# Patient Record
Sex: FEMALE | Race: WHITE | NOT HISPANIC OR LATINO | Employment: OTHER | URBAN - METROPOLITAN AREA
[De-identification: names, ages, dates, MRNs, and addresses within clinical notes are randomized per-mention and may not be internally consistent; named-entity substitution may affect disease eponyms.]

---

## 2017-01-03 ENCOUNTER — TELEPHONE (OUTPATIENT)
Dept: FAMILY MEDICINE CLINIC | Facility: CLINIC | Age: 57
End: 2017-01-03

## 2017-01-13 ENCOUNTER — TELEPHONE (OUTPATIENT)
Dept: FAMILY MEDICINE CLINIC | Facility: CLINIC | Age: 57
End: 2017-01-13

## 2017-01-13 NOTE — TELEPHONE ENCOUNTER
----- Message from Krysta Gutierrez sent at 1/12/2017  4:25 PM EST -----  Regarding: BLOODWORK/ MEDS  Contact: [phone]  LDS: 12/14/16  NEXT APPT: 6/16/17    [facility]    SHE HAD BLOODWORK ON 12/14/16 TO MAKE SURE SHE CAN GET CELEBREX 2X DAILY. DR HORNE HAS CALLED HER BUT HE DOESN'T HAVE THE LAB REPORT.

## 2017-02-06 RX ORDER — ORPHENADRINE CITRATE 100 MG/1
TABLET, EXTENDED RELEASE ORAL
Qty: 60 TABLET | Refills: 1 | Status: SHIPPED | OUTPATIENT
Start: 2017-02-06 | End: 2017-06-16

## 2017-02-06 RX ORDER — MOMETASONE FUROATE AND FORMOTEROL FUMARATE DIHYDRATE 200; 5 UG/1; UG/1
AEROSOL RESPIRATORY (INHALATION)
Qty: 13 G | Refills: 3 | Status: SHIPPED | OUTPATIENT
Start: 2017-02-06 | End: 2017-10-19 | Stop reason: SDUPTHER

## 2017-03-04 RX ORDER — LEVOFLOXACIN 500 MG/1
500 TABLET, FILM COATED ORAL DAILY
Qty: 10 TABLET | Refills: 0 | Status: SHIPPED | OUTPATIENT
Start: 2017-03-04 | End: 2017-03-07 | Stop reason: SDUPTHER

## 2017-03-07 RX ORDER — LEVOFLOXACIN 500 MG/1
500 TABLET, FILM COATED ORAL DAILY
Qty: 10 TABLET | Refills: 0 | Status: SHIPPED | OUTPATIENT
Start: 2017-03-07 | End: 2017-03-17

## 2017-03-18 ENCOUNTER — OFFICE VISIT (OUTPATIENT)
Dept: FAMILY MEDICINE CLINIC | Facility: CLINIC | Age: 57
End: 2017-03-18

## 2017-03-18 VITALS
DIASTOLIC BLOOD PRESSURE: 96 MMHG | SYSTOLIC BLOOD PRESSURE: 146 MMHG | BODY MASS INDEX: 35.51 KG/M2 | HEIGHT: 64 IN | WEIGHT: 208 LBS | TEMPERATURE: 97.9 F

## 2017-03-18 DIAGNOSIS — M54.42 ACUTE BILATERAL LOW BACK PAIN WITH LEFT-SIDED SCIATICA: ICD-10-CM

## 2017-03-18 DIAGNOSIS — G93.31 POST-INFLUENZA SYNDROME: Primary | ICD-10-CM

## 2017-03-18 DIAGNOSIS — G44.209 ACUTE NON INTRACTABLE TENSION-TYPE HEADACHE: ICD-10-CM

## 2017-03-18 DIAGNOSIS — J32.4 PANSINUSITIS, UNSPECIFIED CHRONICITY: ICD-10-CM

## 2017-03-18 DIAGNOSIS — I15.9 SECONDARY HYPERTENSION: ICD-10-CM

## 2017-03-18 DIAGNOSIS — J45.901 REACTIVE AIRWAY DISEASE WITH ACUTE EXACERBATION: ICD-10-CM

## 2017-03-18 DIAGNOSIS — J40 BRONCHITIS: ICD-10-CM

## 2017-03-18 PROBLEM — M54.50 ACUTE LOW BACK PAIN: Status: ACTIVE | Noted: 2017-03-18

## 2017-03-18 PROCEDURE — 96372 THER/PROPH/DIAG INJ SC/IM: CPT | Performed by: INTERNAL MEDICINE

## 2017-03-18 PROCEDURE — 99214 OFFICE O/P EST MOD 30 MIN: CPT | Performed by: INTERNAL MEDICINE

## 2017-03-18 RX ORDER — BENZONATATE 100 MG/1
100 CAPSULE ORAL 3 TIMES DAILY PRN
Qty: 40 CAPSULE | Refills: 1 | Status: SHIPPED | OUTPATIENT
Start: 2017-03-18 | End: 2017-06-16

## 2017-03-18 RX ORDER — ORPHENADRINE CITRATE 100 MG/1
100 TABLET, EXTENDED RELEASE ORAL
COMMUNITY
End: 2017-03-18

## 2017-03-18 RX ORDER — METHYLPREDNISOLONE ACETATE 80 MG/ML
80 INJECTION, SUSPENSION INTRA-ARTICULAR; INTRALESIONAL; INTRAMUSCULAR; SOFT TISSUE ONCE
Status: COMPLETED | OUTPATIENT
Start: 2017-03-18 | End: 2017-03-18

## 2017-03-18 RX ORDER — TRAMADOL HYDROCHLORIDE 50 MG/1
50 TABLET ORAL EVERY 6 HOURS PRN
Qty: 60 TABLET | Refills: 0 | Status: SHIPPED | OUTPATIENT
Start: 2017-03-18 | End: 2017-06-16

## 2017-03-18 RX ORDER — CLARITHROMYCIN 500 MG/1
500 TABLET, COATED ORAL 2 TIMES DAILY
Qty: 28 TABLET | Refills: 0 | Status: SHIPPED | OUTPATIENT
Start: 2017-03-18 | End: 2017-04-01

## 2017-03-18 RX ORDER — FLUTICASONE PROPIONATE 50 MCG
1 SPRAY, SUSPENSION (ML) NASAL
COMMUNITY
End: 2017-06-16

## 2017-03-18 RX ORDER — DEXTROMETHORPHAN HYDROBROMIDE AND PROMETHAZINE HYDROCHLORIDE 15; 6.25 MG/5ML; MG/5ML
5 SYRUP ORAL 4 TIMES DAILY PRN
Qty: 240 ML | Refills: 0 | Status: SHIPPED | OUTPATIENT
Start: 2017-03-18 | End: 2017-06-16

## 2017-03-18 RX ORDER — BENZONATATE 100 MG/1
CAPSULE ORAL
Refills: 0 | COMMUNITY
Start: 2017-01-17 | End: 2017-03-18

## 2017-03-18 RX ORDER — MONTELUKAST SODIUM 10 MG/1
10 TABLET ORAL
COMMUNITY
End: 2017-03-18

## 2017-03-18 RX ADMIN — METHYLPREDNISOLONE ACETATE 80 MG: 80 INJECTION, SUSPENSION INTRA-ARTICULAR; INTRALESIONAL; INTRAMUSCULAR; SOFT TISSUE at 13:20

## 2017-03-18 NOTE — PATIENT INSTRUCTIONS
Diagnoses and all orders for this visit:    Post-influenza syndrome  Comments:  Flu swab positive  Flare of underlying RAD  Orders:  -     methylPREDNISolone acetate (DEPO-medrol) injection 80 mg; Inject 1 mL into the shoulder, thigh, or buttocks 1 (One) Time.    Reactive airway disease with acute exacerbation  Comments:  Use all inhalers regularly  DepoMedrol shot 60 mg    Orders:  -     methylPREDNISolone acetate (DEPO-medrol) injection 80 mg; Inject 1 mL into the shoulder, thigh, or buttocks 1 (One) Time.    Pansinusitis, unspecified chronicity  Comments:  Biaxin 500 bid x 14 days  Now stable at present  Orders:  -     methylPREDNISolone acetate (DEPO-medrol) injection 80 mg; Inject 1 mL into the shoulder, thigh, or buttocks 1 (One) Time.    Bronchitis  Comments:  Biaxin 500 bid  Tessalon Pearles and Promethazine DM  Orders:  -     methylPREDNISolone acetate (DEPO-medrol) injection 80 mg; Inject 1 mL into the shoulder, thigh, or buttocks 1 (One) Time.    Acute non intractable tension-type headache  Comments:  Stable at present  Orders:  -     methylPREDNISolone acetate (DEPO-medrol) injection 80 mg; Inject 1 mL into the shoulder, thigh, or buttocks 1 (One) Time.    Secondary hypertension  Comments:  Avoid salt  Try to lose weight  Follow up if persistent  Orders:  -     methylPREDNISolone acetate (DEPO-medrol) injection 80 mg; Inject 1 mL into the shoulder, thigh, or buttocks 1 (One) Time.    Acute bilateral low back pain with left-sided sciatica  Comments:  MARCIA checked  Ultram RX bshort term  Follow up with Dr. Drake    Other orders  -     fluticasone (FLONASE) 50 MCG/ACT nasal spray; 1 spray into each nostril.  -     Discontinue: mometasone-formoterol (DULERA) 200-5 MCG/ACT inhaler; Inhale.  -     Discontinue: montelukast (SINGULAIR) 10 MG tablet; Take 10 mg by mouth.  -     Discontinue: orphenadrine (NORFLEX) 100 MG 12 hr tablet; Take 100 mg by mouth.  -     Discontinue: benzonatate (TESSALON) 100 MG  capsule; TAKE 1 TO 2 CAPSULES BY MOUTH 3 TIMES A DAY AS NEEDED  -     promethazine-dextromethorphan (PROMETHAZINE-DM) 6.25-15 MG/5ML syrup; Take 5 mL by mouth 4 (Four) Times a Day As Needed for Cough.  -     clarithromycin (BIAXIN) 500 MG tablet; Take 1 tablet by mouth 2 (Two) Times a Day for 14 days.  -     benzonatate (TESSALON PERLES) 100 MG capsule; Take 1 capsule by mouth 3 (Three) Times a Day As Needed for Cough.  -     traMADol (ULTRAM) 50 MG tablet; Take 1 tablet by mouth Every 6 (Six) Hours As Needed for Moderate Pain (4-6).

## 2017-03-18 NOTE — PROGRESS NOTES
Flaquita Cr is a 56 y.o. female   Chief Complaint   Patient presents with   • URI     pt c/o not better after taking Levaquin, c/o sinus pressure, headaches, chest congestion, nasal drainage, persistent cough,  thick yellow phlegm x 2wks     Per House Bill #1 Requirements and [place] Board of Medical Licensure Regulations for prescribing of Schedule II and Schedule III with Hydrocodone, and other controlled medications for which the Board requires MARCIA reporting and regulation, the following drug treatment plan was developed and reviewed with the patient on the date of this encounter:    Controlled medication(s) taken: Ultram      Medical Indication (including pain relief and/or other physical and psychoosocial functional issue) for treatment: Back pain    Further Diagnostic tests, consultations, or treatments needed: follow up with Dr. Drake next week    Plans for review of plan, adjustment and waning dose and further MARCIA evaluation include: MARCIA    Risk for medication abuse for this patient based on physician review is felt to be extremely low.    Updated: 03/18/17 by Dr. William Monteiro            Subjective   History of Present Illness     Flaquita Cr is a 56 y.o.female who presents with:follow after flu 6 weeks ago.  Treated with Tessalon Pearles.  Too late to use Tamiflu.  Nose swab positive for flu but negative for strep.  Had Levaquin 1 week ago by [facility] and no improved.  Has cough, sneezes, fever and re-exposure to pneumonia.  She is coughing up mucous at times that is deep yellow in color.  Suspect that she has pneumonia in 2 spows.   also has been on doxycycline in past.  She had reaction in the past.    Now feels bad at present time.Weight up 10#.  No energy to do anything at present.  Hard to walk around at a store.  Lungs SOA      The following portions of the patient's history were reviewed and updated as appropriate: past medical history, surgeries, family history,  "allergies, current medications, past social history and problem list.    A comprehensive review of 14 systems was peformed  Review of Systems   Constitutional: Negative for chills, fatigue, fever and unexpected weight change.   HENT: Positive for congestion, postnasal drip and sinus pressure. Negative for ear pain, hearing loss, sore throat and tinnitus.    Eyes: Negative for pain, discharge and redness.   Respiratory: Positive for cough. Negative for shortness of breath and wheezing.    Cardiovascular: Negative for chest pain, palpitations and leg swelling.   Gastrointestinal: Negative for abdominal pain, constipation, diarrhea and nausea.   Endocrine: Negative for cold intolerance and heat intolerance.   Genitourinary: Negative for difficulty urinating, flank pain and urgency.   Musculoskeletal: Negative for back pain, joint swelling and myalgias.   Skin: Negative for rash and wound.   Allergic/Immunologic: Negative for environmental allergies and food allergies.   Neurological: Positive for headaches. Negative for dizziness, seizures and numbness.   Hematological: Negative for adenopathy. Does not bruise/bleed easily.   Psychiatric/Behavioral: Negative for decreased concentration, dysphoric mood and sleep disturbance. The patient is not nervous/anxious.    All other systems reviewed and are negative.      I have reviewed the patient's medical history in detail and updated the computerized patient record.      Objective   Vitals:    03/18/17 1028   BP: 146/96   BP Location: Left arm   Patient Position: Sitting   Cuff Size: Adult   Temp: 97.9 °F (36.6 °C)   Weight: 208 lb (94.3 kg)   Height: 64\" (162.6 cm)         Physical Exam   Constitutional: She appears well-developed and well-nourished.   HENT:   Head: Normocephalic and atraumatic.   Right Ear: External ear normal.   Left Ear: External ear normal.   Nose: Nose normal.   Mouth/Throat: Oropharynx is clear and moist.   Sinus congestion and pressure pansinusitis "   Eyes: Conjunctivae and EOM are normal. Pupils are equal, round, and reactive to light.   Neck: Normal range of motion. Neck supple.   Cardiovascular: Normal rate, regular rhythm, normal heart sounds and intact distal pulses.    Pulmonary/Chest: Effort normal and breath sounds normal.   Wheezing mildly despite Singulair, Proair, and Dulera   Abdominal: Soft. Bowel sounds are normal.   Musculoskeletal: Normal range of motion.   Neurological: She is alert. She has normal reflexes.   Skin: Skin is warm and dry.   Psychiatric: She has a normal mood and affect. Her behavior is normal. Judgment and thought content normal.       Procedures                        Assessment/Plan     Diagnoses and all orders for this visit:    Post-influenza syndrome  Comments:  Flu swab positive  Flare of underlying RAD  Orders:  -     methylPREDNISolone acetate (DEPO-medrol) injection 80 mg; Inject 1 mL into the shoulder, thigh, or buttocks 1 (One) Time.    Reactive airway disease with acute exacerbation  Comments:  Use all inhalers regularly  DepoMedrol shot 60 mg    Orders:  -     methylPREDNISolone acetate (DEPO-medrol) injection 80 mg; Inject 1 mL into the shoulder, thigh, or buttocks 1 (One) Time.    Pansinusitis, unspecified chronicity  Comments:  Biaxin 500 bid x 14 days  Now stable at present  Orders:  -     methylPREDNISolone acetate (DEPO-medrol) injection 80 mg; Inject 1 mL into the shoulder, thigh, or buttocks 1 (One) Time.    Bronchitis  Comments:  Biaxin 500 bid  Tessalon Pearles and Promethazine DM  Orders:  -     methylPREDNISolone acetate (DEPO-medrol) injection 80 mg; Inject 1 mL into the shoulder, thigh, or buttocks 1 (One) Time.    Acute non intractable tension-type headache  Comments:  Stable at present  Orders:  -     methylPREDNISolone acetate (DEPO-medrol) injection 80 mg; Inject 1 mL into the shoulder, thigh, or buttocks 1 (One) Time.    Secondary hypertension  Comments:  Avoid salt  Try to lose weight  Follow up  if persistent  Orders:  -     methylPREDNISolone acetate (DEPO-medrol) injection 80 mg; Inject 1 mL into the shoulder, thigh, or buttocks 1 (One) Time.    Acute bilateral low back pain with left-sided sciatica  Comments:  MARCIA checked  Ultram RX bshort term  Follow up with Dr. Drake    Other orders  -     fluticasone (FLONASE) 50 MCG/ACT nasal spray; 1 spray into each nostril.  -     Discontinue: mometasone-formoterol (DULERA) 200-5 MCG/ACT inhaler; Inhale.  -     Discontinue: montelukast (SINGULAIR) 10 MG tablet; Take 10 mg by mouth.  -     Discontinue: orphenadrine (NORFLEX) 100 MG 12 hr tablet; Take 100 mg by mouth.  -     Discontinue: benzonatate (TESSALON) 100 MG capsule; TAKE 1 TO 2 CAPSULES BY MOUTH 3 TIMES A DAY AS NEEDED  -     promethazine-dextromethorphan (PROMETHAZINE-DM) 6.25-15 MG/5ML syrup; Take 5 mL by mouth 4 (Four) Times a Day As Needed for Cough.  -     clarithromycin (BIAXIN) 500 MG tablet; Take 1 tablet by mouth 2 (Two) Times a Day for 14 days.  -     benzonatate (TESSALON PERLES) 100 MG capsule; Take 1 capsule by mouth 3 (Three) Times a Day As Needed for Cough.  -     traMADol (ULTRAM) 50 MG tablet; Take 1 tablet by mouth Every 6 (Six) Hours As Needed for Moderate Pain (4-6).           William Monteiro MD  3/18/2017  10:34 AM

## 2017-03-20 RX ORDER — GUAIFENESIN/DEXTROMETHORPHAN 100-10MG/5
SYRUP ORAL
Qty: 240 ML | Refills: 0 | Status: SHIPPED | OUTPATIENT
Start: 2017-03-20 | End: 2017-06-16

## 2017-05-03 ENCOUNTER — TELEPHONE (OUTPATIENT)
Dept: FAMILY MEDICINE CLINIC | Facility: CLINIC | Age: 57
End: 2017-05-03

## 2017-06-16 ENCOUNTER — RESULTS ENCOUNTER (OUTPATIENT)
Dept: FAMILY MEDICINE CLINIC | Facility: CLINIC | Age: 57
End: 2017-06-16

## 2017-06-16 ENCOUNTER — OFFICE VISIT (OUTPATIENT)
Dept: FAMILY MEDICINE CLINIC | Facility: CLINIC | Age: 57
End: 2017-06-16

## 2017-06-16 VITALS
HEART RATE: 92 BPM | OXYGEN SATURATION: 94 % | BODY MASS INDEX: 35.1 KG/M2 | TEMPERATURE: 98 F | DIASTOLIC BLOOD PRESSURE: 70 MMHG | SYSTOLIC BLOOD PRESSURE: 106 MMHG | HEIGHT: 64 IN | WEIGHT: 205.6 LBS

## 2017-06-16 DIAGNOSIS — N32.81 OVERACTIVE BLADDER: ICD-10-CM

## 2017-06-16 DIAGNOSIS — R53.82 CHRONIC FATIGUE: ICD-10-CM

## 2017-06-16 DIAGNOSIS — Z13.9 SCREENING: ICD-10-CM

## 2017-06-16 DIAGNOSIS — N30.00 ACUTE CYSTITIS WITHOUT HEMATURIA: ICD-10-CM

## 2017-06-16 DIAGNOSIS — L71.9 ACNE ROSACEA: ICD-10-CM

## 2017-06-16 DIAGNOSIS — R10.32 LEFT LOWER QUADRANT PAIN: ICD-10-CM

## 2017-06-16 DIAGNOSIS — Z13.9 SCREENING: Primary | ICD-10-CM

## 2017-06-16 LAB
BILIRUB BLD-MCNC: NEGATIVE MG/DL
CLARITY, POC: CLEAR
COLOR UR: YELLOW
GLUCOSE UR STRIP-MCNC: NEGATIVE MG/DL
KETONES UR QL: NEGATIVE
LEUKOCYTE EST, POC: NEGATIVE
NITRITE UR-MCNC: NEGATIVE MG/ML
PH UR: 6 [PH] (ref 5–8)
PROT UR STRIP-MCNC: NEGATIVE MG/DL
RBC # UR STRIP: NEGATIVE /UL
SP GR UR: 1.03 (ref 1–1.03)
UROBILINOGEN UR QL: NORMAL

## 2017-06-16 PROCEDURE — 81003 URINALYSIS AUTO W/O SCOPE: CPT | Performed by: FAMILY MEDICINE

## 2017-06-16 PROCEDURE — 99213 OFFICE O/P EST LOW 20 MIN: CPT | Performed by: FAMILY MEDICINE

## 2017-06-16 RX ORDER — CELECOXIB 200 MG/1
200 CAPSULE ORAL 2 TIMES DAILY
Qty: 60 CAPSULE | Refills: 6 | Status: SHIPPED | OUTPATIENT
Start: 2017-06-16 | End: 2018-04-11 | Stop reason: SDUPTHER

## 2017-06-16 RX ORDER — DOXYCYCLINE HYCLATE 100 MG/1
100 TABLET, DELAYED RELEASE ORAL DAILY
Qty: 30 TABLET | Refills: 6 | Status: SHIPPED | OUTPATIENT
Start: 2017-06-16 | End: 2017-07-27

## 2017-06-16 NOTE — PROGRESS NOTES
Chief Complaint and HPI    I have reviewed the patient's medical history in detail and updated the computerized patient record.    Subjective: Flaquita Cr is a 57 y.o. female presents   here today for  .    Hypertension (follow up feels like may have a UTI ); Osteoarthritis (on celebrex); Fatigue; and Urinary Tract Infection (urgency)      Review of Systems   Constitutional: Negative for chills, fatigue, fever and unexpected weight change.   HENT: Negative for ear pain, hearing loss, sinus pressure, sore throat and tinnitus.    Eyes: Negative for pain, discharge and redness.   Respiratory: Negative for cough, shortness of breath and wheezing.    Cardiovascular: Negative for chest pain, palpitations and leg swelling.   Gastrointestinal: Negative for abdominal pain, constipation, diarrhea and nausea.   Endocrine: Negative for cold intolerance and heat intolerance.   Genitourinary: Negative for difficulty urinating, flank pain and urgency.   Musculoskeletal: Negative for back pain, joint swelling and myalgias.   Skin: Negative for rash and wound.   Allergic/Immunologic: Negative for environmental allergies and food allergies.   Neurological: Negative for dizziness, seizures, numbness and headaches.   Hematological: Negative for adenopathy. Does not bruise/bleed easily.   Psychiatric/Behavioral: Negative for decreased concentration, dysphoric mood and sleep disturbance. The patient is not nervous/anxious.    All other systems reviewed and are negative.        Physical Exam   Constitutional: She appears well-developed and well-nourished.   Cardiovascular: Normal rate, regular rhythm and intact distal pulses.    Pulmonary/Chest: Effort normal and breath sounds normal.   Abdominal:   L CVA tenderness   Vitals reviewed.      Procedures    Assessment:   Diagnosis Plan   1. Screening  POC Urinalysis Dipstick, Automated    Urine culture (clean catch)    CBC & Differential    Comprehensive Metabolic Panel    TSH     CT Abdomen Pelvis With Contrast   2. Acute cystitis without hematuria  Urine culture (clean catch)    CBC & Differential    Comprehensive Metabolic Panel    TSH    CT Abdomen Pelvis With Contrast   3. Acne rosacea  Urine culture (clean catch)    CBC & Differential    Comprehensive Metabolic Panel    TSH    CT Abdomen Pelvis With Contrast   4. Left lower quadrant pain  Urine culture (clean catch)    CBC & Differential    Comprehensive Metabolic Panel    TSH    CT Abdomen Pelvis With Contrast   5. Chronic fatigue  Urine culture (clean catch)    CBC & Differential    Comprehensive Metabolic Panel    TSH    CT Abdomen Pelvis With Contrast   6. Overactive bladder  CT Abdomen Pelvis With Contrast       Plan:  Orders Placed This Encounter   Procedures   • Urine culture (clean catch)     Standing Status:   Future     Standing Expiration Date:   6/16/2018   • CT Abdomen Pelvis With Contrast     Standing Status:   Future     Standing Expiration Date:   6/16/2018     Scheduling Instructions:      Done on Monday     Order Specific Question:   Reason for Exam:     Answer:   L lower abdominal pain   • Comprehensive Metabolic Panel   • TSH   • POC Urinalysis Dipstick, Automated   • CBC & Differential     Order Specific Question:   Manual Differential     Answer:   No       Requested Prescriptions     Signed Prescriptions Disp Refills   • celecoxib (CELEBREX) 200 MG capsule 60 capsule 6     Sig: Take 1 capsule by mouth 2 (Two) Times a Day.   • doxycycline (DORYX) 100 MG enteric coated tablet 30 tablet 6     Sig: Take 1 tablet by mouth Daily.       All tests and consults since last visit reviewed with patient    No Follow-up on file.

## 2017-06-19 ENCOUNTER — APPOINTMENT (OUTPATIENT)
Dept: LAB | Facility: HOSPITAL | Age: 57
End: 2017-06-19

## 2017-06-19 LAB
ALBUMIN SERPL-MCNC: 4.2 G/DL (ref 3.5–5.2)
ALBUMIN/GLOB SERPL: 1.2 G/DL
ALP SERPL-CCNC: 81 U/L (ref 39–117)
ALT SERPL W P-5'-P-CCNC: 48 U/L (ref 1–33)
ANION GAP SERPL CALCULATED.3IONS-SCNC: 12.7 MMOL/L
AST SERPL-CCNC: 39 U/L (ref 1–32)
BASOPHILS # BLD AUTO: 0.02 10*3/MM3 (ref 0–0.2)
BASOPHILS NFR BLD AUTO: 0.3 % (ref 0–1.5)
BILIRUB SERPL-MCNC: 0.2 MG/DL (ref 0.1–1.2)
BUN BLD-MCNC: 14 MG/DL (ref 6–20)
BUN/CREAT SERPL: 13.3 (ref 7–25)
CALCIUM SPEC-SCNC: 9.6 MG/DL (ref 8.6–10.5)
CHLORIDE SERPL-SCNC: 100 MMOL/L (ref 98–107)
CO2 SERPL-SCNC: 28.3 MMOL/L (ref 22–29)
CREAT BLD-MCNC: 1.05 MG/DL (ref 0.57–1)
DEPRECATED RDW RBC AUTO: 47.9 FL (ref 37–54)
EOSINOPHIL # BLD AUTO: 0.1 10*3/MM3 (ref 0–0.7)
EOSINOPHIL NFR BLD AUTO: 1.3 % (ref 0.3–6.2)
ERYTHROCYTE [DISTWIDTH] IN BLOOD BY AUTOMATED COUNT: 14.7 % (ref 11.7–13)
GFR SERPL CREATININE-BSD FRML MDRD: 54 ML/MIN/1.73
GLOBULIN UR ELPH-MCNC: 3.5 GM/DL
GLUCOSE BLD-MCNC: 102 MG/DL (ref 65–99)
HCT VFR BLD AUTO: 40.7 % (ref 35.6–45.5)
HGB BLD-MCNC: 13.6 G/DL (ref 11.9–15.5)
IMM GRANULOCYTES # BLD: 0.03 10*3/MM3 (ref 0–0.03)
IMM GRANULOCYTES NFR BLD: 0.4 % (ref 0–0.5)
LYMPHOCYTES # BLD AUTO: 2.52 10*3/MM3 (ref 0.9–4.8)
LYMPHOCYTES NFR BLD AUTO: 33.2 % (ref 19.6–45.3)
MCH RBC QN AUTO: 30 PG (ref 26.9–32)
MCHC RBC AUTO-ENTMCNC: 33.4 G/DL (ref 32.4–36.3)
MCV RBC AUTO: 89.8 FL (ref 80.5–98.2)
MONOCYTES # BLD AUTO: 0.47 10*3/MM3 (ref 0.2–1.2)
MONOCYTES NFR BLD AUTO: 6.2 % (ref 5–12)
NEUTROPHILS # BLD AUTO: 4.46 10*3/MM3 (ref 1.9–8.1)
NEUTROPHILS NFR BLD AUTO: 58.6 % (ref 42.7–76)
PLATELET # BLD AUTO: 314 10*3/MM3 (ref 140–500)
PMV BLD AUTO: 10 FL (ref 6–12)
POTASSIUM BLD-SCNC: 3.6 MMOL/L (ref 3.5–5.2)
PROT SERPL-MCNC: 7.7 G/DL (ref 6–8.5)
RBC # BLD AUTO: 4.53 10*6/MM3 (ref 3.9–5.2)
SODIUM BLD-SCNC: 141 MMOL/L (ref 136–145)
TSH SERPL DL<=0.05 MIU/L-ACNC: 3.71 MIU/ML (ref 0.27–4.2)
WBC NRBC COR # BLD: 7.6 10*3/MM3 (ref 4.5–10.7)

## 2017-06-19 PROCEDURE — 87086 URINE CULTURE/COLONY COUNT: CPT | Performed by: FAMILY MEDICINE

## 2017-06-19 PROCEDURE — 36415 COLL VENOUS BLD VENIPUNCTURE: CPT | Performed by: FAMILY MEDICINE

## 2017-06-19 PROCEDURE — 84443 ASSAY THYROID STIM HORMONE: CPT | Performed by: FAMILY MEDICINE

## 2017-06-19 PROCEDURE — 85025 COMPLETE CBC W/AUTO DIFF WBC: CPT | Performed by: FAMILY MEDICINE

## 2017-06-19 PROCEDURE — 80053 COMPREHEN METABOLIC PANEL: CPT | Performed by: FAMILY MEDICINE

## 2017-06-21 ENCOUNTER — HOSPITAL ENCOUNTER (OUTPATIENT)
Dept: CT IMAGING | Facility: HOSPITAL | Age: 57
Discharge: HOME | End: 2017-06-21
Attending: FAMILY MEDICINE | Admitting: FAMILY MEDICINE

## 2017-06-21 DIAGNOSIS — R53.82 CHRONIC FATIGUE: ICD-10-CM

## 2017-06-21 DIAGNOSIS — L71.9 ACNE ROSACEA: ICD-10-CM

## 2017-06-21 DIAGNOSIS — N32.81 OVERACTIVE BLADDER: ICD-10-CM

## 2017-06-21 DIAGNOSIS — Z13.9 SCREENING: ICD-10-CM

## 2017-06-21 DIAGNOSIS — R10.32 LEFT LOWER QUADRANT PAIN: ICD-10-CM

## 2017-06-21 DIAGNOSIS — N30.00 ACUTE CYSTITIS WITHOUT HEMATURIA: ICD-10-CM

## 2017-06-21 LAB
BACTERIA SPEC AEROBE CULT: NORMAL
BACTERIA UR CULT: NO GROWTH
BACTERIA UR CULT: NORMAL

## 2017-06-21 PROCEDURE — 0 DIATRIZOATE MEGLUMINE & SODIUM PER 1 ML: Performed by: FAMILY MEDICINE

## 2017-06-21 PROCEDURE — 0 IOPAMIDOL 61 % SOLUTION: Performed by: FAMILY MEDICINE

## 2017-06-21 PROCEDURE — 74177 CT ABD & PELVIS W/CONTRAST: CPT

## 2017-06-21 PROCEDURE — 82565 ASSAY OF CREATININE: CPT

## 2017-06-21 RX ADMIN — DIATRIZOATE MEGLUMINE AND DIATRIZOATE SODIUM 30 ML: 660; 100 LIQUID ORAL; RECTAL at 13:30

## 2017-06-21 RX ADMIN — IOPAMIDOL 85 ML: 612 INJECTION, SOLUTION INTRAVENOUS at 15:30

## 2017-06-26 ENCOUNTER — APPOINTMENT (OUTPATIENT)
Dept: CT IMAGING | Facility: HOSPITAL | Age: 57
End: 2017-06-26
Attending: FAMILY MEDICINE

## 2017-06-26 LAB — CREAT BLDA-MCNC: 1.1 MG/DL (ref 0.6–1.3)

## 2017-07-14 ENCOUNTER — TELEPHONE (OUTPATIENT)
Dept: FAMILY MEDICINE CLINIC | Facility: CLINIC | Age: 57
End: 2017-07-14

## 2017-07-14 NOTE — TELEPHONE ENCOUNTER
----- Message from Nicole Young sent at 7/14/2017 10:25 AM EDT -----  [phone]      PT WOULD LIKE BLOOD WORK RESULTS, AND ALSO CT SCAN RESULTS.       PT IS AWARE TO ALLOW 24 TO 48 HOURS FOR CALL BACK.   THANK YOU     Last office visit 6/16/17

## 2017-07-20 ENCOUNTER — TELEPHONE (OUTPATIENT)
Dept: GASTROENTEROLOGY | Facility: CLINIC | Age: 57
End: 2017-07-20

## 2017-07-20 DIAGNOSIS — R68.83 CHILLS: ICD-10-CM

## 2017-07-20 DIAGNOSIS — R19.7 DIARRHEA, UNSPECIFIED TYPE: ICD-10-CM

## 2017-07-20 DIAGNOSIS — R10.9 ABDOMINAL PAIN, UNSPECIFIED LOCATION: Primary | ICD-10-CM

## 2017-07-20 DIAGNOSIS — R50.9 FEVER, UNSPECIFIED FEVER CAUSE: ICD-10-CM

## 2017-07-20 NOTE — TELEPHONE ENCOUNTER
----- Message from Gopal Reddy sent at 7/20/2017  3:08 PM EDT -----  Regarding: PT CALLED  Contact: [phone]  THIS PT IS A NEW PT WITH  & HER APPT IS ON July 27TH & IS FEELING HORRIBLE & DOESN'T KNOW WHAT SHE SHOULD DO? SHES HAVING CHILLS,FEVER,NAUSEA & PAIN. IS THEIR ANYTHING SHE CAN TAKE ?? PT WOULD LIKE A CALL BACK.THANKS

## 2017-07-20 NOTE — TELEPHONE ENCOUNTER
"Per Dr Mims: \"CBC, CMP, amylase and lipase\" (Routing comment)     Called pt and advised that Dr Mims recommends to have some some labwork drawn. Advised can make an appt for her to come into the office tomorrow at 8 AM or she can have them drawn anytime tomorrow through the out patients lab in the hospital. Pt verb understanding and state she will go to the outpatient lab in the hospital. Lab orders placed.   "

## 2017-07-20 NOTE — TELEPHONE ENCOUNTER
Called pt back. Pt states she was last seen by Dr Mims 4 years ago. Pt states she has been seeing her PMD for abdominal/back/flank pain and she had a CT scan done 6/21/17 for that. Pt states she never heard back from her PMD about those results so she looked them up on Mychart and saw that she has diverticulitis. Advised that scan shows diverticulosis, but no diverticulitis. Pt states regardless, she never heard back from her PMD and she continues to have abdominal pain and started having other GI symptoms today so she decided to call Dr Mims. Pt states she has had abdominal pain for years but today, she started having fever and chills, nausea and diarrhea. Advised will send a message to Dr Mims to see what he can recommend for her before her appt scheduled for 7/27/17. Pt erb understanding.

## 2017-07-21 ENCOUNTER — APPOINTMENT (OUTPATIENT)
Dept: LAB | Facility: HOSPITAL | Age: 57
End: 2017-07-21

## 2017-07-21 LAB
ALBUMIN SERPL-MCNC: 3.8 G/DL (ref 3.5–5.2)
ALBUMIN/GLOB SERPL: 1 G/DL
ALP SERPL-CCNC: 82 U/L (ref 39–117)
ALT SERPL W P-5'-P-CCNC: 41 U/L (ref 1–33)
AMYLASE SERPL-CCNC: 81 U/L (ref 28–100)
ANION GAP SERPL CALCULATED.3IONS-SCNC: 16 MMOL/L
AST SERPL-CCNC: 36 U/L (ref 1–32)
BILIRUB SERPL-MCNC: 0.2 MG/DL (ref 0.1–1.2)
BUN BLD-MCNC: 17 MG/DL (ref 6–20)
BUN/CREAT SERPL: 16.5 (ref 7–25)
CALCIUM SPEC-SCNC: 9.6 MG/DL (ref 8.6–10.5)
CHLORIDE SERPL-SCNC: 103 MMOL/L (ref 98–107)
CO2 SERPL-SCNC: 25 MMOL/L (ref 22–29)
CREAT BLD-MCNC: 1.03 MG/DL (ref 0.57–1)
DEPRECATED RDW RBC AUTO: 48.9 FL (ref 37–54)
ERYTHROCYTE [DISTWIDTH] IN BLOOD BY AUTOMATED COUNT: 14.6 % (ref 11.7–13)
GFR SERPL CREATININE-BSD FRML MDRD: 55 ML/MIN/1.73
GLOBULIN UR ELPH-MCNC: 4 GM/DL
GLUCOSE BLD-MCNC: 96 MG/DL (ref 65–99)
HCT VFR BLD AUTO: 42.6 % (ref 35.6–45.5)
HGB BLD-MCNC: 13.8 G/DL (ref 11.9–15.5)
LIPASE SERPL-CCNC: 28 U/L (ref 13–60)
MCH RBC QN AUTO: 29.6 PG (ref 26.9–32)
MCHC RBC AUTO-ENTMCNC: 32.4 G/DL (ref 32.4–36.3)
MCV RBC AUTO: 91.4 FL (ref 80.5–98.2)
PLATELET # BLD AUTO: 248 10*3/MM3 (ref 140–500)
PMV BLD AUTO: 10.3 FL (ref 6–12)
POTASSIUM BLD-SCNC: 4.3 MMOL/L (ref 3.5–5.2)
PROT SERPL-MCNC: 7.8 G/DL (ref 6–8.5)
RBC # BLD AUTO: 4.66 10*6/MM3 (ref 3.9–5.2)
SODIUM BLD-SCNC: 144 MMOL/L (ref 136–145)
WBC NRBC COR # BLD: 4.99 10*3/MM3 (ref 4.5–10.7)

## 2017-07-21 PROCEDURE — 85027 COMPLETE CBC AUTOMATED: CPT | Performed by: INTERNAL MEDICINE

## 2017-07-21 PROCEDURE — 83690 ASSAY OF LIPASE: CPT | Performed by: INTERNAL MEDICINE

## 2017-07-21 PROCEDURE — 82150 ASSAY OF AMYLASE: CPT | Performed by: INTERNAL MEDICINE

## 2017-07-21 PROCEDURE — 80053 COMPREHEN METABOLIC PANEL: CPT | Performed by: INTERNAL MEDICINE

## 2017-07-21 PROCEDURE — 36415 COLL VENOUS BLD VENIPUNCTURE: CPT

## 2017-07-27 ENCOUNTER — OFFICE VISIT (OUTPATIENT)
Dept: GASTROENTEROLOGY | Facility: CLINIC | Age: 57
End: 2017-07-27

## 2017-07-27 VITALS
DIASTOLIC BLOOD PRESSURE: 92 MMHG | SYSTOLIC BLOOD PRESSURE: 140 MMHG | WEIGHT: 207 LBS | HEIGHT: 64 IN | BODY MASS INDEX: 35.34 KG/M2

## 2017-07-27 DIAGNOSIS — K57.30 DIVERTICULOSIS OF LARGE INTESTINE WITHOUT HEMORRHAGE: ICD-10-CM

## 2017-07-27 DIAGNOSIS — R19.7 DIARRHEA, UNSPECIFIED TYPE: ICD-10-CM

## 2017-07-27 DIAGNOSIS — R10.9 ABDOMINAL PAIN, UNSPECIFIED LOCATION: Primary | ICD-10-CM

## 2017-07-27 PROCEDURE — 99203 OFFICE O/P NEW LOW 30 MIN: CPT | Performed by: INTERNAL MEDICINE

## 2017-07-27 RX ORDER — MONTELUKAST SODIUM 10 MG/1
10 TABLET ORAL
COMMUNITY
End: 2018-04-11 | Stop reason: SDUPTHER

## 2017-07-27 RX ORDER — AMOXICILLIN 500 MG/1
1000 CAPSULE ORAL 2 TIMES DAILY
Status: ON HOLD | COMMUNITY
End: 2017-08-24

## 2017-07-27 RX ORDER — FLUTICASONE PROPIONATE 50 MCG
1 SPRAY, SUSPENSION (ML) NASAL
COMMUNITY
End: 2017-12-27

## 2017-07-27 RX ORDER — SODIUM CHLORIDE 0.9 % (FLUSH) 0.9 %
1-10 SYRINGE (ML) INJECTION AS NEEDED
Status: CANCELLED | OUTPATIENT
Start: 2017-08-24

## 2017-07-27 NOTE — PROGRESS NOTES
Chief Complaint   Patient presents with   • Abdominal Pain     back pain   • Diverticulosis     ct scan        Flaquita Cr is a 57 y.o. female who presents with Abdominal pain, diarrhea predominance    HPI Comments: colonoscopy 2012 showed colon polyps, she is due for colonoscopy at this time    Abdominal Pain   This is a chronic problem. The current episode started more than 1 year ago. The onset quality is gradual. The problem occurs daily. The problem has been waxing and waning. The pain is located in the LLQ and RLQ. The pain is at a severity of 5/10. The pain is moderate. The quality of the pain is aching, cramping, dull and a sensation of fullness. The abdominal pain radiates to the suprapubic region and pelvis. Associated symptoms include anorexia, belching, diarrhea, a fever, flatus, frequency, nausea and vomiting. Pertinent negatives include no weight loss. The pain is aggravated by eating. The pain is relieved by bowel movements. She has tried nothing for the symptoms. Prior diagnostic workup includes CT scan. Her past medical history is significant for GERD, irritable bowel syndrome and PUD. There is no history of colon cancer, Crohn's disease, pancreatitis or ulcerative colitis.       Past Medical History:   Diagnosis Date   • Allergy     Allergies   • Arthritis     failed naproxena dn meloxicam - celebrex works well   • Circulation problem    • Depression    • Hyperlipidemia    • Right leg pain        Past Surgical History:   Procedure Laterality Date   • ROOT CANAL      R upper jaw with rootcanal         Current Outpatient Prescriptions:   •  amoxicillin (AMOXIL) 500 MG capsule, Take 1,000 mg by mouth 2 (Two) Times a Day., Disp: , Rfl:   •  celecoxib (CELEBREX) 200 MG capsule, Take 1 capsule by mouth 2 (Two) Times a Day., Disp: 60 capsule, Rfl: 6  •  DULERA 200-5 MCG/ACT inhaler, INHALE 2PUFFS 2 TIMES A DAY, Disp: 13 g, Rfl: 3  •  fluticasone (FLONASE) 50 MCG/ACT nasal spray, 1 spray into each  nostril., Disp: , Rfl:   •  Mirabegron (MYRBETRIQ PO), Take  by mouth., Disp: , Rfl:   •  montelukast (SINGULAIR) 10 MG tablet, Take 10 mg by mouth., Disp: , Rfl:   •  omeprazole (PriLOSEC) 40 MG capsule, TAKE 1 CAPSULE BY MOUTH DAILY, Disp: 90 capsule, Rfl: 3  •  aspirin 81 MG tablet, Take 81 mg by mouth Daily., Disp: , Rfl:     Allergies   Allergen Reactions   • Doxycycline    • Lortab [Hydrocodone-Acetaminophen]        Social History     Social History   • Marital status:      Spouse name: N/A   • Number of children: N/A   • Years of education: N/A     Occupational History   • Not on file.     Social History Main Topics   • Smoking status: Former Smoker   • Smokeless tobacco: Not on file   • Alcohol use Not on file   • Drug use: Not on file   • Sexual activity: Not on file     Other Topics Concern   • Not on file     Social History Narrative       History reviewed. No pertinent family history.    Review of Systems   Constitutional: Positive for fever. Negative for weight loss.   Gastrointestinal: Positive for abdominal pain, anorexia, diarrhea, flatus, nausea and vomiting.   Genitourinary: Positive for frequency.   All other systems reviewed and are negative.      Vitals:    07/27/17 1042   BP: 140/92       Physical Exam   Constitutional: She is oriented to person, place, and time. She appears well-developed and well-nourished.   HENT:   Head: Normocephalic and atraumatic.   Eyes: Pupils are equal, round, and reactive to light.   Cardiovascular: Normal rate, regular rhythm and normal heart sounds.    Pulmonary/Chest: Effort normal and breath sounds normal.   Abdominal: Soft. Bowel sounds are normal. She exhibits no shifting dullness, no distension, no pulsatile liver, no fluid wave, no abdominal bruit, no ascites, no pulsatile midline mass and no mass. There is no hepatosplenomegaly. There is no tenderness. There is no rigidity and no guarding. No hernia.   Musculoskeletal: Normal range of motion.    Neurological: She is alert and oriented to person, place, and time.   Skin: Skin is warm and dry.   Psychiatric: She has a normal mood and affect. Her behavior is normal. Thought content normal.   Nursing note and vitals reviewed.      Problem list    Tubular adenoma  Peptic ulcer disease  Abdominal pain  Diarrhea predominance      Assessment/Plan    She is due for colonoscopy at this time, I believe her diagnosis is diarrhea predominant irritable bowel syndrome.  We will plan for colonoscopy and if colonoscopy with biopsies are negative for microscopic colitis I would plan on treating her with Xifaxan and anti-spasmodic

## 2017-08-04 ENCOUNTER — TELEPHONE (OUTPATIENT)
Dept: GASTROENTEROLOGY | Facility: CLINIC | Age: 57
End: 2017-08-04

## 2017-08-04 NOTE — TELEPHONE ENCOUNTER
Called pt and advised that per Dr Mims: her labs look OK and they are the same as they were one month ago. Pt verb understanding.

## 2017-08-04 NOTE — TELEPHONE ENCOUNTER
----- Message from Hugo Mims MD sent at 7/25/2017 12:42 PM EDT -----  Lab look okay.  Same as 1 month ago

## 2017-08-22 ENCOUNTER — TELEPHONE (OUTPATIENT)
Dept: FAMILY MEDICINE CLINIC | Facility: CLINIC | Age: 57
End: 2017-08-22

## 2017-08-22 NOTE — TELEPHONE ENCOUNTER
----- Message from Krysta Gutierrez sent at 8/22/2017  3:50 PM EDT -----  Regarding: Rx  Contact: [phone]  PATIENTS HEALTH INSURANCE WILL NOT PAY FOR   Mirabegron ER (MYRBETRIQ) 50 MG tablet sustained-release 24 hour 24 hr tablet. THEY TOLD HER SHE MUST TRY 2 GENERIC DRUGS BEFORE INSURANCE WILL PAY.  CAN DR HORNE CALL SOMETHING ELSE INTO THE PHARMACY?    [facility]/pharmacy #[id number] - [place], [place] - [address] - [phone] [facility] [phone] FX    THANK YOU    Last office visit 6/16/17

## 2017-08-23 RX ORDER — TOLTERODINE TARTRATE 2 MG/1
2 TABLET, EXTENDED RELEASE ORAL 2 TIMES DAILY
Qty: 60 TABLET | Refills: 5 | Status: SHIPPED | OUTPATIENT
Start: 2017-08-23 | End: 2017-12-27

## 2017-08-24 ENCOUNTER — HOSPITAL ENCOUNTER (OUTPATIENT)
Facility: HOSPITAL | Age: 57
Setting detail: HOSPITAL OUTPATIENT SURGERY
Discharge: HOME | End: 2017-08-24
Attending: INTERNAL MEDICINE | Admitting: INTERNAL MEDICINE

## 2017-08-24 ENCOUNTER — ANESTHESIA EVENT (OUTPATIENT)
Dept: GASTROENTEROLOGY | Facility: HOSPITAL | Age: 57
End: 2017-08-24

## 2017-08-24 ENCOUNTER — ANESTHESIA (OUTPATIENT)
Dept: GASTROENTEROLOGY | Facility: HOSPITAL | Age: 57
End: 2017-08-24

## 2017-08-24 VITALS
WEIGHT: 202 LBS | HEIGHT: 64 IN | OXYGEN SATURATION: 94 % | HEART RATE: 75 BPM | RESPIRATION RATE: 16 BRPM | TEMPERATURE: 97.4 F | DIASTOLIC BLOOD PRESSURE: 82 MMHG | SYSTOLIC BLOOD PRESSURE: 134 MMHG | BODY MASS INDEX: 34.49 KG/M2

## 2017-08-24 DIAGNOSIS — R19.7 DIARRHEA, UNSPECIFIED TYPE: ICD-10-CM

## 2017-08-24 DIAGNOSIS — R10.9 ABDOMINAL PAIN, UNSPECIFIED LOCATION: ICD-10-CM

## 2017-08-24 DIAGNOSIS — K57.30 DIVERTICULOSIS OF LARGE INTESTINE WITHOUT HEMORRHAGE: ICD-10-CM

## 2017-08-24 PROCEDURE — 88305 TISSUE EXAM BY PATHOLOGIST: CPT | Performed by: INTERNAL MEDICINE

## 2017-08-24 PROCEDURE — 45380 COLONOSCOPY AND BIOPSY: CPT | Performed by: INTERNAL MEDICINE

## 2017-08-24 PROCEDURE — 25010000002 PROPOFOL 10 MG/ML EMULSION: Performed by: NURSE ANESTHETIST, CERTIFIED REGISTERED

## 2017-08-24 RX ORDER — SODIUM CHLORIDE 0.9 % (FLUSH) 0.9 %
1-10 SYRINGE (ML) INJECTION AS NEEDED
Status: DISCONTINUED | OUTPATIENT
Start: 2017-08-24 | End: 2017-08-24 | Stop reason: HOSPADM

## 2017-08-24 RX ORDER — SODIUM CHLORIDE, SODIUM LACTATE, POTASSIUM CHLORIDE, CALCIUM CHLORIDE 600; 310; 30; 20 MG/100ML; MG/100ML; MG/100ML; MG/100ML
1000 INJECTION, SOLUTION INTRAVENOUS CONTINUOUS PRN
Status: DISCONTINUED | OUTPATIENT
Start: 2017-08-24 | End: 2017-08-24 | Stop reason: HOSPADM

## 2017-08-24 RX ORDER — PROPOFOL 10 MG/ML
VIAL (ML) INTRAVENOUS AS NEEDED
Status: DISCONTINUED | OUTPATIENT
Start: 2017-08-24 | End: 2017-08-24 | Stop reason: SURG

## 2017-08-24 RX ORDER — FLUOXETINE HYDROCHLORIDE 20 MG/1
20 CAPSULE ORAL DAILY
COMMUNITY
End: 2017-12-27

## 2017-08-24 RX ORDER — PROPOFOL 10 MG/ML
VIAL (ML) INTRAVENOUS CONTINUOUS PRN
Status: DISCONTINUED | OUTPATIENT
Start: 2017-08-24 | End: 2017-08-24 | Stop reason: SURG

## 2017-08-24 RX ORDER — LIDOCAINE HYDROCHLORIDE 20 MG/ML
INJECTION, SOLUTION INFILTRATION; PERINEURAL AS NEEDED
Status: DISCONTINUED | OUTPATIENT
Start: 2017-08-24 | End: 2017-08-24 | Stop reason: SURG

## 2017-08-24 RX ADMIN — PROPOFOL 150 MCG/KG/MIN: 10 INJECTION, EMULSION INTRAVENOUS at 10:53

## 2017-08-24 RX ADMIN — PROPOFOL 80 MG: 10 INJECTION, EMULSION INTRAVENOUS at 10:52

## 2017-08-24 RX ADMIN — SODIUM CHLORIDE, POTASSIUM CHLORIDE, SODIUM LACTATE AND CALCIUM CHLORIDE 1000 ML: 600; 310; 30; 20 INJECTION, SOLUTION INTRAVENOUS at 10:36

## 2017-08-24 RX ADMIN — LIDOCAINE HYDROCHLORIDE 60 MG: 20 INJECTION, SOLUTION INFILTRATION; PERINEURAL at 10:52

## 2017-08-24 NOTE — ANESTHESIA POSTPROCEDURE EVALUATION
Patient: Flaquita Cr    Procedure Summary     Date Anesthesia Start Anesthesia Stop Room / Location    08/24/17 1042 1127  [facility] /  [facility]       Procedure Diagnosis Surgeon Provider    COLONOSCOPY to cecum with random biopsies and polypectomies (N/A ) Diverticulosis of large intestine without hemorrhage; Diarrhea, unspecified type; Abdominal pain, unspecified location  (Diverticulosis of large intestine without hemorrhage [K57.30]; Diarrhea, unspecified type [R19.7]; Abdominal pain, unspecified location [R10.9]) MD Ruslan Flores MD          Anesthesia Type: MAC  Last vitals  BP   134/82 (08/24/17 1150)    Temp   36.3 °C (97.4 °F) (08/24/17 1135)    Pulse   75 (08/24/17 1150)   Resp   16 (08/24/17 1150)    SpO2   94 % (08/24/17 1150)      Post Anesthesia Care and Evaluation    Patient location during evaluation: PHASE II  Patient participation: complete - patient participated  Level of consciousness: awake and alert  Pain management: adequate  Airway patency: patent  Anesthetic complications: No anesthetic complications  PONV Status: none  Cardiovascular status: acceptable  Respiratory status: acceptable  Hydration status: acceptable

## 2017-08-24 NOTE — ANESTHESIA PREPROCEDURE EVALUATION
Anesthesia Evaluation     Patient summary reviewed and Nursing notes reviewed          Airway   Mallampati: III  TM distance: <3 FB  Neck ROM: full  possible difficult intubation  Dental - normal exam     Pulmonary - normal exam   (+) a smoker Former, COPD, asthma,   Cardiovascular - normal exam    (+) hypertension, hyperlipidemia      Neuro/Psych  (+) psychiatric history Depression,    GI/Hepatic/Renal/Endo    (+) obesity, morbid obesity,     Musculoskeletal     Abdominal  - normal exam  (+) obese,    Substance History - negative use     OB/GYN negative ob/gyn ROS         Other   (+) arthritis                                     Anesthesia Plan    ASA 3     MAC     intravenous induction   Anesthetic plan and risks discussed with patient.

## 2017-08-24 NOTE — PLAN OF CARE
Problem: Patient Care Overview (Adult)  Goal: Plan of Care Review  Outcome: Ongoing (interventions implemented as appropriate)    08/24/17 1000   Coping/Psychosocial Response Interventions   Plan Of Care Reviewed With patient   Patient Care Overview   Progress no change       Goal: Adult Individualization and Mutuality  Outcome: Ongoing (interventions implemented as appropriate)    08/24/17 1000   Individualization   Patient Specific Preferences na       Goal: Discharge Needs Assessment  Outcome: Ongoing (interventions implemented as appropriate)    08/24/17 1000   Discharge Needs Assessment   Concerns To Be Addressed no discharge needs identified   Discharge Disposition home or self-care   Living Environment   Transportation Available car         Problem: GI Endoscopy (Adult)  Goal: Signs and Symptoms of Listed Potential Problems Will be Absent or Manageable (GI Endoscopy)  Outcome: Ongoing (interventions implemented as appropriate)

## 2017-08-25 LAB
LAB AP CASE REPORT: NORMAL
Lab: NORMAL
PATH REPORT.FINAL DX SPEC: NORMAL
PATH REPORT.GROSS SPEC: NORMAL

## 2017-08-29 ENCOUNTER — TELEPHONE (OUTPATIENT)
Dept: GASTROENTEROLOGY | Facility: CLINIC | Age: 57
End: 2017-08-29

## 2017-08-29 NOTE — TELEPHONE ENCOUNTER
----- Message from Shar Tariq sent at 8/29/2017 11:44 AM EDT -----  Regarding: XIFAXAN  Contact: [phone]  PT CALLED STATED HAD SCOPE LAST WEEK AND WAITING ON A RX TO BE FILL..   Notes Recorded by Hugo Mims MD on 8/26/2017 at 9:04 AM  Only one adenoma, change colon recall to 5 yrs.  Star xifaxan 550mg po tid for 14 days, #42, 2 rf  Ov NP 6-8 weeks.  Patient called, advised of Dr. Mims's note.  F/u appt made for Sept. SHe verb understanding and is in agreement to the plan. Patient health maintenance record updated to reflect the need for a repeat colonoscopy in 5 years.

## 2017-09-29 RX ORDER — OMEPRAZOLE 40 MG/1
40 CAPSULE, DELAYED RELEASE ORAL DAILY
Qty: 90 CAPSULE | Refills: 1 | Status: SHIPPED | OUTPATIENT
Start: 2017-09-29 | End: 2018-04-11 | Stop reason: SDUPTHER

## 2017-10-05 RX ORDER — FLUNISOLIDE 0.25 MG/ML
SOLUTION NASAL
Qty: 3 BOTTLE | Refills: 3 | Status: SHIPPED | OUTPATIENT
Start: 2017-10-05 | End: 2018-04-11 | Stop reason: SDUPTHER

## 2017-11-30 RX ORDER — RIFAXIMIN 550 MG/1
TABLET ORAL
Qty: 42 TABLET | Refills: 2 | OUTPATIENT
Start: 2017-11-30

## 2017-11-30 NOTE — TELEPHONE ENCOUNTER
----- Message from Gopal Reddy sent at 11/30/2017  9:39 AM EST -----  Regarding: PT CALLED   Contact: [phone]   PT IS CALLING ABOUT MEDICATION XIFAXAN 550 MG tablet, SHE DON'T KNOW WHY IT COULDN'T GET REFILLED ?? PT WOULD LIKE A CALL BACK .

## 2017-11-30 NOTE — TELEPHONE ENCOUNTER
Called pt back. Advised that Dr Mims had prescribed the Xifaxan after her scope was completed near the end of August. Advised that he had given her the two week course with 2 refills, but MD had recommended for her to f/u 6-8 weeks after her scope. Advised that I see where she had an appt in September, but it looks like she had canceled that appt. Advised that this medication is an antibiotic and she needs to come in for her f/u appt after her scope to be evaluated. Pt verb understanding and states she will call back to make a f/u appt.

## 2017-12-27 ENCOUNTER — OFFICE VISIT (OUTPATIENT)
Dept: FAMILY MEDICINE CLINIC | Facility: CLINIC | Age: 57
End: 2017-12-27

## 2017-12-27 VITALS
WEIGHT: 214.7 LBS | DIASTOLIC BLOOD PRESSURE: 82 MMHG | HEIGHT: 55 IN | HEART RATE: 101 BPM | RESPIRATION RATE: 16 BRPM | TEMPERATURE: 97.8 F | BODY MASS INDEX: 49.69 KG/M2 | OXYGEN SATURATION: 91 % | SYSTOLIC BLOOD PRESSURE: 130 MMHG

## 2017-12-27 DIAGNOSIS — R68.89 FLU-LIKE SYMPTOMS: Primary | ICD-10-CM

## 2017-12-27 DIAGNOSIS — N32.81 OVERACTIVE BLADDER: ICD-10-CM

## 2017-12-27 PROBLEM — R10.9 ABDOMINAL PAIN: Status: RESOLVED | Noted: 2017-07-27 | Resolved: 2017-12-27

## 2017-12-27 PROBLEM — R19.7 DIARRHEA: Status: RESOLVED | Noted: 2017-07-27 | Resolved: 2017-12-27

## 2017-12-27 PROBLEM — G93.31 POST-INFLUENZA SYNDROME: Status: RESOLVED | Noted: 2017-03-18 | Resolved: 2017-12-27

## 2017-12-27 PROBLEM — G44.209 ACUTE TENSION-TYPE HEADACHE: Status: RESOLVED | Noted: 2017-03-18 | Resolved: 2017-12-27

## 2017-12-27 PROBLEM — N30.00 ACUTE CYSTITIS: Status: RESOLVED | Noted: 2017-06-16 | Resolved: 2017-12-27

## 2017-12-27 PROBLEM — J32.4 PANSINUSITIS: Status: RESOLVED | Noted: 2017-03-18 | Resolved: 2017-12-27

## 2017-12-27 PROBLEM — J40 BRONCHITIS: Status: RESOLVED | Noted: 2017-03-18 | Resolved: 2017-12-27

## 2017-12-27 PROBLEM — R10.32 LEFT LOWER QUADRANT PAIN: Status: RESOLVED | Noted: 2017-06-16 | Resolved: 2017-12-27

## 2017-12-27 LAB
EXPIRATION DATE: ABNORMAL
FLUAV AG NPH QL: ABNORMAL
FLUBV AG NPH QL: ABNORMAL
INTERNAL CONTROL: ABNORMAL
Lab: ABNORMAL

## 2017-12-27 PROCEDURE — 99214 OFFICE O/P EST MOD 30 MIN: CPT | Performed by: FAMILY MEDICINE

## 2017-12-27 PROCEDURE — 87804 INFLUENZA ASSAY W/OPTIC: CPT | Performed by: FAMILY MEDICINE

## 2017-12-27 RX ORDER — ALBUTEROL SULFATE 4 MG/1
4 TABLET ORAL 3 TIMES DAILY
COMMUNITY
End: 2017-12-27

## 2017-12-27 RX ORDER — OSELTAMIVIR PHOSPHATE 75 MG/1
75 CAPSULE ORAL 2 TIMES DAILY
Qty: 10 CAPSULE | Refills: 0 | Status: SHIPPED | OUTPATIENT
Start: 2017-12-27 | End: 2018-04-11

## 2017-12-27 RX ORDER — OXYBUTYNIN CHLORIDE 10 MG/1
10 TABLET, EXTENDED RELEASE ORAL DAILY
Qty: 30 TABLET | Refills: 0 | Status: SHIPPED | OUTPATIENT
Start: 2017-12-27 | End: 2018-04-11

## 2017-12-27 RX ORDER — HYDROCODONE BITARTRATE AND ACETAMINOPHEN 7.5; 325 MG/1; MG/1
1 TABLET ORAL EVERY 6 HOURS PRN
COMMUNITY
End: 2021-02-18

## 2017-12-27 RX ORDER — ALBUTEROL SULFATE 90 UG/1
2 AEROSOL, METERED RESPIRATORY (INHALATION) EVERY 4 HOURS PRN
COMMUNITY
End: 2018-07-16 | Stop reason: SDUPTHER

## 2017-12-27 NOTE — PROGRESS NOTES
Subjective   Flaquita Cr is a 57 y.o. female.  Patient is here to establish care.  She was previously seen by Dr. Drake.  Complaints today of flulike illness.  Also has overactive bladder.    Chief Complaint   Patient presents with   • Establish Care     Vidal Transfer   • Allergies   • Fatigue     body aches   • Headache        History of Present Illness  Flu-like symptoms  5-6 days ago had diarrhea, chills, this got better. one day ago more symptoms, worsening. Has mostly been sleeping and resting. Now having congestion, sniffling, headache, has taking Excederin. No fevers not a lot of coughing. Poor appetite.    Overactive bladder  Patient was started on mirabegron for this condition and it worked very well. However insurance would not cover it. They recommended trial of three other medications prior to using this one. She has been taking tolterodine since 8/2017 without relief.    Arthritis effecting back with sciatica symptoms, bilateral knees with most pain at medial and inferior aspect. She got a inversion table on-line that she has been using for her back pain. Feels like it has significantly helped her sciatica.  Patient reports that she has been treated with hydrocodone.  She does not currently get this prescription anymore.  Has some at home and has been taking more the past week or so related to her doing more around the holidays creating increased back pain.  She does report that the hydrocodone is not highly effective for her pain.  It does mass get a little but once she takes 1 finds she often wants to take another and then she just gets cloudy in the brain so she tries to avoid it.    Abdominal pain that is chronic. Also trouble with bowel movements. Think she may have IBS, was told by her GI doctor to start taking probiotics but she has not yet done this. She was started on rifaximin and this has helped significantly. She has not returned to the office for follow up and was told to do  "so for another refill.    The following portions of the patient's history were reviewed and updated as appropriate: allergies, current medications, past family history, past medical history, past social history, past surgical history and problem list.      Review of Systems   Constitutional: Positive for activity change, appetite change, chills, fatigue and fever.   HENT: Positive for congestion, rhinorrhea and sore throat.    Respiratory: Negative for cough.    Gastrointestinal: Positive for abdominal pain, diarrhea and nausea.   Genitourinary: Positive for frequency and urgency. Negative for difficulty urinating and dysuria.   Musculoskeletal: Positive for myalgias.       Objective   Blood pressure 130/82, pulse 101, temperature 97.8 °F (36.6 °C), temperature source Oral, resp. rate 16, height 64 cm (25.2\"), weight 97.4 kg (214 lb 11.2 oz), SpO2 91 %.  Physical Exam   Constitutional: She is oriented to person, place, and time. She appears well-nourished. No distress.   HENT:   Mouth/Throat: Oropharynx is clear and moist. No oropharyngeal exudate.   Eyes: Right eye exhibits no discharge. Left eye exhibits no discharge.   Bilateral eyes with injected conjunctiva and watery   Cardiovascular: Normal rate, regular rhythm and normal heart sounds.  Exam reveals no friction rub.    No murmur heard.  Pulmonary/Chest: Effort normal and breath sounds normal. No respiratory distress. She has no wheezes.   Abdominal: Soft. She exhibits no distension.   Neurological: She is alert and oriented to person, place, and time.   Antalgic gait favors right side she is able to climb onto the exam table independently   Psychiatric: She has a normal mood and affect. Her behavior is normal.   Vitals reviewed.      Assessment/Plan   Flaquita was seen today for establish care, allergies, fatigue and headache.    Diagnoses and all orders for this visit:    Flu-like symptoms  -     POCT Influenza A/B    Overactive bladder    Other orders  -     " oseltamivir (TAMIFLU) 75 MG capsule; Take 1 capsule by mouth 2 (Two) Times a Day.  -     Spacer/Aero Chamber Mouthpiece misc; Use spacer with all inhaled treatments  -     oxybutynin XL (DITROPAN-XL) 10 MG 24 hr tablet; Take 1 tablet by mouth Daily.    flu positive  Will treat with tamiflu  Encouraged fluid hydration with water,  Pt only drinking small amount of coffee    OAB  D/c tolterodine and try another agent    Pt to return in 3-6 months and will do physical at that visit

## 2018-03-13 ENCOUNTER — TELEPHONE (OUTPATIENT)
Dept: FAMILY MEDICINE CLINIC | Facility: CLINIC | Age: 58
End: 2018-03-13

## 2018-03-13 NOTE — TELEPHONE ENCOUNTER
Patient called stating that she went urgent care for body aches, chills and sweats. Patient was diagnosed with viral syndrome and sent home with no medications. Patient was not tested for flu, did not know what she should do, has an appointment 3/21

## 2018-04-04 RX ORDER — CELECOXIB 200 MG/1
200 CAPSULE ORAL
Qty: 60 CAPSULE | Refills: 3 | OUTPATIENT
Start: 2018-04-04

## 2018-04-11 ENCOUNTER — OFFICE VISIT (OUTPATIENT)
Dept: INTERNAL MEDICINE | Facility: CLINIC | Age: 58
End: 2018-04-11

## 2018-04-11 VITALS
DIASTOLIC BLOOD PRESSURE: 66 MMHG | SYSTOLIC BLOOD PRESSURE: 112 MMHG | TEMPERATURE: 98.3 F | WEIGHT: 199.3 LBS | HEART RATE: 97 BPM | HEIGHT: 64 IN | OXYGEN SATURATION: 97 % | BODY MASS INDEX: 34.02 KG/M2

## 2018-04-11 DIAGNOSIS — F17.200 CURRENT EVERY DAY SMOKER: ICD-10-CM

## 2018-04-11 DIAGNOSIS — J45.20 MILD INTERMITTENT ASTHMA, UNSPECIFIED WHETHER COMPLICATED: ICD-10-CM

## 2018-04-11 DIAGNOSIS — F32.A DEPRESSION, UNSPECIFIED DEPRESSION TYPE: ICD-10-CM

## 2018-04-11 DIAGNOSIS — N32.81 OVERACTIVE BLADDER: ICD-10-CM

## 2018-04-11 DIAGNOSIS — Z00.00 HEALTH CARE MAINTENANCE: ICD-10-CM

## 2018-04-11 DIAGNOSIS — J44.9 CHRONIC OBSTRUCTIVE PULMONARY DISEASE, UNSPECIFIED COPD TYPE (HCC): ICD-10-CM

## 2018-04-11 DIAGNOSIS — K58.0 IRRITABLE BOWEL SYNDROME WITH DIARRHEA: Primary | ICD-10-CM

## 2018-04-11 DIAGNOSIS — R53.82 CHRONIC FATIGUE: ICD-10-CM

## 2018-04-11 PROBLEM — J45.909 ASTHMA: Status: ACTIVE | Noted: 2018-04-11

## 2018-04-11 PROBLEM — K58.9 IBS (IRRITABLE BOWEL SYNDROME): Status: ACTIVE | Noted: 2018-04-11

## 2018-04-11 PROCEDURE — 99214 OFFICE O/P EST MOD 30 MIN: CPT | Performed by: NURSE PRACTITIONER

## 2018-04-11 RX ORDER — MONTELUKAST SODIUM 10 MG/1
10 TABLET ORAL NIGHTLY
Qty: 30 TABLET | Refills: 5 | Status: SHIPPED | OUTPATIENT
Start: 2018-04-11 | End: 2018-12-27 | Stop reason: SDUPTHER

## 2018-04-11 RX ORDER — OMEPRAZOLE 40 MG/1
40 CAPSULE, DELAYED RELEASE ORAL DAILY
Qty: 30 CAPSULE | Refills: 5 | Status: SHIPPED | OUTPATIENT
Start: 2018-04-11 | End: 2018-07-16 | Stop reason: SDUPTHER

## 2018-04-11 RX ORDER — FLUOXETINE HYDROCHLORIDE 20 MG/1
20 CAPSULE ORAL DAILY
Qty: 30 CAPSULE | Refills: 2 | Status: SHIPPED | OUTPATIENT
Start: 2018-04-11 | End: 2018-08-12 | Stop reason: SDUPTHER

## 2018-04-11 RX ORDER — CELECOXIB 200 MG/1
200 CAPSULE ORAL 2 TIMES DAILY
Qty: 60 CAPSULE | Refills: 5 | Status: SHIPPED | OUTPATIENT
Start: 2018-04-11 | End: 2018-05-22 | Stop reason: SDUPTHER

## 2018-04-11 RX ORDER — FLUNISOLIDE 0.25 MG/ML
2 SOLUTION NASAL EVERY 12 HOURS
Qty: 3 BOTTLE | Refills: 3 | Status: SHIPPED | OUTPATIENT
Start: 2018-04-11 | End: 2018-07-16 | Stop reason: SDUPTHER

## 2018-04-11 NOTE — PROGRESS NOTES
"Subjective   Flaquita Cr is a 58 y.o. female here as a new patient here to establish care and for med refill.  Patient complains of overactive bladder and states she has tried Tolterodine and Oxybutynin with no success.    History of Present Illness   New patient to establish care. Previous provider was Dr. Beasley and Megan Haque X1.   She has c/o abd cramping last week. They have been intermittent. She has taken immodium with pepto. She has had diarrhea off and on as well.   She does see Dr. Mims for IBS with diarrhea. She denies fevers or blood in stool. She has taken xifaxin with good relief.     She states she went on atkins diet recently and thinks this may have triggered things.     \"I'm sick all the time.\"    Asthma- having trouble getting insurance to cover Dulera. Has been out for a month.   Needs RX refills.     OAB- has failed tolterodine and oxybutynin, would like to return to [facility] as this has worked in the past    Has been on disability for COPD/asthma, aortofemoral bypass, bulging discs in her back, takes muscle relaxer occasionally for this.     Depression- tired of trying, feels she is always sick. Has done well with prozac.     Sees Dr. Stewart for allergist.     Smoking 3/4 ppd  The following portions of the patient's history were reviewed and updated as appropriate: allergies, current medications, past family history, past medical history, past social history, past surgical history and problem list.    Review of Systems   Constitutional: Negative.    Respiratory: Positive for shortness of breath (intermittently).    Cardiovascular: Negative.    Gastrointestinal: Positive for abdominal pain and diarrhea. Negative for blood in stool.   Allergic/Immunologic: Positive for environmental allergies.   Psychiatric/Behavioral: Positive for dysphoric mood. Negative for suicidal ideas. The patient is not nervous/anxious.        Objective   Physical Exam   Constitutional: She appears " well-developed and well-nourished.   Neck: Normal range of motion. Neck supple. No thyromegaly present.   Cardiovascular: Normal rate, regular rhythm, normal heart sounds and intact distal pulses.    Pulmonary/Chest: Effort normal. She has decreased breath sounds.   Skin: Skin is warm and dry.   Psychiatric: She has a normal mood and affect. Her behavior is normal. Judgment and thought content normal.       Assessment/Plan   There are no diagnoses linked to this encounter.    1. IBS - needs to see GI back, hydrate well, bland diet, FODMAP diet hand out given  2. Asthma/COPD- will give sample of dulera today, she will find out what is covered and we will send it in.   3. OAB- failed 2 meds, restart myrbetriq  4. Depression- try prozac,has done well with this in the past, F/U in 4 weeks.   5. Fatigue- check B12 level, rest and work on exercise in the pool.  6. Current every day smoker- encouraged pt to quit

## 2018-04-12 PROBLEM — G47.33 OSA (OBSTRUCTIVE SLEEP APNEA): Status: ACTIVE | Noted: 2018-04-12

## 2018-04-12 PROBLEM — I73.9 PAD (PERIPHERAL ARTERY DISEASE) (HCC): Status: ACTIVE | Noted: 2018-04-12

## 2018-04-12 PROBLEM — K21.9 GERD (GASTROESOPHAGEAL REFLUX DISEASE): Status: ACTIVE | Noted: 2018-04-12

## 2018-04-17 ENCOUNTER — TELEPHONE (OUTPATIENT)
Dept: INTERNAL MEDICINE | Facility: CLINIC | Age: 58
End: 2018-04-17

## 2018-04-17 RX ORDER — BUDESONIDE AND FORMOTEROL FUMARATE DIHYDRATE 160; 4.5 UG/1; UG/1
2 AEROSOL RESPIRATORY (INHALATION)
Qty: 10.2 G | Refills: 3 | Status: SHIPPED | OUTPATIENT
Start: 2018-04-17 | End: 2018-05-30

## 2018-04-17 NOTE — TELEPHONE ENCOUNTER
----- Message from ISAAK Bruner sent at 4/17/2018  3:52 PM EDT -----  Finish Dulera then  symbicort Rx at pharmacy, already sent.   ----- Message -----  From: Cody Fragoso  Sent: 4/17/2018   3:01 PM  To: ISAAK Bruner    Patient called and said her insurance will cover Symbicort. She said you asked her to call us with this information.    Also - she said she would like to be referred to [facility] for weight loss classes. Ok to refer?  ----- Message -----  From: Onelia Aldana  Sent: 4/16/2018   3:47 PM  To: Cody Fragoso    PT called and wanted to talk to you about information she recieved from the insurance company.       PHONE: [phone]

## 2018-05-22 RX ORDER — CELECOXIB 200 MG/1
200 CAPSULE ORAL 2 TIMES DAILY
Qty: 60 CAPSULE | Refills: 5 | Status: SHIPPED | OUTPATIENT
Start: 2018-05-22 | End: 2018-11-30 | Stop reason: SDUPTHER

## 2018-05-25 LAB
25(OH)D3+25(OH)D2 SERPL-MCNC: 30.8 NG/ML (ref 30–100)
ALBUMIN SERPL-MCNC: 4.2 G/DL (ref 3.5–5.2)
ALBUMIN/GLOB SERPL: 1.4 G/DL
ALP SERPL-CCNC: 97 U/L (ref 39–117)
ALT SERPL-CCNC: 30 U/L (ref 1–33)
AST SERPL-CCNC: 26 U/L (ref 1–32)
BASOPHILS # BLD AUTO: 0.02 10*3/MM3 (ref 0–0.2)
BASOPHILS NFR BLD AUTO: 0.3 % (ref 0–1.5)
BILIRUB SERPL-MCNC: 0.2 MG/DL (ref 0.1–1.2)
BUN SERPL-MCNC: 16 MG/DL (ref 6–20)
BUN/CREAT SERPL: 17.6 (ref 7–25)
CALCIUM SERPL-MCNC: 9.3 MG/DL (ref 8.6–10.5)
CHLORIDE SERPL-SCNC: 102 MMOL/L (ref 98–107)
CHOLEST SERPL-MCNC: 263 MG/DL (ref 0–200)
CO2 SERPL-SCNC: 27.3 MMOL/L (ref 22–29)
CREAT SERPL-MCNC: 0.91 MG/DL (ref 0.57–1)
EOSINOPHIL # BLD AUTO: 0.08 10*3/MM3 (ref 0–0.7)
EOSINOPHIL NFR BLD AUTO: 1.3 % (ref 0.3–6.2)
ERYTHROCYTE [DISTWIDTH] IN BLOOD BY AUTOMATED COUNT: 16.2 % (ref 11.7–13)
FOLATE SERPL-MCNC: 16.14 NG/ML (ref 4.78–24.2)
GFR SERPLBLD CREATININE-BSD FMLA CKD-EPI: 63 ML/MIN/1.73
GFR SERPLBLD CREATININE-BSD FMLA CKD-EPI: 77 ML/MIN/1.73
GLOBULIN SER CALC-MCNC: 2.9 GM/DL
GLUCOSE SERPL-MCNC: 103 MG/DL (ref 65–99)
HCT VFR BLD AUTO: 43.7 % (ref 35.6–45.5)
HDLC SERPL-MCNC: 58 MG/DL (ref 40–60)
HGB BLD-MCNC: 13.7 G/DL (ref 11.9–15.5)
IMM GRANULOCYTES # BLD: 0 10*3/MM3 (ref 0–0.03)
IMM GRANULOCYTES NFR BLD: 0 % (ref 0–0.5)
LDLC SERPL CALC-MCNC: 184 MG/DL (ref 0–100)
LDLC/HDLC SERPL: 3.17 {RATIO}
LYMPHOCYTES # BLD AUTO: 1.47 10*3/MM3 (ref 0.9–4.8)
LYMPHOCYTES NFR BLD AUTO: 24.5 % (ref 19.6–45.3)
MCH RBC QN AUTO: 29.1 PG (ref 26.9–32)
MCHC RBC AUTO-ENTMCNC: 31.4 G/DL (ref 32.4–36.3)
MCV RBC AUTO: 92.8 FL (ref 80.5–98.2)
MONOCYTES # BLD AUTO: 0.48 10*3/MM3 (ref 0.2–1.2)
MONOCYTES NFR BLD AUTO: 8 % (ref 5–12)
NEUTROPHILS # BLD AUTO: 3.94 10*3/MM3 (ref 1.9–8.1)
NEUTROPHILS NFR BLD AUTO: 65.9 % (ref 42.7–76)
PLATELET # BLD AUTO: 304 10*3/MM3 (ref 140–500)
POTASSIUM SERPL-SCNC: 4.9 MMOL/L (ref 3.5–5.2)
PROT SERPL-MCNC: 7.1 G/DL (ref 6–8.5)
RBC # BLD AUTO: 4.71 10*6/MM3 (ref 3.9–5.2)
SODIUM SERPL-SCNC: 142 MMOL/L (ref 136–145)
TRIGL SERPL-MCNC: 105 MG/DL (ref 0–150)
TSH SERPL DL<=0.005 MIU/L-ACNC: 2.66 MIU/ML (ref 0.27–4.2)
VIT B12 SERPL-MCNC: 402 PG/ML (ref 211–946)
VLDLC SERPL CALC-MCNC: 21 MG/DL (ref 5–40)
WBC # BLD AUTO: 5.99 10*3/MM3 (ref 4.5–10.7)

## 2018-05-30 ENCOUNTER — OFFICE VISIT (OUTPATIENT)
Dept: INTERNAL MEDICINE | Facility: CLINIC | Age: 58
End: 2018-05-30

## 2018-05-30 VITALS
TEMPERATURE: 97.8 F | HEIGHT: 64 IN | WEIGHT: 201.9 LBS | SYSTOLIC BLOOD PRESSURE: 122 MMHG | DIASTOLIC BLOOD PRESSURE: 82 MMHG | OXYGEN SATURATION: 93 % | BODY MASS INDEX: 34.47 KG/M2 | HEART RATE: 109 BPM

## 2018-05-30 DIAGNOSIS — M54.42 CHRONIC BILATERAL LOW BACK PAIN WITH LEFT-SIDED SCIATICA: ICD-10-CM

## 2018-05-30 DIAGNOSIS — R73.9 HYPERGLYCEMIA: ICD-10-CM

## 2018-05-30 DIAGNOSIS — Z00.00 HEALTH CARE MAINTENANCE: Primary | ICD-10-CM

## 2018-05-30 DIAGNOSIS — F32.A DEPRESSION, UNSPECIFIED DEPRESSION TYPE: ICD-10-CM

## 2018-05-30 DIAGNOSIS — E78.5 HYPERLIPIDEMIA, UNSPECIFIED HYPERLIPIDEMIA TYPE: ICD-10-CM

## 2018-05-30 DIAGNOSIS — E55.9 VITAMIN D DEFICIENCY: ICD-10-CM

## 2018-05-30 DIAGNOSIS — G89.29 CHRONIC BILATERAL LOW BACK PAIN WITH LEFT-SIDED SCIATICA: ICD-10-CM

## 2018-05-30 DIAGNOSIS — J30.9 CHRONIC ALLERGIC RHINITIS, UNSPECIFIED SEASONALITY, UNSPECIFIED TRIGGER: ICD-10-CM

## 2018-05-30 DIAGNOSIS — F17.200 CURRENT EVERY DAY SMOKER: ICD-10-CM

## 2018-05-30 DIAGNOSIS — Z12.31 VISIT FOR SCREENING MAMMOGRAM: ICD-10-CM

## 2018-05-30 DIAGNOSIS — R51.9 FRONTAL HEADACHE: ICD-10-CM

## 2018-05-30 PROBLEM — M54.9 CHRONIC BACK PAIN: Status: ACTIVE | Noted: 2017-03-18

## 2018-05-30 PROBLEM — M85.80 OSTEOPENIA: Status: ACTIVE | Noted: 2018-05-30

## 2018-05-30 PROCEDURE — 99396 PREV VISIT EST AGE 40-64: CPT | Performed by: NURSE PRACTITIONER

## 2018-05-30 PROCEDURE — G0009 ADMIN PNEUMOCOCCAL VACCINE: HCPCS | Performed by: NURSE PRACTITIONER

## 2018-05-30 PROCEDURE — 90732 PPSV23 VACC 2 YRS+ SUBQ/IM: CPT | Performed by: NURSE PRACTITIONER

## 2018-05-30 RX ORDER — CETIRIZINE HYDROCHLORIDE 10 MG/1
10 TABLET ORAL DAILY
COMMUNITY
End: 2020-04-17 | Stop reason: SDUPTHER

## 2018-05-30 RX ORDER — FLUTICASONE PROPIONATE 50 MCG
2 SPRAY, SUSPENSION (ML) NASAL DAILY
COMMUNITY
End: 2020-06-30 | Stop reason: SDUPTHER

## 2018-05-30 RX ORDER — IBUPROFEN 200 MG
200 TABLET ORAL EVERY 6 HOURS PRN
COMMUNITY
End: 2018-09-07 | Stop reason: HOSPADM

## 2018-05-30 NOTE — PROGRESS NOTES
Subjective   Flaquita Cr is a 58 y.o. female here for CPE.    History of Present Illness   The patient is here today for CPE and lab work F/U.   TAI- does not wear a CPAP   COPD/current every day smoker- smoking now an e-cig, has seen pulmonary, just started on Trelegy  Depression- started on prozac at last visit, she took for about 2 weeks and then stopped due to headaches. She reports that the headaches did not resolve.   Headaches are frontal, light sensitivity. Some sound sensitivity.   Chronic back pain- uses lortab sparingly, she would prefer not to take these.   PAD- pain with walking, will discuss with Dr. Bright.   OAB- much better with myrbetriq    Has been on disability for COPD/asthma, aortofemoral bypass, bulging discs in her back, takes muscle relaxer occasionally for this.   The following portions of the patient's history were reviewed and updated as appropriate: allergies, current medications, past family history, past medical history, past social history, past surgical history and problem list.    Review of Systems   Constitutional: Positive for fatigue. Negative for chills and fever.   HENT: Positive for rhinorrhea and sinus pressure. Negative for ear pain and sore throat.    Eyes: Negative.    Respiratory: Positive for cough (intermittent), shortness of breath (intermittent) and wheezing (intermittent).    Cardiovascular: Negative.    Gastrointestinal: Positive for abdominal distention (intermittent, IBS) and diarrhea (intermittent, IBS). Negative for constipation.   Endocrine: Negative.    Genitourinary: Positive for frequency. Negative for dysuria.   Musculoskeletal: Positive for back pain (intermittently).   Skin: Negative.    Allergic/Immunologic: Positive for environmental allergies. Negative for food allergies.   Neurological: Positive for headaches. Negative for dizziness, tremors, seizures, syncope, facial asymmetry, speech difficulty, weakness, light-headedness and numbness.    Hematological: Negative.    Psychiatric/Behavioral: Positive for dysphoric mood. Negative for suicidal ideas. The patient is nervous/anxious.        Objective   Physical Exam   Constitutional: She is oriented to person, place, and time. Vital signs are normal. She appears well-developed and well-nourished.   HENT:   Right Ear: Hearing, tympanic membrane, external ear and ear canal normal.   Left Ear: Hearing, tympanic membrane, external ear and ear canal normal.   Nose: Nose normal.   Mouth/Throat: Uvula is midline, oropharynx is clear and moist and mucous membranes are normal.   Eyes: Conjunctivae, EOM and lids are normal. Pupils are equal, round, and reactive to light.   Neck: Normal range of motion. Neck supple. Normal carotid pulses present. Carotid bruit is not present. No thyromegaly present.   Cardiovascular: Normal rate, regular rhythm, normal heart sounds and intact distal pulses.    Pulmonary/Chest: Effort normal and breath sounds normal. She exhibits no mass, no tenderness, no laceration, no crepitus, no edema, no deformity, no swelling and no retraction. Right breast exhibits no inverted nipple, no mass, no nipple discharge, no skin change and no tenderness. Left breast exhibits no inverted nipple, no mass, no nipple discharge, no skin change and no tenderness. Breasts are symmetrical. There is no breast swelling.   Abdominal: Soft. Normal appearance, normal aorta and bowel sounds are normal. There is no hepatosplenomegaly. There is no tenderness.   Genitourinary: No breast tenderness, discharge or bleeding.   Genitourinary Comments: Pt deferred   Musculoskeletal: Normal range of motion.   Lymphadenopathy:     She has no cervical adenopathy.        Right: No inguinal and no supraclavicular adenopathy present.        Left: No inguinal and no supraclavicular adenopathy present.   Neurological: She is alert and oriented to person, place, and time. She has normal strength. No cranial nerve deficit or  sensory deficit.   Reflex Scores:       Patellar reflexes are 0 on the right side and 0 on the left side.  Skin: Skin is warm, dry and intact.   Psychiatric: She has a normal mood and affect. Her speech is normal and behavior is normal. Judgment and thought content normal. Cognition and memory are normal.       Assessment/Plan   There are no diagnoses linked to this encounter.    1. HCM- restart aqua aerobics, work on healthy diet and exercise  2. Depression/Anxiety- restart, and call when increase in dose needed.   3. Frontal headaches- benadryl at night, sudafed PRN  4. AR- will place referral to family allergy and asthma  5. Current every day smoker- cut back on nicotine once monthly. Will order low dose CT screening  6. Hyperglycemia- check A1C with next lab draw., work on watching carbs and sugars  7. HPL- CVD risk 7%, suggest statin, pt defers, needs vascular screening  8. Vit D def- start Vi tD3 2000 IU daily    Pneumonovax- today  Dentist- due  EKG next week at cardiology

## 2018-06-06 ENCOUNTER — OFFICE VISIT (OUTPATIENT)
Dept: CARDIOLOGY | Facility: CLINIC | Age: 58
End: 2018-06-06

## 2018-06-06 VITALS
BODY MASS INDEX: 33.97 KG/M2 | SYSTOLIC BLOOD PRESSURE: 146 MMHG | HEIGHT: 64 IN | WEIGHT: 199 LBS | HEART RATE: 103 BPM | DIASTOLIC BLOOD PRESSURE: 84 MMHG

## 2018-06-06 DIAGNOSIS — F17.200 SMOKING: ICD-10-CM

## 2018-06-06 DIAGNOSIS — I73.9 PAD (PERIPHERAL ARTERY DISEASE) (HCC): Primary | ICD-10-CM

## 2018-06-06 DIAGNOSIS — M54.17 LUMBOSACRAL RADICULOPATHY: ICD-10-CM

## 2018-06-06 PROCEDURE — 93000 ELECTROCARDIOGRAM COMPLETE: CPT | Performed by: INTERNAL MEDICINE

## 2018-06-06 PROCEDURE — 99204 OFFICE O/P NEW MOD 45 MIN: CPT | Performed by: INTERNAL MEDICINE

## 2018-06-06 RX ORDER — CHOLECALCIFEROL (VITAMIN D3) 50 MCG
2000 TABLET ORAL DAILY
COMMUNITY
End: 2020-04-27 | Stop reason: SDUPTHER

## 2018-06-06 NOTE — PROGRESS NOTES
Subjective:     Encounter Date:06/06/2018      Patient ID: Flaquita Cr is a 58 y.o. female.    Chief Complaint: PAD    History of Present Illness    Dear Cadence Archuleta:    I had the pleasure of seeing the patient in cardiovascular evaluation today.  As you well know, she is a ifeanyi, 58-year-old woman who is the wife of my patient.  She has a history of smoking and peripheral arterial disease.  She has chronic obstructive pulmonary disease and emphysema.  She had aortobifemoral bypass surgery in 2011 due to claudication.      After her surgery, she said that she could walk better.  She did have some complications related to the anesthesia however.    She has been doing well up until recently.  She says that, for several months, she has lost feeling in her left toe.  She says that, when she walks, she gets pain in both legs.  She also has some low back pain, hip pain, and even some tingling in her left arm.    She has been trying to quit smoking but has not been successful yet.  She denies any complaints of lower extremity ulceration or infection.  She has no discoloration of her toes.        Review of Systems   All other systems reviewed and are negative.    Family History   Problem Relation Age of Onset   • Osteoporosis Mother    • Atrial fibrillation Mother         pacemaker   • Arthritis Mother    • Macular degeneration Father    • Arthritis Sister    • Gout Brother    • Arthritis Brother    • Colon cancer Maternal Aunt    • Uterine cancer Maternal Aunt    • Breast cancer Neg Hx      Social History   Substance Use Topics   • Smoking status: Current Every Day Smoker     Packs/day: 1.50     Years: 40.00   • Smokeless tobacco: Never Used      Comment: using e-cig since 2010   • Alcohol use Yes      Comment: once per year, holidays         ECG 12 Lead  Date/Time: 6/6/2018 11:51 AM  Performed by: SONIDO GARCIA  Authorized by: SONIDO GARCIA   Previous ECG: no previous ECG available  Rhythm: sinus tachycardia  BPM:  103  Conduction: right bundle branch block and LPFB               Objective:     Physical Exam   Constitutional: She is oriented to person, place, and time. She appears well-developed and well-nourished.   HENT:   Head: Normocephalic and atraumatic.   Neck: Normal range of motion. Neck supple.   Cardiovascular: Normal rate, regular rhythm and normal heart sounds.    Pulmonary/Chest: Effort normal and breath sounds normal.   Abdominal: Soft. Bowel sounds are normal.   Musculoskeletal: Normal range of motion.   Neurological: She is alert and oriented to person, place, and time.   Skin: Skin is warm and dry.   Psychiatric: She has a normal mood and affect. Her behavior is normal. Thought content normal.   Vitals reviewed.      Lab Review:       Assessment:          Diagnosis Plan   1. PAD (peripheral artery disease)     2. Smoking            Plan:       It was a pleasure to see your patient in cardiovascular evaluation today.  She is a ifeanyi, 58-year-old woman with history of smoking.  She has had aortobifemoral bypass due to peripheral arterial disease.  I performed handheld Doppler examination of her lower extremities today and found her pulses to be excellent throughout.  I do not think that this symptom of leg pain and numbness is related to peripheral arterial disease.  Rather, I think she more likely suffers from cervical and lumbar spine disease.  I have recommended that she see Dr. Saldivar regarding evaluation for this and possible enrollment in physical therapy.  She will see me again in one year or sooner if symptoms warrant.

## 2018-06-13 ENCOUNTER — CLINICAL SUPPORT (OUTPATIENT)
Dept: OTHER | Facility: HOSPITAL | Age: 58
End: 2018-06-13

## 2018-06-13 ENCOUNTER — HOSPITAL ENCOUNTER (OUTPATIENT)
Dept: PET IMAGING | Facility: HOSPITAL | Age: 58
Discharge: HOME | End: 2018-06-13

## 2018-06-13 ENCOUNTER — HOSPITAL ENCOUNTER (OUTPATIENT)
Dept: MAMMOGRAPHY | Facility: HOSPITAL | Age: 58
Discharge: HOME | End: 2018-06-13
Admitting: NURSE PRACTITIONER

## 2018-06-13 DIAGNOSIS — Z12.2 SCREENING FOR MALIGNANT NEOPLASM OF RESPIRATORY ORGAN: Primary | ICD-10-CM

## 2018-06-13 DIAGNOSIS — F17.210 SMOKING GREATER THAN 30 PACK YEARS: ICD-10-CM

## 2018-06-13 DIAGNOSIS — Z87.891 HISTORY OF SMOKING 30 OR MORE PACK YEARS: ICD-10-CM

## 2018-06-13 DIAGNOSIS — Z12.2 SCREENING FOR MALIGNANT NEOPLASM OF RESPIRATORY ORGAN: ICD-10-CM

## 2018-06-13 PROCEDURE — G0296 VISIT TO DETERM LDCT ELIG: HCPCS | Performed by: CLINICAL NURSE SPECIALIST

## 2018-06-13 PROCEDURE — G0297 LDCT FOR LUNG CA SCREEN: HCPCS

## 2018-06-13 PROCEDURE — 77067 SCR MAMMO BI INCL CAD: CPT

## 2018-06-17 VITALS — WEIGHT: 201.4 LBS | HEIGHT: 64 IN | BODY MASS INDEX: 34.38 KG/M2

## 2018-06-17 NOTE — PROGRESS NOTES
[facility] Low-Dose Lung Cancer CT Screening Visit    Flaquita Cr is a pleasant 58 y.o. female seen today at the request of ISAAK Bonilla in our Multidisciplinary Clinic, being seen for Lung Cancer Screening Counseling and a Shared Decision Making Visit prior to Low-Dose Lung Cancer Screening CT exam.    SMOKING HISTORY:   History   Smoking Status   • Current Every Day Smoker   • Years: 0.20   • Types: Cigarettes, Electronic Cigarette   Smokeless Tobacco   • Never Used     Comment: Began smoking at age 10.  Smoked 1 ppd for 15 years, 2 ppd for 18 years, and 2.5 ppd for 15 years for an 88.5 pack year history.  Currently smoking 4 tobacco cigarettes daily with frequent use of an e-cigarette.  Using e-cig since 2010.       Flaquita Cr is currently smoking about 4 tobacco cigarettes per day with an 88.5 pack year history.  She frequently uses her electronic cigarette and has used it for the past 7 years.  Reports no use of alternate forms of tobacco, marijuana or other substances.  Based on the recommendation of the [place] Preventive Services Task Force, this patient is at high risk for lung cancer and a low-dose CT screening scan is recommended.     We discussed the connection between Radon and Lung Cancer and the availability of free test kits.  She has a new home with a basement.  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 No known second-hand smoke exposure as a child and no current exposure.    The patient has had no hemoptysis, unintentional weight loss or increasing shortness of breath. The patient is asymptomatic and has no signs or symptoms of lung cancer.   The patient reports no personal history of cancer.  Additionally, reports no family history of lung cancer. Her father had head and neck cancer.  She has sleep apnea but does not tolerate CPAP.  She also has asthma.  She has had some vascular issue int he past and has had a  femoral artery bypass.    Together we discussed the potential benefits and potential harms of being screened for lung cancer including the potential for follow-up diagnostic testing, risk for over diagnosis, false positive rate and total radiation exposure using the [facility] standardized decision aid. We reviewed the patient's smoking history and counseled on the importance and health benefits of quitting smoking.    Smoking Cessation  DISCUSSION HELD TODAY:     We discussed that there are a number of resources and tobacco cessation interventions to assist with smoking cessation including the 1-800-Quit Now line, Health Department programs, [place] [facility] resources, and use of the U.S[place] Department of Health and Human Services five keys for quitting and quit plan worksheet.  On a scale of zero to ten, the patient rates their motivation and readiness to quit at a 0 out of 10 today.  She likes to smoke and has no desire to quit but knows that she needs to.  She is under a lot of stress at home.  She has tried a number of methods in the past including hypnosis and nicotine replacement.    Recommendations for continued lung cancer screening:      We discussed the NCCN guidelines for lung cancer screening and the patient verbalized understanding that annual screening is recommended until fifteen years beyond smoking as long as they have no other disease or comorbidity that would prevent them from receiving cancer treatments such as surgery should a lung cancer be detected. The [facility] Lung Cancer Screening Shared Decision-Making Tool was utilized as an aid in discussing whether or not screening was right. The patient has decided to proceed with a Low Dose Lung Cancer Screening CT today. The patient is aware that the results of his screening will be shared with the referring provider or PCP as well.       The patient verbalizes understanding that results of this low dose lung CT exam will  be called and that assistance will be provided in arranging any necessary follow-up.    After review of the NCCN guidelines and recommendations for ongoing screening, the patient verbalized understanding of recommendations for follow-up. We discussed the importance of adherence to continued annual screening until 15 years beyond smoking or until other life-limiting comorbidities are present. We discussed the impact of comorbidities and ability and willing to undergo diagnosis and treatment.    The patient has been counseled on the health benefits of quitting tobacco, smoking cessation strategies and resources, as well as the importance of adherence to annual lung cancer low-dose CT screening.     Shannan Zuleta, MSN, APRN, ACNS-BC, AOCN, CHPN  Clinical Nurse Specialist  [facility]

## 2018-06-18 ENCOUNTER — HOSPITAL ENCOUNTER (OUTPATIENT)
Dept: PHYSICAL THERAPY | Facility: HOSPITAL | Age: 58
Setting detail: THERAPIES SERIES
Discharge: HOME | End: 2018-06-18

## 2018-06-18 DIAGNOSIS — R53.1 WEAKNESS GENERALIZED: ICD-10-CM

## 2018-06-18 DIAGNOSIS — G89.29 CHRONIC BILATERAL LOW BACK PAIN, WITH SCIATICA PRESENCE UNSPECIFIED: Primary | ICD-10-CM

## 2018-06-18 DIAGNOSIS — Z78.9 IMPAIRED MOBILITY AND ADLS: ICD-10-CM

## 2018-06-18 DIAGNOSIS — R29.3 POSTURE IMBALANCE: ICD-10-CM

## 2018-06-18 DIAGNOSIS — Z74.09 IMPAIRED MOBILITY AND ADLS: ICD-10-CM

## 2018-06-18 DIAGNOSIS — M54.5 CHRONIC BILATERAL LOW BACK PAIN, WITH SCIATICA PRESENCE UNSPECIFIED: Primary | ICD-10-CM

## 2018-06-18 PROCEDURE — 97110 THERAPEUTIC EXERCISES: CPT

## 2018-06-18 PROCEDURE — 97161 PT EVAL LOW COMPLEX 20 MIN: CPT
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
## 2018-07-09 ENCOUNTER — HOSPITAL ENCOUNTER (OUTPATIENT)
Dept: PHYSICAL THERAPY | Facility: HOSPITAL | Age: 58
Setting detail: THERAPIES SERIES
Discharge: HOME | End: 2018-07-09

## 2018-07-11 ENCOUNTER — HOSPITAL ENCOUNTER (OUTPATIENT)
Dept: PHYSICAL THERAPY | Facility: HOSPITAL | Age: 58
Setting detail: THERAPIES SERIES
Discharge: HOME | End: 2018-07-11

## 2018-07-11 DIAGNOSIS — R29.3 POSTURE IMBALANCE: ICD-10-CM

## 2018-07-11 DIAGNOSIS — Z78.9 IMPAIRED MOBILITY AND ADLS: ICD-10-CM

## 2018-07-11 DIAGNOSIS — G89.29 CHRONIC BILATERAL LOW BACK PAIN, WITH SCIATICA PRESENCE UNSPECIFIED: Primary | ICD-10-CM

## 2018-07-11 DIAGNOSIS — Z74.09 IMPAIRED MOBILITY AND ADLS: ICD-10-CM

## 2018-07-11 DIAGNOSIS — M54.5 CHRONIC BILATERAL LOW BACK PAIN, WITH SCIATICA PRESENCE UNSPECIFIED: Primary | ICD-10-CM

## 2018-07-11 DIAGNOSIS — R53.1 WEAKNESS GENERALIZED: ICD-10-CM

## 2018-07-11 PROCEDURE — 97113 AQUATIC THERAPY/EXERCISES: CPT | Performed by: PHYSICAL THERAPIST

## 2018-07-11 NOTE — THERAPY TREATMENT NOTE
Outpatient Physical Therapy Ortho Treatment Note  [facility]     Patient Name: Flaquita Cr  : 1960  MRN: [id number]  Today's Date: 2018      Visit Date: 2018    Visit Dx:    ICD-10-CM ICD-9-CM   1. Chronic bilateral low back pain, with sciatica presence unspecified M54.5 724.2    G89.29 338.29   2. Impaired mobility and ADLs Z74.09 799.89   3. Weakness generalized R53.1 780.79   4. Posture imbalance R29.3 729.90       Patient Active Problem List   Diagnosis   • Arthritis of hand   • Reactive airway disease with acute exacerbation   • Secondary hypertension   • Chronic back pain   • Acne rosacea   • Chronic fatigue   • Overactive bladder   • Diverticulosis of large intestine without hemorrhage   • COPD (chronic obstructive pulmonary disease) (CMS/[facility])   • Asthma   • IBS (irritable bowel syndrome)   • Depression   • Current every day smoker   • TAI (obstructive sleep apnea)   • PAD (peripheral artery disease) (CMS/[facility])   • GERD (gastroesophageal reflux disease)   • AR (allergic rhinitis)   • Hyperglycemia   • Hyperlipidemia   • Vitamin D deficiency   • Osteopenia        Past Medical History:   Diagnosis Date   • Allergy     Allergies   • Arthritis     failed naproxena dn meloxicam - celebrex works well   • Asthma    • Circulation problem    • Colon polyp    • COPD (chronic obstructive pulmonary disease) (CMS/[facility])     does not use O2   • Depression    • Emphysema of lung (CMS/[facility])    • Fatigue    • Gastritis    • Hyperlipidemia    • Hypertension     just started, no meds   • IBS (irritable bowel syndrome)    • OAB (overactive bladder)    • Peripheral arterial disease (CMS/[facility])    • Right leg pain    • Sleep apnea         Past Surgical History:   Procedure Laterality Date   • AORTA FEMORAL BYPASS     • COLONOSCOPY N/A 2017    NBIH, diverticulosis, four 5 to 6 mm polyps (one tubular adenoma)   • HYSTERECTOMY      endometriosis   • LEG SURGERY Bilateral     for PAD   • ROOT  CANAL      R upper jaw with rootcanal                             PT Assessment/Plan     Row Name 07/11/18 1015          PT Assessment    Assessment Comments First session since initial evaluation. Skilled therapist present for instruction of proper technique with all exercises. Frequent cramping during session promopted discussion of proper hydration in order to decrease spasms/cramping. Initiatedbasic aqua program, will progress as appropriate.  -CK       User Key  (r) = Recorded By, (t) = Taken By, (c) = Cosigned By    Initials Name Provider Type    CK Onel Marin, PT Physical Therapist                    Exercises     Row Name 07/11/18 1000             Subjective Comments    Subjective Comments I have trouble with extended standing or walking. If I walk [employer] i am done for the day.  My left toe is numb and I have a lot of cramps on the left side of my abdomen.  -CK         Subjective Pain    Able to rate subjective pain? yes  -CK      Pre-Treatment Pain Level 5  -CK      Post-Treatment Pain Level 4  -CK         Aquatics    Aquatics performed? Yes  -CK         Aquatics LE    Water Walk forward;side;backward   x 3 laps w TA  -CK      Stretch 1 B calf stretch 2 x 30  -CK      Stretch 2 B piriformis stretch 2 x 30  -CK      Stretch 3 B HS/adductor stretch w SN x 10  -CK      Stretch Other 1 L lat stretch 2 x 20 sec  -CK      Stretch Other 2 DKTC on wall 5 x 15 sec  -CK      Abdominals noodle   LN x 15  -CK      Clams suspended on noodle x 20  -CK      Hip Abd/Add B 15x  -CK      March in Place walking x 2 laps  -CK      Uni-Squat sit to stands from bench x 10 *gluteal squeeze at top  -CK      Bicycle seated at bench x 30, suspended x 20  -CK      Flutter/Scissor -/15  -CK         Exercise 1    Exercise Name 1 hot tub x 5 min independently after completion of session to decrease soft tissue tension   -CK        User Key  (r) = Recorded By, (t) = Taken By, (c) = Cosigned By    Initials Name Provider Type    CK  Onel Marin PT Physical Therapist                               PT OP Goals     Row Name 07/11/18 1000          Long Term Goals    LTG 1 Reduce pain to 3/10 with water exercise and more centralized (from lateral hips).   -CK     LTG 1 Progress Ongoing  -CK     LTG 1 Progress Comments 4-5/10 on average per her report  -CK     LTG 2 Pt to sit 5 min without shifting due to increased pain (on eval shifted every few minutes, sits on leg in chair each side.)   -CK     LTG 2 Progress Progressing  -CK     LTG 3 5x sit to stand from 26.28 seconds with hands to 20 seconds no hands. (use hands on eval and was short of air by end)   -CK     LTG 3 Progress Progressing  -CK     LTG 4 Modified Oswestry perceived disability from 52% on eval to 42% on eval.   -CK     LTG 4 Progress Ongoing  -CK     LTG 5 Pt independent with self management of condition with water exercise.  Pt plans membership to [email or web address] fitness pool and also to use inversion table at home.    -CK     LTG 5 Progress Ongoing  -CK     LTG 6 patient will be able to walk grocery store or [employer] without seated rest break and minimal increase in pain level for improved community mobility  -CK     LTG 6 Progress Ongoing  -CK       User Key  (r) = Recorded By, (t) = Taken By, (c) = Cosigned By    Initials Name Provider Type    CK Onel Marin PT Physical Therapist                         Time Calculation:   Start Time: 0945  Stop Time: 1030  Time Calculation (min): 45 min  Total Timed Code Minutes- PT: 45 minute(s)  Therapy Suggested Charges     Code   Minutes Charges    None           Therapy Charges for Today     Code Description Service Date Service Provider Modifiers Qty    [id number]  PT AQUATIC THERAPY EA 15 MIN 7/11/2018 Onel Marin, PT GP 3                    Onel Marin PT  7/11/2018

## 2018-07-16 RX ORDER — ALBUTEROL SULFATE 90 UG/1
2 AEROSOL, METERED RESPIRATORY (INHALATION) EVERY 4 HOURS PRN
Qty: 18 G | Refills: 1 | Status: SHIPPED | OUTPATIENT
Start: 2018-07-16 | End: 2020-03-24

## 2018-07-16 RX ORDER — OMEPRAZOLE 40 MG/1
40 CAPSULE, DELAYED RELEASE ORAL DAILY
Qty: 30 CAPSULE | Refills: 5 | Status: SHIPPED | OUTPATIENT
Start: 2018-07-16 | End: 2019-02-12 | Stop reason: SDUPTHER

## 2018-07-16 RX ORDER — FLUNISOLIDE 0.25 MG/ML
2 SOLUTION NASAL EVERY 12 HOURS
Qty: 3 BOTTLE | Refills: 3 | Status: SHIPPED | OUTPATIENT
Start: 2018-07-16 | End: 2020-03-24

## 2018-07-23 ENCOUNTER — HOSPITAL ENCOUNTER (OUTPATIENT)
Dept: PHYSICAL THERAPY | Facility: HOSPITAL | Age: 58
Setting detail: THERAPIES SERIES
Discharge: HOME | End: 2018-07-23

## 2018-07-23 DIAGNOSIS — M54.5 CHRONIC BILATERAL LOW BACK PAIN, WITH SCIATICA PRESENCE UNSPECIFIED: Primary | ICD-10-CM

## 2018-07-23 DIAGNOSIS — Z78.9 IMPAIRED MOBILITY AND ADLS: ICD-10-CM

## 2018-07-23 DIAGNOSIS — G89.29 CHRONIC BILATERAL LOW BACK PAIN, WITH SCIATICA PRESENCE UNSPECIFIED: Primary | ICD-10-CM

## 2018-07-23 DIAGNOSIS — R53.1 WEAKNESS GENERALIZED: ICD-10-CM

## 2018-07-23 DIAGNOSIS — Z74.09 IMPAIRED MOBILITY AND ADLS: ICD-10-CM

## 2018-07-23 DIAGNOSIS — R29.3 POSTURE IMBALANCE: ICD-10-CM

## 2018-07-23 PROCEDURE — 97113 AQUATIC THERAPY/EXERCISES: CPT | Performed by: PHYSICAL THERAPIST

## 2018-07-23 NOTE — THERAPY TREATMENT NOTE
Outpatient Physical Therapy Ortho Treatment Note  [facility]     Patient Name: Flaquita Cr  : 1960  MRN: [id number]  Today's Date: 2018      Visit Date: 2018    Visit Dx:    ICD-10-CM ICD-9-CM   1. Chronic bilateral low back pain, with sciatica presence unspecified M54.5 724.2    G89.29 338.29   2. Impaired mobility and ADLs Z74.09 799.89   3. Weakness generalized R53.1 780.79   4. Posture imbalance R29.3 729.90       Patient Active Problem List   Diagnosis   • Arthritis of hand   • Reactive airway disease with acute exacerbation   • Secondary hypertension   • Chronic back pain   • Acne rosacea   • Chronic fatigue   • Overactive bladder   • Diverticulosis of large intestine without hemorrhage   • COPD (chronic obstructive pulmonary disease) (CMS/[facility])   • Asthma   • IBS (irritable bowel syndrome)   • Depression   • Current every day smoker   • TAI (obstructive sleep apnea)   • PAD (peripheral artery disease) (CMS/[facility])   • GERD (gastroesophageal reflux disease)   • AR (allergic rhinitis)   • Hyperglycemia   • Hyperlipidemia   • Vitamin D deficiency   • Osteopenia        Past Medical History:   Diagnosis Date   • Allergy     Allergies   • Arthritis     failed naproxena dn meloxicam - celebrex works well   • Asthma    • Circulation problem    • Colon polyp    • COPD (chronic obstructive pulmonary disease) (CMS/[facility])     does not use O2   • Depression    • Emphysema of lung (CMS/[facility])    • Fatigue    • Gastritis    • Hyperlipidemia    • Hypertension     just started, no meds   • IBS (irritable bowel syndrome)    • OAB (overactive bladder)    • Peripheral arterial disease (CMS/[facility])    • Right leg pain    • Sleep apnea         Past Surgical History:   Procedure Laterality Date   • AORTA FEMORAL BYPASS     • COLONOSCOPY N/A 2017    NBIH, diverticulosis, four 5 to 6 mm polyps (one tubular adenoma)   • HYSTERECTOMY      endometriosis   • LEG SURGERY Bilateral     for PAD   • ROOT  CANAL      R upper jaw with rootcanal                             PT Assessment/Plan     Row Name 07/23/18 1019          PT Assessment    Assessment Comments One week hiatus from skilled aquatic therapy secondary to pool maintenance.Continued with basic program as patient reported increased soreness/pain for 24-48 hours after initial session. Educated her that potential for soreness/discomfort is normal when starting a new exercise regimine.   -CK       User Key  (r) = Recorded By, (t) = Taken By, (c) = Cosigned By    Initials Name Provider Type    CK Onel Marin, PT Physical Therapist                    Exercises     Row Name 07/23/18 1000             Subjective Comments    Subjective Comments I rested all weekend. I awoke with pain this AM which is odd for me. I had a lot of pain after the first session.   -CK         Subjective Pain    Able to rate subjective pain? yes  -CK      Pre-Treatment Pain Level 5  -CK      Post-Treatment Pain Level 4  -CK         Aquatics    Aquatics performed? Yes  -CK         Aquatics LE    Water Walk forward;side;backward   x 3 laps w TA  -CK      Stretch 1 B calf stretch 3 x 20  -CK      Stretch 2 B piriformis stretch 3 x 20  -CK      Stretch 3 B HS/adductor stretch w SN x 12  -CK      Stretch Other 1 lat stretch on wall 4 x15  -CK      Stretch Other 2 DKTC on wall 5 x 15 sec  -CK      Vertical Traction x 3 min  -CK      Abdominals noodle   LN x 15  -CK      Clams suspended on noodle x 20  -CK      Hip Abd/Add B 15x  -CK      March in Place walking x 2 laps  -CK      Uni-Squat sit to stands from bench x 10 *gluteal squeeze at top  -CK      Uni-Clock susp. tuck ups x 12  -CK      Bicycle seated at bench x 30, suspended x 30  -CK      Flutter/Scissor -/15  -CK         Exercise 1    Exercise Name 1 hot tub x 5 min independently after completion of session to decrease soft tissue tension   -CK        User Key  (r) = Recorded By, (t) = Taken By, (c) = Cosigned By    Initials Name  Provider Type    ELMA Marin PT Physical Therapist                               PT OP Goals     Row Name 07/23/18 1000          Long Term Goals    LTG 1 Reduce pain to 3/10 with water exercise and more centralized (from lateral hips).   -CK     LTG 1 Progress Ongoing  -CK     LTG 1 Progress Comments 5/10 today  -CK     LTG 2 Pt to sit 5 min without shifting due to increased pain (on eval shifted every few minutes, sits on leg in chair each side.)   -CK     LTG 2 Progress Partially Met  -CK     LTG 3 5x sit to stand from 26.28 seconds with hands to 20 seconds no hands. (use hands on eval and was short of air by end)   -CK     LTG 3 Progress Progressing  -CK     LTG 4 Modified Oswestry perceived disability from 52% on eval to 42% on eval.   -CK     LTG 4 Progress Ongoing  -CK     LTG 5 Pt independent with self management of condition with water exercise.  Pt plans membership to [employer] pool and also to use inversion table at home.    -CK     LTG 5 Progress Ongoing  -CK     LTG 5 Progress Comments still requires cueing at this time  -CK     LTG 6 patient will be able to walk grocery store or [employer] without seated rest break and minimal increase in pain level for improved community mobility  -CK     LTG 6 Progress Ongoing  -CK       User Key  (r) = Recorded By, (t) = Taken By, (c) = Cosigned By    Initials Name Provider Type    CK Onel Marin PT Physical Therapist                         Time Calculation:   Start Time: 0945  Stop Time: 1030  Time Calculation (min): 45 min  Total Timed Code Minutes- PT: 45 minute(s)  Therapy Suggested Charges     Code   Minutes Charges    None           Therapy Charges for Today     Code Description Service Date Service Provider Modifiers Qty    39637175653 HC PT AQUATIC THERAPY EA 15 MIN 7/23/2018 Onel Marin PT GP 3                    Onel Marin PT  7/23/2018

## 2018-07-25 ENCOUNTER — HOSPITAL ENCOUNTER (OUTPATIENT)
Dept: PHYSICAL THERAPY | Facility: HOSPITAL | Age: 58
Setting detail: THERAPIES SERIES
Discharge: HOME | End: 2018-07-25

## 2018-07-25 DIAGNOSIS — Z78.9 IMPAIRED MOBILITY AND ADLS: ICD-10-CM

## 2018-07-25 DIAGNOSIS — M54.5 CHRONIC BILATERAL LOW BACK PAIN, WITH SCIATICA PRESENCE UNSPECIFIED: Primary | ICD-10-CM

## 2018-07-25 DIAGNOSIS — G89.29 CHRONIC BILATERAL LOW BACK PAIN, WITH SCIATICA PRESENCE UNSPECIFIED: Primary | ICD-10-CM

## 2018-07-25 DIAGNOSIS — Z74.09 IMPAIRED MOBILITY AND ADLS: ICD-10-CM

## 2018-07-25 DIAGNOSIS — R53.1 WEAKNESS GENERALIZED: ICD-10-CM

## 2018-07-25 DIAGNOSIS — R29.3 POSTURE IMBALANCE: ICD-10-CM

## 2018-07-25 PROCEDURE — 97113 AQUATIC THERAPY/EXERCISES: CPT | Performed by: PHYSICAL THERAPIST

## 2018-07-25 NOTE — THERAPY PROGRESS REPORT/RE-CERT
Outpatient Physical Therapy Ortho Progress Note  [facility]     Patient Name: Flaquita Cr  : 1960  MRN: [id number]  Today's Date: 2018      Visit Date: 2018    Patient Active Problem List   Diagnosis   • Arthritis of hand   • Reactive airway disease with acute exacerbation   • Secondary hypertension   • Chronic back pain   • Acne rosacea   • Chronic fatigue   • Overactive bladder   • Diverticulosis of large intestine without hemorrhage   • COPD (chronic obstructive pulmonary disease) (CMS/[facility])   • Asthma   • IBS (irritable bowel syndrome)   • Depression   • Current every day smoker   • TAI (obstructive sleep apnea)   • PAD (peripheral artery disease) (CMS/[facility])   • GERD (gastroesophageal reflux disease)   • AR (allergic rhinitis)   • Hyperglycemia   • Hyperlipidemia   • Vitamin D deficiency   • Osteopenia        Past Medical History:   Diagnosis Date   • Allergy     Allergies   • Arthritis     failed naproxena dn meloxicam - celebrex works well   • Asthma    • Circulation problem    • Colon polyp    • COPD (chronic obstructive pulmonary disease) (CMS/[facility])     does not use O2   • Depression    • Emphysema of lung (CMS/[facility])    • Fatigue    • Gastritis    • Hyperlipidemia    • Hypertension     just started, no meds   • IBS (irritable bowel syndrome)    • OAB (overactive bladder)    • PAD (peripheral artery disease) (CMS/[facility])    • Peripheral arterial disease (CMS/[facility])    • Right leg pain    • Sleep apnea         Past Surgical History:   Procedure Laterality Date   • AORTA FEMORAL BYPASS     • COLONOSCOPY N/A 2017    NBIH, diverticulosis, four 5 to 6 mm polyps (one tubular adenoma)   • HYSTERECTOMY      endometriosis   • LEG SURGERY Bilateral     for PAD   • ROOT CANAL      R upper jaw with rootcanal       Visit Dx:     ICD-10-CM ICD-9-CM   1. Chronic bilateral low back pain, with sciatica presence unspecified M54.5 724.2    G89.29 338.29   2. Impaired mobility and ADLs Z74.09  799.89   3. Weakness generalized R53.1 780.79   4. Posture imbalance R29.3 729.90                                       PT OP Goals     Row Name 07/25/18 1000          Long Term Goals    LTG 1 Reduce pain to 3/10 with water exercise and more centralized (from lateral hips).   -CK     LTG 1 Progress Ongoing  -CK     LTG 1 Progress Comments 4-5/10 on average to date  -CK     LTG 2 Pt to sit 5 min without shifting due to increased pain (on eval shifted every few minutes, sits on leg in chair each side.)   -CK     LTG 2 Progress Partially Met  -CK     LTG 2 Progress Comments some days are “ better than others”  -CK     LTG 3 5x sit to stand from 26.28 seconds with hands to 20 seconds no hands. (use hands on eval and was short of air by end)   -CK     LTG 3 Progress Progressing  -CK     LTG 3 Progress Comments 23 seconds with reassessment  -CK     LTG 4 Modified Oswestry perceived disability from 52% on eval to 42% on eval.   -CK     LTG 4 Progress Ongoing  -CK     LTG 5 Pt independent with self management of condition with water exercise.  Pt plans membership to [employer] and also to use inversion table at home.    -CK     LTG 5 Progress Ongoing  -CK     LTG 5 Progress Comments havent tried it yet. own it but scared to use it.   -CK     LTG 6 patient will be able to walk grocery store or [employer] without seated rest break and minimal increase in pain level for improved community mobility  -CK     LTG 6 Progress Ongoing  -CK     LTG 6 Progress Comments with use of cart to lean on, otherwise requires seated rest breaks. extended standing/walking > 15 min is a struggle  -CK       User Key  (r) = Recorded By, (t) = Taken By, (c) = Cosigned By    Initials Name Provider Type    CK Onel Marin, PT Physical Therapist                PT Assessment/Plan     Row Name 07/25/18 1007          PT Assessment    Assessment Comments Ms. Guthrie has been seen for 5 skilled aquatic therapy sessions since initiating therapy for  chronic low back pain, B foot tingling (L>R), and B hip/buttock pain. She reports feeling “encouraged” with aquatic therapy as she is able to work on her mobility and weakness without increased pain like traditional land therapy has caused her in the past. She has a referral to neurosurgeon for further work up next month. she demonstrates minimal functional or objective change to date due to lack of sessions. She remains appropriate for instruction/education of aquatic program with progression as to;erated by symptoms. Lack of therapy sessions due to schedule unavailability and annual maintenance shut down.   -CK       User Key  (r) = Recorded By, (t) = Taken By, (c) = Cosigned By    Initials Name Provider Type    CK Onel Marin, PT Physical Therapist                  Exercises     Row Name 07/25/18 1000             Subjective Comments    Subjective Comments I am having a bad day. My toe was really bad last night. I see Dr. JENKINS on the 8th.  -CK         Subjective Pain    Able to rate subjective pain? yes  -CK      Pre-Treatment Pain Level 5  -CK      Post-Treatment Pain Level 4  -CK         Aquatics    Aquatics performed? Yes  -CK         Aquatics LE    Water Walk forward;side;backward   x 3 laps w TA  -CK      Stretch 1 B calf stretch 3 x 20  -CK      Stretch 2 B piriformis stretch 3 x 20  -CK      Stretch 3 B HS/adductor stretch w LN x 10  -CK      Stretch Other 1 lat stretch on wall 4 x15  -CK      Stretch Other 2 DKTC on wall 5 x 15 sec  -CK      Vertical Traction x 5 min  -CK      Abdominals noodle   LN x 10, rectus and obliques ea  -CK      Clams suspended on noodle x 20  -CK      Hip Abd/Add B 15x  -CK      March in Place walking x 2 laps  -CK      Toe/Heel Raises tip toe/tandem walk x 2 laps  -CK      Uni-Squat sit to stands from bench x 10 *gluteal squeeze at top  -CK      Uni-Clock susp. tuck ups x 12  -CK      Step Ups B hip circles cw/ccw 10/10  -CK      Bicycle suspended x2 min  -CK      Flutter/Scissor  --  -CK         Exercise 1    Exercise Name 1 hot tub x 5 min independently after completion of session to decrease soft tissue tension   -CK        User Key  (r) = Recorded By, (t) = Taken By, (c) = Cosigned By    Initials Name Provider Type    CK Onel Marin, PT Physical Therapist                        Outcome Measure Options: 5x Sit to Stand, Modifed Owestry  5 Times Sit to Stand  5 Times Sit to Stand (seconds): 23 seconds  Modified Oswestry  Modified Oswestry Score/Comments: 60%      Time Calculation:     Therapy Suggested Charges     Code   Minutes Charges    None             Start Time: 0945  Stop Time: 1030  Time Calculation (min): 45 min  Total Timed Code Minutes- PT: 45 minute(s)     Therapy Charges for Today     Code Description Service Date Service Provider Modifiers Qty    98941399702 HC PT AQUATIC THERAPY EA 15 MIN 7/25/2018 Onel Marin, PT GP 3          PT G-Codes  Outcome Measure Options: 5x Sit to Stand, Modifed Owestry         Onel Marin, PT  7/25/2018

## 2018-07-30 ENCOUNTER — HOSPITAL ENCOUNTER (OUTPATIENT)
Dept: PHYSICAL THERAPY | Facility: HOSPITAL | Age: 58
Setting detail: THERAPIES SERIES
Discharge: HOME | End: 2018-07-30

## 2018-07-30 DIAGNOSIS — R29.3 POSTURE IMBALANCE: ICD-10-CM

## 2018-07-30 DIAGNOSIS — G89.29 CHRONIC BILATERAL LOW BACK PAIN, WITH SCIATICA PRESENCE UNSPECIFIED: Primary | ICD-10-CM

## 2018-07-30 DIAGNOSIS — R53.1 WEAKNESS GENERALIZED: ICD-10-CM

## 2018-07-30 DIAGNOSIS — Z74.09 IMPAIRED MOBILITY AND ADLS: ICD-10-CM

## 2018-07-30 DIAGNOSIS — M54.5 CHRONIC BILATERAL LOW BACK PAIN, WITH SCIATICA PRESENCE UNSPECIFIED: Primary | ICD-10-CM

## 2018-07-30 DIAGNOSIS — Z78.9 IMPAIRED MOBILITY AND ADLS: ICD-10-CM

## 2018-07-30 PROCEDURE — 97113 AQUATIC THERAPY/EXERCISES: CPT | Performed by: PHYSICAL THERAPIST

## 2018-07-30 NOTE — THERAPY TREATMENT NOTE
Outpatient Physical Therapy Ortho Treatment Note  [facility]     Patient Name: Flaquita Cr  : 1960  MRN: [id number]  Today's Date: 2018      Visit Date: 2018    Visit Dx:    ICD-10-CM ICD-9-CM   1. Chronic bilateral low back pain, with sciatica presence unspecified M54.5 724.2    G89.29 338.29   2. Impaired mobility and ADLs Z74.09 799.89   3. Weakness generalized R53.1 780.79   4. Posture imbalance R29.3 729.90       Patient Active Problem List   Diagnosis   • Arthritis of hand   • Reactive airway disease with acute exacerbation   • Secondary hypertension   • Chronic back pain   • Acne rosacea   • Chronic fatigue   • Overactive bladder   • Diverticulosis of large intestine without hemorrhage   • COPD (chronic obstructive pulmonary disease) (CMS/[facility])   • Asthma   • IBS (irritable bowel syndrome)   • Depression   • Current every day smoker   • TAI (obstructive sleep apnea)   • PAD (peripheral artery disease) (CMS/[facility])   • GERD (gastroesophageal reflux disease)   • AR (allergic rhinitis)   • Hyperglycemia   • Hyperlipidemia   • Vitamin D deficiency   • Osteopenia        Past Medical History:   Diagnosis Date   • Allergy     Allergies   • Arthritis     failed naproxena dn meloxicam - celebrex works well   • Asthma    • Circulation problem    • Colon polyp    • COPD (chronic obstructive pulmonary disease) (CMS/[facility])     does not use O2   • Depression    • Emphysema of lung (CMS/[facility])    • Fatigue    • Gastritis    • Hyperlipidemia    • Hypertension     just started, no meds   • IBS (irritable bowel syndrome)    • OAB (overactive bladder)    • PAD (peripheral artery disease) (CMS/[facility])    • Peripheral arterial disease (CMS/[facility])    • Right leg pain    • Sleep apnea         Past Surgical History:   Procedure Laterality Date   • AORTA FEMORAL BYPASS     • COLONOSCOPY N/A 2017    NBIH, diverticulosis, four 5 to 6 mm polyps (one tubular adenoma)   • HYSTERECTOMY      endometriosis   •  LEG SURGERY Bilateral     for PAD   • ROOT CANAL      R upper jaw with rootcanal                             PT Assessment/Plan     Row Name 07/30/18 1021          PT Assessment    Assessment Comments Continued with current program as patient is reporting increase discomfort for 24 hours after session. suggested she use ice on low back and put herself in lumbar decompression position for pain relief. She verbalized understanding. Added quad/hip flexor stretch.  -CK       User Key  (r) = Recorded By, (t) = Taken By, (c) = Cosigned By    Initials Name Provider Type    CK Onel Marin, PT Physical Therapist                    Exercises     Row Name 07/30/18 0900             Subjective Comments    Subjective Comments I forgot to do the inversion thing. I hust a lot after the last session but I forgot to put ice on my back like you suggested. I just get busy. I was in a rush to get here today. Honestly, I dont do much at home. I am pretty sedentary.  -CK         Subjective Pain    Able to rate subjective pain? yes  -CK      Pre-Treatment Pain Level 4  -CK         Aquatics    Aquatics performed? Yes  -CK         Aquatics LE    Water Walk forward;side;backward   x 3 laps w TA  -CK      Stretch 1 B calf stretch 2x 30  -CK      Stretch 2 B piriformis stretch 2 x 30  -CK      Stretch 3 B HS/adductor stretch w LN x 10  -CK      Stretch Other 1 lat stretch on wall 4 x15  -CK      Stretch Other 2 DKTC on wall 5 x 15 sec  -CK      Vertical Traction x 5 min  -CK      Abdominals noodle   LN x 10, rectus and obliques ea  -CK      Clams suspended on noodle x 20  -CK      Hip Abd/Add B 15x  -CK      March in Place walking x 2 laps  -CK      Mini Squat leg press x15 B ring  -CK      Toe/Heel Raises tip toe/tandem walk x 2 laps  -CK      Uni-Squat sit to stands from bench x 15 *gluteal squeeze at top  -CK      Uni-Clock susp. tuck ups x 12  -CK      Step Ups B hip circles cw/ccw 10/10  -CK      Bicycle suspended x2 min  seated x 2o for  warm up  -CK      Flutter/Scissor -/20  -CK         Exercise 1    Exercise Name 1 hot tub x 5 min independently after completion of session to decrease soft tissue tension   -CK         Exercise 2    Exercise Name 2 B quad stretch  -CK      Sets 2 2  -CK      Time 2 30 sec  -CK      Additional Comments small noodle  -CK        User Key  (r) = Recorded By, (t) = Taken By, (c) = Cosigned By    Initials Name Provider Type    CK Onel Marin, PT Physical Therapist                                            Time Calculation:   Start Time: 0945  Stop Time: 1030  Time Calculation (min): 45 min  Total Timed Code Minutes- PT: 45 minute(s)  Therapy Suggested Charges     Code   Minutes Charges    None           Therapy Charges for Today     Code Description Service Date Service Provider Modifiers Qty    08800591793 HC PT AQUATIC THERAPY EA 15 MIN 7/30/2018 Onel Marin, PT GP 3                    Onel Marin PT  7/30/2018

## 2018-08-01 ENCOUNTER — HOSPITAL ENCOUNTER (OUTPATIENT)
Dept: PHYSICAL THERAPY | Facility: HOSPITAL | Age: 58
Setting detail: THERAPIES SERIES
End: 2018-08-01

## 2018-08-01 NOTE — PROGRESS NOTES
Subjective   Patient ID: Flaquita Cr is a 58 y.o. female is being seen for consultation today at the request of Seymour Bright MD for lower back pain that radiates into bilateral legs. She complains of losing feeling in bilateral feet left worse than right.    Ms. Cr is being seen today for a neurosurgical consultation at the request of Dr. Seymour Bright for cervical, low back and bilateral arm/hand, leg/foot pain.  The pain has been present for a number of years. It has worsened over the last 6 months. Notes from today's visit will be forwarded upon completion.      Back Pain   This is a chronic problem. The current episode started more than 1 month ago (6 months). The problem occurs daily. The problem is unchanged. The pain is present in the lumbar spine. The quality of the pain is described as aching. The pain radiates to the right foot and left foot. The pain is at a severity of 5/10. The pain is moderate. Associated symptoms include headaches, leg pain, numbness, tingling and weakness. Pertinent negatives include no bladder incontinence, bowel incontinence or chest pain. Treatments tried: physical therapy, inversion table, pain management.   Neck Pain    This is a recurrent problem. The current episode started more than 1 year ago (worse over the last 6 months). The problem occurs daily. The problem has been gradually worsening. The pain is associated with nothing. The pain is present in the occipital region and left side. The quality of the pain is described as aching. The pain is at a severity of 8/10. The pain is severe. The symptoms are aggravated by position and twisting. Associated symptoms include headaches, leg pain, numbness, tingling and weakness. Pertinent negatives include no chest pain. Treatments tried: inversion table; water therapy. The treatment provided no relief.       The following portions of the patient's history were reviewed and updated as appropriate: allergies, current medications,  past family history, past medical history, past social history, past surgical history and problem list.    Review of Systems   Respiratory: Negative for chest tightness and shortness of breath.    Cardiovascular: Negative for chest pain.   Gastrointestinal: Negative for bowel incontinence.   Genitourinary: Positive for enuresis and urgency. Negative for bladder incontinence.   Musculoskeletal: Positive for arthralgias, back pain, myalgias, neck pain and neck stiffness.   Allergic/Immunologic: Positive for environmental allergies.   Neurological: Positive for tingling, weakness, numbness and headaches.   All other systems reviewed and are negative.      Objective   Physical Exam   Constitutional: She is oriented to person, place, and time. She appears well-developed and well-nourished. She is cooperative. No distress.   HENT:   Head: Normocephalic and atraumatic.   Eyes: Conjunctivae are normal.   Neck: Neck supple.   Limited ROM with turning head to the left.    Cardiovascular: Normal rate.    Pulmonary/Chest: Effort normal. No respiratory distress.   Abdominal: Soft. She exhibits no distension. There is no tenderness.   Musculoskeletal: Normal range of motion. She exhibits tenderness (With palpation in the posterior cervical region particulalry on the left. ). She exhibits no edema.   Neurological: She is alert and oriented to person, place, and time. She has normal strength. She displays abnormal reflex. No sensory deficit. She exhibits normal muscle tone. Coordination normal. GCS eye subscore is 4. GCS verbal subscore is 5. GCS motor subscore is 6.   No motor deficits in UE or LE. Sensation is equal and intact. DTR's normal except for bilateral 3+ knee jerks. Negative Hoang's bilaterally; negative clonus. + Spurling's on the left. + SLR on the left at 30 degrees.    Skin: Skin is warm and dry. No rash noted. She is not diaphoretic.   Pulses palpable in both UE and LE's. All four extremities with normal color  and temperature.    Psychiatric: She has a normal mood and affect. Thought content normal.   Vitals reviewed.    Neurologic Exam     Mental Status   Oriented to person, place, and time.     Motor Exam     Strength   Strength 5/5 throughout.       Assessment/Plan   Independent Review of Radiographic Studies:        I have independently reviewed previous MRI images of the thoracic and lumbar spine both performed August 26, 2015 in the office.  The thoracic MRI showed no signs of fracture, disc herniation or stenosis.  The lumbar MRI revealed degenerative changes with mild facet arthropathy at L4-5.  There is evidence of an annular tear at L5-S1 also with mild facet arthropathy.  No evidence of severe disc herniation, canal stenosis or fracture.    Medical Decision Making:      Ms. Cr was seen today for the above complaints. She has a history of aortobifemoral bypass with Dr Tapia for peripheral arterial disease many years ago. She saw Dr. Bright recently who re-evalauted her circulation and found no issues.     She c/o neck pain, trapezius, arm/hand pain as well as low back and L buttock hip pain and lateral thigh pain. These symptoms have been going on for several years but has worsened over the last 6 months. The symptoms are not present at night. She has tried aquatic therapy, inversion table with no relief in symptoms.    I have ordered an MRI of the cervical and lumbar spine. Ms. Cr will return to the office thereafter for re-evaluation.        Flaquita was seen today for back pain.    Diagnoses and all orders for this visit:    Cervical spine pain  -     MRI Cervical Spine Without Contrast; Future  -     MRI Lumbar Spine Without Contrast; Future    Lumbar spine pain  -     MRI Cervical Spine Without Contrast; Future  -     MRI Lumbar Spine Without Contrast; Future    Lumbar radiculitis  -     MRI Cervical Spine Without Contrast; Future  -     MRI Lumbar Spine Without Contrast; Future    Cervical  radiculitis  -     MRI Cervical Spine Without Contrast; Future  -     MRI Lumbar Spine Without Contrast; Future      Return for after radiographic imaging.

## 2018-08-06 ENCOUNTER — HOSPITAL ENCOUNTER (OUTPATIENT)
Dept: PHYSICAL THERAPY | Facility: HOSPITAL | Age: 58
Setting detail: THERAPIES SERIES
Discharge: HOME | End: 2018-08-06

## 2018-08-06 DIAGNOSIS — M54.5 CHRONIC BILATERAL LOW BACK PAIN, WITH SCIATICA PRESENCE UNSPECIFIED: Primary | ICD-10-CM

## 2018-08-06 DIAGNOSIS — Z78.9 IMPAIRED MOBILITY AND ADLS: ICD-10-CM

## 2018-08-06 DIAGNOSIS — G89.29 CHRONIC BILATERAL LOW BACK PAIN, WITH SCIATICA PRESENCE UNSPECIFIED: Primary | ICD-10-CM

## 2018-08-06 DIAGNOSIS — R29.3 POSTURE IMBALANCE: ICD-10-CM

## 2018-08-06 DIAGNOSIS — R53.1 WEAKNESS GENERALIZED: ICD-10-CM

## 2018-08-06 DIAGNOSIS — Z74.09 IMPAIRED MOBILITY AND ADLS: ICD-10-CM

## 2018-08-06 PROCEDURE — 97113 AQUATIC THERAPY/EXERCISES: CPT | Performed by: PHYSICAL THERAPIST

## 2018-08-06 NOTE — THERAPY TREATMENT NOTE
Outpatient Physical Therapy Ortho Treatment Note  [facility]     Patient Name: Flaquita Cr  : 1960  MRN: [id number]  Today's Date: 2018      Visit Date: 2018    Visit Dx:    ICD-10-CM ICD-9-CM   1. Chronic bilateral low back pain, with sciatica presence unspecified M54.5 724.2    G89.29 338.29   2. Impaired mobility and ADLs Z74.09 799.89   3. Weakness generalized R53.1 780.79   4. Posture imbalance R29.3 729.90       Patient Active Problem List   Diagnosis   • Arthritis of hand   • Reactive airway disease with acute exacerbation   • Secondary hypertension   • Chronic back pain   • Acne rosacea   • Chronic fatigue   • Overactive bladder   • Diverticulosis of large intestine without hemorrhage   • COPD (chronic obstructive pulmonary disease) (CMS/[facility])   • Asthma   • IBS (irritable bowel syndrome)   • Depression   • Current every day smoker   • TAI (obstructive sleep apnea)   • PAD (peripheral artery disease) (CMS/[facility])   • GERD (gastroesophageal reflux disease)   • AR (allergic rhinitis)   • Hyperglycemia   • Hyperlipidemia   • Vitamin D deficiency   • Osteopenia        Past Medical History:   Diagnosis Date   • Allergy     Allergies   • Arthritis     failed naproxena dn meloxicam - celebrex works well   • Asthma    • Circulation problem    • Colon polyp    • COPD (chronic obstructive pulmonary disease) (CMS/[facility])     does not use O2   • Depression    • Emphysema of lung (CMS/[facility])    • Fatigue    • Gastritis    • Hyperlipidemia    • Hypertension     just started, no meds   • IBS (irritable bowel syndrome)    • OAB (overactive bladder)    • PAD (peripheral artery disease) (CMS/[facility])    • Peripheral arterial disease (CMS/[facility])    • Right leg pain    • Sleep apnea         Past Surgical History:   Procedure Laterality Date   • AORTA FEMORAL BYPASS     • COLONOSCOPY N/A 2017    NBIH, diverticulosis, four 5 to 6 mm polyps (one tubular adenoma)   • HYSTERECTOMY      endometriosis   •  LEG SURGERY Bilateral     for PAD   • ROOT CANAL      R upper jaw with rootcanal                             PT Assessment/Plan     Row Name 08/06/18 1030          PT Assessment    Assessment Comments Patient reporting significant flare after last Monday session causing increased pain for 2-3 days. Held on wednesday session. Patient requested trial of 1x/week for therapy as she is “not used to activity.” She reports plans to meet with neurosurgeon to address lumbar/BLE complaintson [facility]. (Dr. JENKINS). She also re-ortedat end of session a history of B hip bursitis which sounds similiar to signs and symptoms she is currently reporting. Will decrease frequency. Removed certain exercises to decrease potential for hip flare.  -CK       User Key  (r) = Recorded By, (t) = Taken By, (c) = Cosigned By    Initials Name Provider Type    CK Onel Marin, PT Physical Therapist                    Exercises     Row Name 08/06/18 1000             Subjective Comments    Subjective Comments I rested after Monday’s session. I was hurting a lot. Pain went up to a 7-8 after that session. My feet were numb/tingling (L>R). It is better after laying in bed for a couple of days.   -CK         Subjective Pain    Able to rate subjective pain? yes  -CK      Pre-Treatment Pain Level 4  -CK      Post-Treatment Pain Level 5  -CK         Aquatics LE    Water Walk forward;side;backward   x 3 with TA  -CK      Stretch 1 B calf stretch 2x 30  -CK      Stretch 2 B piriformis stretch 2 x 30  -CK      Stretch 3 B HS/adductor stretch w SN x 10  -CK      Stretch Other 1 lat stretch on wall 4 x15  -CK      Stretch Other 2 DKTC on wall 5 x 15 sec  -CK      Vertical Traction 2 min  -CK      Abdominals noodle   LN x 15  -CK      Clams suspended on noodle x 20  -CK      Hip Abd/Add B 15x  -CK      Hip Flex/Ext B Ext  -CK      March in Place walking x 2 laps  -CK      Mini Squat 15x *gluteal squeeze at top  -CK      Uni-Clock susp. tuck ups x 12  -CK      Bicycle  suspended x2 min  seated x 2o for warm up  -CK         Exercise 1    Exercise Name 1 hot tub x 5 min independently after completion of session to decrease soft tissue tension   -CK         Exercise 3    Exercise Name 3 BUE work: shldr abd/add, flex/ext, IR/ER  -CK      Reps 3 15 each  -CK        User Key  (r) = Recorded By, (t) = Taken By, (c) = Cosigned By    Initials Name Provider Type    CK Onel Marin, PT Physical Therapist                                            Time Calculation:   Start Time: 0945  Stop Time: 1030  Time Calculation (min): 45 min  Total Timed Code Minutes- PT: 45 minute(s)  Therapy Suggested Charges     Code   Minutes Charges    None           Therapy Charges for Today     Code Description Service Date Service Provider Modifiers Qty    53446780377 HC PT AQUATIC THERAPY EA 15 MIN 8/6/2018 Onel Marin, PT GP 3                    Onel Marin, PT  8/6/2018

## 2018-08-08 ENCOUNTER — APPOINTMENT (OUTPATIENT)
Dept: PHYSICAL THERAPY | Facility: HOSPITAL | Age: 58
End: 2018-08-08

## 2018-08-08 ENCOUNTER — OFFICE VISIT (OUTPATIENT)
Dept: NEUROSURGERY | Facility: CLINIC | Age: 58
End: 2018-08-08

## 2018-08-08 VITALS — HEIGHT: 64 IN | WEIGHT: 201 LBS | BODY MASS INDEX: 34.31 KG/M2

## 2018-08-08 DIAGNOSIS — M54.16 LUMBAR RADICULITIS: ICD-10-CM

## 2018-08-08 DIAGNOSIS — M54.2 CERVICAL SPINE PAIN: Primary | ICD-10-CM

## 2018-08-08 DIAGNOSIS — M54.50 LUMBAR SPINE PAIN: ICD-10-CM

## 2018-08-08 DIAGNOSIS — M54.12 CERVICAL RADICULITIS: ICD-10-CM

## 2018-08-08 PROCEDURE — 99243 OFF/OP CNSLTJ NEW/EST LOW 30: CPT | Performed by: NURSE PRACTITIONER

## 2018-08-12 DIAGNOSIS — F32.A DEPRESSION, UNSPECIFIED DEPRESSION TYPE: ICD-10-CM

## 2018-08-13 ENCOUNTER — HOSPITAL ENCOUNTER (OUTPATIENT)
Dept: PHYSICAL THERAPY | Facility: HOSPITAL | Age: 58
Setting detail: THERAPIES SERIES
Discharge: HOME | End: 2018-08-13

## 2018-08-13 DIAGNOSIS — R53.1 WEAKNESS GENERALIZED: ICD-10-CM

## 2018-08-13 DIAGNOSIS — R29.3 POSTURE IMBALANCE: ICD-10-CM

## 2018-08-13 DIAGNOSIS — Z74.09 IMPAIRED MOBILITY AND ADLS: ICD-10-CM

## 2018-08-13 DIAGNOSIS — G89.29 CHRONIC BILATERAL LOW BACK PAIN, WITH SCIATICA PRESENCE UNSPECIFIED: Primary | ICD-10-CM

## 2018-08-13 DIAGNOSIS — Z78.9 IMPAIRED MOBILITY AND ADLS: ICD-10-CM

## 2018-08-13 DIAGNOSIS — M54.5 CHRONIC BILATERAL LOW BACK PAIN, WITH SCIATICA PRESENCE UNSPECIFIED: Primary | ICD-10-CM

## 2018-08-13 PROCEDURE — 97113 AQUATIC THERAPY/EXERCISES: CPT | Performed by: PHYSICAL THERAPIST

## 2018-08-13 RX ORDER — FLUOXETINE HYDROCHLORIDE 20 MG/1
CAPSULE ORAL
Qty: 90 CAPSULE | Refills: 1 | Status: SHIPPED | OUTPATIENT
Start: 2018-08-13 | End: 2018-12-27 | Stop reason: SDUPTHER

## 2018-08-13 NOTE — THERAPY TREATMENT NOTE
Outpatient Physical Therapy Ortho Treatment Note  [facility]     Patient Name: Flaquita Cr  : 1960  MRN: [id number]  Today's Date: 2018      Visit Date: 2018    Visit Dx:    ICD-10-CM ICD-9-CM   1. Chronic bilateral low back pain, with sciatica presence unspecified M54.5 724.2    G89.29 338.29   2. Impaired mobility and ADLs Z74.09 799.89   3. Posture imbalance R29.3 729.90   4. Weakness generalized R53.1 780.79       Patient Active Problem List   Diagnosis   • Arthritis of hand   • Reactive airway disease with acute exacerbation   • Secondary hypertension   • Chronic back pain   • Acne rosacea   • Chronic fatigue   • Overactive bladder   • Diverticulosis of large intestine without hemorrhage   • COPD (chronic obstructive pulmonary disease) (CMS/[facility])   • Asthma   • IBS (irritable bowel syndrome)   • Depression   • Current every day smoker   • TAI (obstructive sleep apnea)   • PAD (peripheral artery disease) (CMS/[facility])   • GERD (gastroesophageal reflux disease)   • AR (allergic rhinitis)   • Hyperglycemia   • Hyperlipidemia   • Vitamin D deficiency   • Osteopenia        Past Medical History:   Diagnosis Date   • Allergy     Allergies   • Arthritis     failed naproxena dn meloxicam - celebrex works well   • Asthma    • Circulation problem    • Colon polyp    • COPD (chronic obstructive pulmonary disease) (CMS/[facility])     does not use O2   • Depression    • Emphysema of lung (CMS/[facility])    • Fatigue    • Gastritis    • Hyperlipidemia    • Hypertension     just started, no meds   • IBS (irritable bowel syndrome)    • OAB (overactive bladder)    • PAD (peripheral artery disease) (CMS/[facility])    • Peripheral arterial disease (CMS/[facility])    • Right leg pain    • Sleep apnea         Past Surgical History:   Procedure Laterality Date   • AORTA FEMORAL BYPASS     • COLONOSCOPY N/A 2017    NBIH, diverticulosis, four 5 to 6 mm polyps (one tubular adenoma)   • HYSTERECTOMY      endometriosis   •  LEG SURGERY Bilateral     for PAD   • ROOT CANAL      R upper jaw with rootcanal                             PT Assessment/Plan     Row Name 08/13/18 1034          PT Assessment    Assessment Comments Patient reports pain for 12 hours after last session but resolved by the next morning. States she prefers the one day/week at this time with the plan/goal of working her tolerance up to 2x/week. MRI pending per neurosurgeon’s office. Tolerating basic aquatic program fairly well, will progress as symptoms allow.   -CK       User Key  (r) = Recorded By, (t) = Taken By, (c) = Cosigned By    Initials Name Provider Type    CK Onel Marin, PT Physical Therapist                    Exercises     Row Name 08/13/18 1000             Subjective Comments    Subjective Comments I saw Dr. JENKINS’s office. They want to do MRIs. I think I did better with the one day/week.  -CK         Subjective Pain    Able to rate subjective pain? yes  -CK      Pre-Treatment Pain Level 4  -CK         Aquatics LE    Water Walk forward;side;backward   x3 with TA  -CK      Stretch 1 B calf stretch 2x 30  -CK      Stretch 2 B piriformis stretch 2 x 30  -CK      Stretch 3 B HS/adductor stretch w LN x 12  -CK      Stretch Other 1 lat stretch on wall 4 x15  -CK      Stretch Other 2 DKTC on wall 5 x 15 sec  -CK      Vertical Traction 5 min  -CK      Abdominals noodle   LN x 10 rectus and obliques  -CK      Clams suspended on noodle x 2 min  -CK      Hip Abd/Add B 15x  -CK      March in Place walking x 2 laps  -CK      Toe/Heel Raises tip toe/tandem walk x 2 laps  -CK      Uni-Squat leg press x 15 B LN  -CK      Step Ups B hip circles cw/ccw 10/10  -CK      Bicycle suspended x2 min  seated x 2o for warm up  -CK         Exercise 1    Exercise Name 1 hot tub x 5 min independently after completion of session to decrease soft tissue tension   -CK         Exercise 3    Exercise Name 3 BUE work: shldr abd/add, flex/ext, IR/ER, push/pull  -CK      Reps 3 12 ea  -CK       Additional Comments L2 paddles  -CK        User Key  (r) = Recorded By, (t) = Taken By, (c) = Cosigned By    Initials Name Provider Type    CK Onel Marin, PT Physical Therapist                                            Time Calculation:   Start Time: 0945  Stop Time: 1030  Time Calculation (min): 45 min  Total Timed Code Minutes- PT: 45 minute(s)  Therapy Suggested Charges     Code   Minutes Charges    None           Therapy Charges for Today     Code Description Service Date Service Provider Modifiers Qty    60039871323 HC PT AQUATIC THERAPY EA 15 MIN 8/13/2018 Onel Marin, PT GP 3                    Onel Marin PT  8/13/2018

## 2018-08-20 ENCOUNTER — HOSPITAL ENCOUNTER (OUTPATIENT)
Dept: PHYSICAL THERAPY | Facility: HOSPITAL | Age: 58
Setting detail: THERAPIES SERIES
Discharge: HOME | End: 2018-08-20

## 2018-08-20 DIAGNOSIS — G89.29 CHRONIC BILATERAL LOW BACK PAIN, WITH SCIATICA PRESENCE UNSPECIFIED: Primary | ICD-10-CM

## 2018-08-20 DIAGNOSIS — Z74.09 IMPAIRED MOBILITY AND ADLS: ICD-10-CM

## 2018-08-20 DIAGNOSIS — M54.5 CHRONIC BILATERAL LOW BACK PAIN, WITH SCIATICA PRESENCE UNSPECIFIED: Primary | ICD-10-CM

## 2018-08-20 DIAGNOSIS — R53.1 WEAKNESS GENERALIZED: ICD-10-CM

## 2018-08-20 DIAGNOSIS — Z78.9 IMPAIRED MOBILITY AND ADLS: ICD-10-CM

## 2018-08-20 DIAGNOSIS — R29.3 POSTURE IMBALANCE: ICD-10-CM

## 2018-08-20 PROCEDURE — 97113 AQUATIC THERAPY/EXERCISES: CPT | Performed by: PHYSICAL THERAPIST

## 2018-08-20 NOTE — THERAPY TREATMENT NOTE
Outpatient Physical Therapy Ortho Treatment Note  [facility]     Patient Name: Flaquita Cr  : 1960  MRN: [id number]  Today's Date: 2018      Visit Date: 2018    Visit Dx:    ICD-10-CM ICD-9-CM   1. Chronic bilateral low back pain, with sciatica presence unspecified M54.5 724.2    G89.29 338.29   2. Impaired mobility and ADLs Z74.09 799.89   3. Posture imbalance R29.3 729.90   4. Weakness generalized R53.1 780.79       Patient Active Problem List   Diagnosis   • Arthritis of hand   • Reactive airway disease with acute exacerbation   • Secondary hypertension   • Chronic back pain   • Acne rosacea   • Chronic fatigue   • Overactive bladder   • Diverticulosis of large intestine without hemorrhage   • COPD (chronic obstructive pulmonary disease) (CMS/[facility])   • Asthma   • IBS (irritable bowel syndrome)   • Depression   • Current every day smoker   • TAI (obstructive sleep apnea)   • PAD (peripheral artery disease) (CMS/[facility])   • GERD (gastroesophageal reflux disease)   • AR (allergic rhinitis)   • Hyperglycemia   • Hyperlipidemia   • Vitamin D deficiency   • Osteopenia        Past Medical History:   Diagnosis Date   • Allergy     Allergies   • Arthritis     failed naproxena dn meloxicam - celebrex works well   • Asthma    • Circulation problem    • Colon polyp    • COPD (chronic obstructive pulmonary disease) (CMS/[facility])     does not use O2   • Depression    • Emphysema of lung (CMS/[facility])    • Fatigue    • Gastritis    • Hyperlipidemia    • Hypertension     just started, no meds   • IBS (irritable bowel syndrome)    • OAB (overactive bladder)    • PAD (peripheral artery disease) (CMS/[facility])    • Peripheral arterial disease (CMS/[facility])    • Right leg pain    • Sleep apnea         Past Surgical History:   Procedure Laterality Date   • AORTA FEMORAL BYPASS     • COLONOSCOPY N/A 2017    NBIH, diverticulosis, four 5 to 6 mm polyps (one tubular adenoma)   • HYSTERECTOMY      endometriosis   •  LEG SURGERY Bilateral     for PAD   • ROOT CANAL      R upper jaw with rootcanal                             PT Assessment/Plan     Row Name 08/20/18 1107          PT Assessment    Assessment Comments Progressed session to include 1.5# ankle weights for BLE strengthening. Patient verbalizing improved tolerance to activity with therapy 1x/week.  Less cueing required today for core engagement. Will assess response to progression next session.   -CK       User Key  (r) = Recorded By, (t) = Taken By, (c) = Cosigned By    Initials Name Provider Type    CK Onel Marin, PT Physical Therapist                    Exercises     Row Name 08/20/18 1000             Subjective Comments    Subjective Comments I am feeling good today. I had a migraine for 3 days last week  -CK         Subjective Pain    Able to rate subjective pain? yes  -CK      Pre-Treatment Pain Level 3  -CK         Aquatics    29045 - Aquatic Therapy Minutes 45  -CK         Aquatics LE    Water Walk forward;side;backward   x3 with TA  -CK      Stretch 1 B calf stretch 2x 30  -CK      Stretch 2 B piriformis stretch 2 x 30  -CK      Stretch 3 B HS/adductor stretch w LN x 15  -CK      Stretch Other 1 lat stretch on wall 4 x15  -CK      Stretch Other 2 DKTC on wall 5 x 15 sec  -CK      Vertical Traction 5 min  -CK      Abdominals noodle   LN x 10 rectus and obliques  -CK      Clams suspended on noodle x 2 min  -CK      Hip Abd/Add B 15x, 1.5#  -CK      March in Place walking x 2 laps  -CK      Toe/Heel Raises tip toe/tandem walk x 2 laps  -CK      Uni-Squat leg press x 15 B blue ring  -CK      Uni-Clock susp. tuck ups x 12  -CK      Step Ups B hip circles cw/ccw 10/10, 1.5#  -CK      Bicycle suspended x2 min  seated x 2o for warm up  -CK         Exercise 1    Exercise Name 1 hot tub x 5 min independently after completion of session to decrease soft tissue tension   -CK         Exercise 2    Exercise Name 2 B quad stretch  -CK      Sets 2 2  -CK      Time 2 30 sec   -CK      Additional Comments Large noodle  -CK         Exercise 3    Exercise Name 3 BUE work: shldr abd/add, flex/ext, IR/ER, push/pull  -CK      Reps 3 12 ea  -CK      Additional Comments L3  -CK        User Key  (r) = Recorded By, (t) = Taken By, (c) = Cosigned By    Initials Name Provider Type    CK Onel Marin, PT Physical Therapist                                            Time Calculation:   Start Time: 1030  Stop Time: 1115  Time Calculation (min): 45 min  Total Timed Code Minutes- PT: 45 minute(s)  Therapy Suggested Charges     Code   Minutes Charges    39777 (CPT®) Hc Pt Neuromusc Re Education Ea 15 Min      60801 (CPT®) Hc Pt Ther Proc Ea 15 Min      39058 (CPT®) Hc Gait Training Ea 15 Min      24045 (CPT®) Hc Pt Therapeutic Act Ea 15 Min      36356 (CPT®) Hc Pt Manual Therapy Ea 15 Min      54313 (CPT®) Hc Pt Ther Massage- Per 15 Min      27512 (CPT®) Hc Pt Iontophoresis Ea 15 Min      87151 (CPT®) Hc Pt Elec Stim Ea-Per 15 Min      84676 (CPT®) Hc Pt Ultrasound Ea 15 Min      02211 (CPT®) Hc Pt Self Care/Mgmt/Train Ea 15 Min      88232 (CPT®) Hc Pt Prosthetic (S) Train Initial Encounter, Each 15 Min      72228 (CPT®) Hc Orthotic(S) Mgmt/Train Initial Encounter, Each 15min      34678 (CPT®) Hc Pt Aquatic Therapy Ea 15 Min 45 3    98608 (CPT®) Hc Pt Orthotic(S)/Prosthetic(S) Encounter, Each 15 Min      Total  45 3        Therapy Charges for Today     Code Description Service Date Service Provider Modifiers Qty    62852589373 HC PT AQUATIC THERAPY EA 15 MIN 8/20/2018 Onel Marin, PT GP 3                    Onel PATEL. Tania PT  8/20/2018

## 2018-08-24 ENCOUNTER — APPOINTMENT (OUTPATIENT)
Dept: MRI IMAGING | Facility: HOSPITAL | Age: 58
End: 2018-08-24

## 2018-08-27 ENCOUNTER — APPOINTMENT (OUTPATIENT)
Dept: PHYSICAL THERAPY | Facility: HOSPITAL | Age: 58
End: 2018-08-27

## 2018-08-29 ENCOUNTER — HOSPITAL ENCOUNTER (OUTPATIENT)
Dept: PHYSICAL THERAPY | Facility: HOSPITAL | Age: 58
Setting detail: THERAPIES SERIES
End: 2018-08-29

## 2018-08-31 ENCOUNTER — HOSPITAL ENCOUNTER (OUTPATIENT)
Dept: MRI IMAGING | Facility: HOSPITAL | Age: 58
Discharge: HOME | End: 2018-08-31

## 2018-08-31 ENCOUNTER — HOSPITAL ENCOUNTER (OUTPATIENT)
Dept: MRI IMAGING | Facility: HOSPITAL | Age: 58
Discharge: HOME | End: 2018-08-31
Admitting: NURSE PRACTITIONER

## 2018-08-31 DIAGNOSIS — M54.12 CERVICAL RADICULITIS: ICD-10-CM

## 2018-08-31 DIAGNOSIS — M54.16 LUMBAR RADICULITIS: ICD-10-CM

## 2018-08-31 DIAGNOSIS — M54.50 LUMBAR SPINE PAIN: ICD-10-CM

## 2018-08-31 DIAGNOSIS — M54.2 CERVICAL SPINE PAIN: ICD-10-CM

## 2018-08-31 PROCEDURE — 72141 MRI NECK SPINE W/O DYE: CPT

## 2018-08-31 PROCEDURE — 72148 MRI LUMBAR SPINE W/O DYE: CPT

## 2018-09-05 ENCOUNTER — HOSPITAL ENCOUNTER (OUTPATIENT)
Dept: PHYSICAL THERAPY | Facility: HOSPITAL | Age: 58
Setting detail: THERAPIES SERIES
Discharge: HOME | End: 2018-09-05

## 2018-09-05 DIAGNOSIS — M54.42 CHRONIC BILATERAL LOW BACK PAIN WITH LEFT-SIDED SCIATICA: ICD-10-CM

## 2018-09-05 DIAGNOSIS — Z78.9 IMPAIRED MOBILITY AND ADLS: ICD-10-CM

## 2018-09-05 DIAGNOSIS — G89.29 CHRONIC BILATERAL LOW BACK PAIN WITH LEFT-SIDED SCIATICA: ICD-10-CM

## 2018-09-05 DIAGNOSIS — R29.3 POSTURE IMBALANCE: ICD-10-CM

## 2018-09-05 DIAGNOSIS — G89.29 CHRONIC BILATERAL LOW BACK PAIN, WITH SCIATICA PRESENCE UNSPECIFIED: Primary | ICD-10-CM

## 2018-09-05 DIAGNOSIS — R53.1 WEAKNESS GENERALIZED: ICD-10-CM

## 2018-09-05 DIAGNOSIS — M54.5 CHRONIC BILATERAL LOW BACK PAIN, WITH SCIATICA PRESENCE UNSPECIFIED: Primary | ICD-10-CM

## 2018-09-05 DIAGNOSIS — Z74.09 IMPAIRED MOBILITY AND ADLS: ICD-10-CM

## 2018-09-05 PROCEDURE — 97113 AQUATIC THERAPY/EXERCISES: CPT | Performed by: PHYSICAL THERAPIST

## 2018-09-05 NOTE — THERAPY PROGRESS REPORT/RE-CERT
Outpatient Physical Therapy Ortho Re-Assessment  [facility]     Patient Name: Flaquita Cr  : 1960  MRN: [id number]  Today's Date: 2018      Visit Date: 2018    Patient Active Problem List   Diagnosis   • Arthritis of hand   • Reactive airway disease with acute exacerbation   • Secondary hypertension   • Chronic back pain   • Acne rosacea   • Chronic fatigue   • Overactive bladder   • Diverticulosis of large intestine without hemorrhage   • COPD (chronic obstructive pulmonary disease) (CMS/[facility])   • Asthma   • IBS (irritable bowel syndrome)   • Depression   • Current every day smoker   • TAI (obstructive sleep apnea)   • PAD (peripheral artery disease) (CMS/[facility])   • GERD (gastroesophageal reflux disease)   • AR (allergic rhinitis)   • Hyperglycemia   • Hyperlipidemia   • Vitamin D deficiency   • Osteopenia        Past Medical History:   Diagnosis Date   • Allergy     Allergies   • Arthritis     failed naproxena dn meloxicam - celebrex works well   • Asthma    • Circulation problem    • Colon polyp    • COPD (chronic obstructive pulmonary disease) (CMS/[facility])     does not use O2   • Depression    • Emphysema of lung (CMS/[facility])    • Fatigue    • Gastritis    • Hyperlipidemia    • Hypertension     just started, no meds   • IBS (irritable bowel syndrome)    • OAB (overactive bladder)    • PAD (peripheral artery disease) (CMS/[facility])    • Peripheral arterial disease (CMS/[facility])    • Right leg pain    • Sleep apnea         Past Surgical History:   Procedure Laterality Date   • AORTA FEMORAL BYPASS     • COLONOSCOPY N/A 2017    NBIH, diverticulosis, four 5 to 6 mm polyps (one tubular adenoma)   • HYSTERECTOMY      endometriosis   • LEG SURGERY Bilateral     for PAD   • ROOT CANAL      R upper jaw with rootcanal       Visit Dx:     ICD-10-CM ICD-9-CM   1. Chronic bilateral low back pain, with sciatica presence unspecified M54.5 724.2    G89.29 338.29   2. Impaired mobility and ADLs Z74.09  799.89   3. Posture imbalance R29.3 729.90   4. Weakness generalized R53.1 780.79   5. Chronic bilateral low back pain with left-sided sciatica M54.42 724.2    G89.29 724.3     338.29                 PT Ortho     Row Name 09/05/18 1300       Myotomal Screen- Lower Quarter Clearing    Hip flexion (L2) Bilateral:;4+ (Good +)  -CK    Knee extension (L3) Bilateral:;5 (Normal)  -CK    Ankle DF (L4) Bilateral:;5 (Normal)  -CK    Knee flexion (S2) Bilateral:;4+ (Good +)  -CK       Lumbar ROM Screen- Lower Quarter Clearing    Lumbar Flexion Impaired   Limited 25%  -CK    Lumbar Extension Normal  -CK    Lumbar Lateral Flexion Normal  -CK    Lumbar Rotation Normal  -CK       Flexibility    Flexibility Tested? Lower Extremity  -CK       Lower Extremity Flexibility    Hamstrings Bilateral:;Moderately limited  -CK    Hip Flexors Bilateral:;Moderately limited  -CK    Hip External Rotators Bilateral:;Moderately limited  -CK    Hip Internal Rotators Bilateral:;Moderately limited  -CK    Quadratus Lumborum Bilateral:;Moderately limited  -CK       Manual Muscle Testing (MMT)    Comment, General Manual Muscle Testing (MMT) Assessment Core MMT grossly 3+/5; B hip abduction: 4-/5, B hip ext: 3+/5  -CK      User Key  (r) = Recorded By, (t) = Taken By, (c) = Cosigned By    Initials Name Provider Type    CK Onel Marin S, PT Physical Therapist                                  PT OP Goals     Row Name 09/05/18 1200          Long Term Goals    LTG 1 Reduce pain to 3/10 with water exercise and more centralized (from lateral hips).   -CK     LTG 1 Progress Ongoing  -CK     LTG 1 Progress Comments 3/10 at start of session; 5/10 at end  -CK     LTG 2 Pt to sit 5 min without shifting due to increased pain (on eval shifted every few minutes, sits on leg in chair each side.)   -CK     LTG 2 Progress Met  -CK     LTG 3 5x sit to stand from 26.28 seconds with hands to 20 seconds no hands. (use hands on eval and was short of air by end)   -CK     LTG  3 Progress Partially Met  -CK     LTG 3 Progress Comments 20 sec today, will monitor for consistency  -CK     LTG 4 Modified Oswestry perceived disability from 60% on eval to 42% on eval.   -CK     LTG 4 Progress Ongoing  -CK     LTG 4 Progress Comments 52% with reassessment  -CK     LTG 5 Pt independent with self management of condition with water exercise.  Pt plans membership to [employer] and also to use inversion table at home.    -CK     LTG 5 Progress Ongoing  -CK     LTG 5 Progress Comments requires cueing for all exercises at this time.unsure of onversion table use  -CK     LTG 6 patient will be able to walk grocery store or [employer] without seated rest break and minimal increase in pain level for improved community mobility  -CK     LTG 6 Progress Ongoing  -CK     LTG 6 Progress Comments able but increased pain with extended standing/walking  -CK       User Key  (r) = Recorded By, (t) = Taken By, (c) = Cosigned By    Initials Name Provider Type    CK Onel Marin, PT Physical Therapist                PT Assessment/Plan     Row Name 09/05/18 5266          PT Assessment    Functional Limitations Limitations in community activities;Limitations in functional capacity and performance;Performance in self-care ADL;Limitation in home management  -CK     Impairments Pain;Posture;Muscle strength;Impaired flexibility;Impaired aerobic capacity  -CK     Assessment Comments Ms. Cr has been seen for 10 skilled aquatic therapy sessions since initiating treatment for chronic LBP, B hip pain, and L foot/toe radicular pain. She initially started at 2x/week but had difficulty tolerating activity due to chronic sedentary lifestyle. Changed to 1x/week which she has tolerated better. Overall, she reports less pain in AM. She still reports increasing pain with extended standing or walking activities. MRI completed last week by Dr. Nicole for lumbar/radicular complaints. Follow up/results to be given this  Friday. Slight improvement noted in strength and awareness of core activation. Self scored improvement to a 52% on the Oswestry Index for lumbar dysfunction. Se remains highly appropriate for continued aquatic services for progression of strengthening program to address hip/core weakness and ease pain with all daily functional mobility tasks.   -CK     Please refer to paper survey for additional self-reported information Yes  -CK     Rehab Potential Good  -CK     Patient/caregiver participated in establishment of treatment plan and goals Yes  -CK     Patient would benefit from skilled therapy intervention Yes  -CK        PT Plan    PT Frequency 1x/week;2x/week  -CK     Predicted Duration of Therapy Intervention (Therapy Eval) 4 weeks  -CK     Planned CPT's? PT THER PROC EA 15 MIN: 03270;PT AQUATIC THERAPY EA 15 MIN: 40391;PT HOT OR COLD PACK TREAT MCARE;PT ELECTRICAL STIM UNATTEND: ;PT TRACTION LUMBAR: 29243  -CK       User Key  (r) = Recorded By, (t) = Taken By, (c) = Cosigned By    Initials Name Provider Type    CK Onel Marin, PT Physical Therapist                  Exercises     Row Name 09/05/18 1100             Subjective Comments    Subjective Comments Sorry I had to miss last week, I had a migraine.   -CK         Subjective Pain    Able to rate subjective pain? yes  -CK      Pre-Treatment Pain Level 3  -CK         Aquatics    17220 - Aquatic Therapy Minutes 45  -CK         Aquatics LE    Water Walk forward;side;backward   x3 with TA  -CK      Stretch 1 B calf stretch 2x 30  -CK      Stretch 2 B piriformis stretch 2 x 30  -CK      Stretch 3 B HS/adductor stretch w LN x 15  -CK      Stretch Other 1 --  -CK      Stretch Other 2 DKTC on wall 5 x 15 sec  -CK      Vertical Traction 5 min  -CK      Abdominals noodle   LN x 12 rectus and obliques  -CK      Clams suspended on noodle x 2 min  -CK      Hip Abd/Add B 15x, 1.5#  -CK      Hip Flex/Ext HS curls x 15 1.5# B  -CK      March in Place walking x 2 laps   -CK      Toe/Heel Raises tip toe/tandem walk x 2 laps  -CK      Uni-Squat leg press x 15 B blue ring  -CK      Step Ups B hip circles cw/ccw 10/10, 1.5#  -CK      Bicycle suspended x2 min;  seated x 2o for warm up  -CK         Exercise 1    Exercise Name 1 hot tub x 5 min independently after completion of session to decrease soft tissue tension   -CK         Exercise 2    Exercise Name 2 B quad stretch  -CK      Sets 2 2  -CK      Time 2 30 sec  -CK         Exercise 3    Exercise Name 3 BUE work: shldr abd/add, flex/ext, IR/ER, push/pull  -CK      Reps 3 12 ea  -CK        User Key  (r) = Recorded By, (t) = Taken By, (c) = Cosigned By    Initials Name Provider Type    CK Onel Marin, PT Physical Therapist                        Outcome Measure Options: 5x Sit to Stand, Modifed Owestry  5 Times Sit to Stand  5 Times Sit to Stand (seconds): 20 seconds  Modified Oswestry  Modified Oswestry Score/Comments: 52%      Time Calculation:     Therapy Suggested Charges     Code   Minutes Charges    08588 (CPT®) Hc Pt Neuromusc Re Education Ea 15 Min      45266 (CPT®) Hc Pt Ther Proc Ea 15 Min      84657 (CPT®) Hc Gait Training Ea 15 Min      72718 (CPT®) Hc Pt Therapeutic Act Ea 15 Min      72007 (CPT®) Hc Pt Manual Therapy Ea 15 Min      76893 (CPT®) Hc Pt Ther Massage- Per 15 Min      23475 (CPT®) Hc Pt Iontophoresis Ea 15 Min      96450 (CPT®) Hc Pt Elec Stim Ea-Per 15 Min      71928 (CPT®) Hc Pt Ultrasound Ea 15 Min      84847 (CPT®) Hc Pt Self Care/Mgmt/Train Ea 15 Min      93645 (CPT®) Hc Pt Prosthetic (S) Train Initial Encounter, Each 15 Min      51840 (CPT®) Hc Orthotic(S) Mgmt/Train Initial Encounter, Each 15min      66304 (CPT®) Hc Pt Aquatic Therapy Ea 15 Min 45 3    12178 (CPT®) Hc Pt Orthotic(S)/Prosthetic(S) Encounter, Each 15 Min      Total  45 3          Start Time: 1115  Stop Time: 1200  Time Calculation (min): 45 min  Total Timed Code Minutes- PT: 45 minute(s)     Therapy Charges for Today     Code  Description Service Date Service Provider Modifiers Qty    11315673814 HC PT AQUATIC THERAPY EA 15 MIN 9/5/2018 Onel Marin, PT GP 3          PT G-Codes  Outcome Measure Options: 5x Sit to Stand, Modifed Owestry  Modified Oswestry Score/Comments: 52%         Onel Marin, PT  9/5/2018

## 2018-09-07 ENCOUNTER — OFFICE VISIT (OUTPATIENT)
Dept: NEUROSURGERY | Facility: CLINIC | Age: 58
End: 2018-09-07

## 2018-09-07 VITALS
WEIGHT: 200 LBS | SYSTOLIC BLOOD PRESSURE: 119 MMHG | HEART RATE: 64 BPM | DIASTOLIC BLOOD PRESSURE: 55 MMHG | HEIGHT: 62 IN | BODY MASS INDEX: 36.8 KG/M2

## 2018-09-07 DIAGNOSIS — R20.0 BILATERAL HAND NUMBNESS: Primary | ICD-10-CM

## 2018-09-07 DIAGNOSIS — M51.36 DDD (DEGENERATIVE DISC DISEASE), LUMBAR: ICD-10-CM

## 2018-09-07 DIAGNOSIS — M48.02 CERVICAL STENOSIS OF SPINAL CANAL: ICD-10-CM

## 2018-09-07 DIAGNOSIS — M54.16 LUMBAR RADICULOPATHY: ICD-10-CM

## 2018-09-07 PROBLEM — M51.369 DDD (DEGENERATIVE DISC DISEASE), LUMBAR: Status: ACTIVE | Noted: 2018-09-07

## 2018-09-07 PROCEDURE — 99214 OFFICE O/P EST MOD 30 MIN: CPT | Performed by: PHYSICIAN ASSISTANT

## 2018-09-12 ENCOUNTER — HOSPITAL ENCOUNTER (OUTPATIENT)
Dept: PHYSICAL THERAPY | Facility: HOSPITAL | Age: 58
Setting detail: THERAPIES SERIES
Discharge: HOME | End: 2018-09-12

## 2018-09-12 DIAGNOSIS — Z74.09 IMPAIRED MOBILITY AND ADLS: ICD-10-CM

## 2018-09-12 DIAGNOSIS — G89.29 CHRONIC BILATERAL LOW BACK PAIN WITH LEFT-SIDED SCIATICA: ICD-10-CM

## 2018-09-12 DIAGNOSIS — G89.29 CHRONIC BILATERAL LOW BACK PAIN, WITH SCIATICA PRESENCE UNSPECIFIED: Primary | ICD-10-CM

## 2018-09-12 DIAGNOSIS — R53.1 WEAKNESS GENERALIZED: ICD-10-CM

## 2018-09-12 DIAGNOSIS — R29.3 POSTURE IMBALANCE: ICD-10-CM

## 2018-09-12 DIAGNOSIS — M54.5 CHRONIC BILATERAL LOW BACK PAIN, WITH SCIATICA PRESENCE UNSPECIFIED: Primary | ICD-10-CM

## 2018-09-12 DIAGNOSIS — M54.42 CHRONIC BILATERAL LOW BACK PAIN WITH LEFT-SIDED SCIATICA: ICD-10-CM

## 2018-09-12 DIAGNOSIS — Z78.9 IMPAIRED MOBILITY AND ADLS: ICD-10-CM

## 2018-09-12 PROCEDURE — 97113 AQUATIC THERAPY/EXERCISES: CPT | Performed by: PHYSICAL THERAPIST

## 2018-09-12 NOTE — THERAPY TREATMENT NOTE
Outpatient Physical Therapy Ortho Treatment Note  [facility]     Patient Name: Flaquita Cr  : 1960  MRN: [id number]  Today's Date: 2018      Visit Date: 2018    Visit Dx:    ICD-10-CM ICD-9-CM   1. Chronic bilateral low back pain, with sciatica presence unspecified M54.5 724.2    G89.29 338.29   2. Impaired mobility and ADLs Z74.09 799.89   3. Posture imbalance R29.3 729.90   4. Weakness generalized R53.1 780.79   5. Chronic bilateral low back pain with left-sided sciatica M54.42 724.2    G89.29 724.3     338.29       Patient Active Problem List   Diagnosis   • Arthritis of hand   • Reactive airway disease with acute exacerbation   • Secondary hypertension   • Chronic back pain   • Acne rosacea   • Chronic fatigue   • Overactive bladder   • Diverticulosis of large intestine without hemorrhage   • COPD (chronic obstructive pulmonary disease) (CMS/[facility])   • Asthma   • IBS (irritable bowel syndrome)   • Depression   • Current every day smoker   • TIA (obstructive sleep apnea)   • PAD (peripheral artery disease) (CMS/[facility])   • GERD (gastroesophageal reflux disease)   • AR (allergic rhinitis)   • Hyperglycemia   • Hyperlipidemia   • Vitamin D deficiency   • Osteopenia   • Cervical stenosis of spinal canal   • DDD (degenerative disc disease), lumbar   • Lumbar radiculopathy   • Bilateral hand numbness        Past Medical History:   Diagnosis Date   • Allergy     Allergies   • Arthritis     failed naproxena dn meloxicam - celebrex works well   • Asthma    • Circulation problem    • Colon polyp    • COPD (chronic obstructive pulmonary disease) (CMS/[facility])     does not use O2   • Depression    • Emphysema of lung (CMS/[facility])    • Fatigue    • Gastritis    • Hyperlipidemia    • Hypertension     just started, no meds   • IBS (irritable bowel syndrome)    • OAB (overactive bladder)    • PAD (peripheral artery disease) (CMS/[facility])    • Peripheral arterial disease (CMS/[facility])    • Right leg pain    • Sleep  apnea         Past Surgical History:   Procedure Laterality Date   • AORTA FEMORAL BYPASS  2011   • COLONOSCOPY N/A 8/24/2017    NBIH, diverticulosis, four 5 to 6 mm polyps (one tubular adenoma)   • HYSTERECTOMY  1988    endometriosis   • LEG SURGERY Bilateral     for PAD   • ROOT CANAL      R upper jaw with rootcanal                             PT Assessment/Plan     Row Name 09/12/18 1635          PT Assessment    Assessment Comments Follow up with neurosurgery re. MRI reports. Per patient and review of documents no significant stenosis or nerve compression indicating needs for surgical intervention. Instead, plans to consider ESIs due to degenerative/arthritic changes. Overall, patient reporting minimal change in pain rating but noted improved function with daily activities.   -CK       User Key  (r) = Recorded By, (t) = Taken By, (c) = Cosigned By    Initials Name Provider Type    CK Onel Marin, PT Physical Therapist                    Exercises     Row Name 09/12/18 1100             Subjective Comments    Subjective Comments Saw the MD and they said MRI wasnt bad. Mostly arthritis. They want to do epidurals.   -CK         Subjective Pain    Able to rate subjective pain? yes  -CK      Pre-Treatment Pain Level 3  -CK         Aquatics    35539 - Aquatic Therapy Minutes 45  -CK         Aquatics LE    Water Walk forward;side;backward   x3 with TA  -CK      Stretch 1 B calf stretch 2x 30  -CK      Stretch 2 B piriformis stretch 2 x 30  -CK      Stretch 3 B HS/adductor stretch w LN x 15  -CK      Stretch Other 2 DKTC on wall 5 x 15 sec  -CK      Vertical Traction 5 min  -CK      Abdominals noodle   LN x 12 rectus and obliques  -CK      Clams suspended on noodle x 2 min  -CK      Hip Abd/Add B 15x, 1.5#  -CK      Hip Flex/Ext HS curls x 15 1.5# B  -CK      March in Place walking x 2 laps  -CK      Toe/Heel Raises tip toe/tandem walk x 2 laps  -CK      Uni-Squat leg press x 15 B blue ring  -CK      Uni-Clock susp.  tuck ups x 12  -CK      Step Ups B hip circles cw/ccw 10/10, 1.5#  -CK      Bicycle suspended x2 min;  seated x 2o for warm up  -CK         Exercise 1    Exercise Name 1 hot tub x 5 min independently after completion of session to decrease soft tissue tension   -CK         Exercise 2    Exercise Name 2 B quad stretch  -CK      Sets 2 2  -CK      Time 2 30 sec  -CK         Exercise 3    Exercise Name 3 BUE work: shldr abd/add, flex/ext, IR/ER, push/pull  -CK      Reps 3 15 each  -CK        User Key  (r) = Recorded By, (t) = Taken By, (c) = Cosigned By    Initials Name Provider Type    CK Onel Marin, PT Physical Therapist                                            Time Calculation:   Start Time: 1100  Stop Time: 1145  Time Calculation (min): 45 min  Total Timed Code Minutes- PT: 45 minute(s)  Therapy Suggested Charges     Code   Minutes Charges    15380 (CPT®) Hc Pt Neuromusc Re Education Ea 15 Min      72052 (CPT®) Hc Pt Ther Proc Ea 15 Min      68275 (CPT®) Hc Gait Training Ea 15 Min      92585 (CPT®) Hc Pt Therapeutic Act Ea 15 Min      56569 (CPT®) Hc Pt Manual Therapy Ea 15 Min      57921 (CPT®) Hc Pt Ther Massage- Per 15 Min      85107 (CPT®) Hc Pt Iontophoresis Ea 15 Min      35251 (CPT®) Hc Pt Elec Stim Ea-Per 15 Min      03749 (CPT®) Hc Pt Ultrasound Ea 15 Min      92369 (CPT®) Hc Pt Self Care/Mgmt/Train Ea 15 Min      53870 (CPT®) Hc Pt Prosthetic (S) Train Initial Encounter, Each 15 Min      46588 (CPT®) Hc Orthotic(S) Mgmt/Train Initial Encounter, Each 15min      06202 (CPT®) Hc Pt Aquatic Therapy Ea 15 Min 45 3    91335 (CPT®) Hc Pt Orthotic(S)/Prosthetic(S) Encounter, Each 15 Min      Total  45 3        Therapy Charges for Today     Code Description Service Date Service Provider Modifiers Qty    77495185444 HC PT AQUATIC THERAPY EA 15 MIN 9/12/2018 Onel Marin PT GP 3                    Onel Marin PT  9/12/2018

## 2018-09-19 ENCOUNTER — APPOINTMENT (OUTPATIENT)
Dept: PHYSICAL THERAPY | Facility: HOSPITAL | Age: 58
End: 2018-09-19

## 2018-09-21 ENCOUNTER — ANESTHESIA (OUTPATIENT)
Dept: PAIN MEDICINE | Facility: HOSPITAL | Age: 58
End: 2018-09-21

## 2018-09-21 ENCOUNTER — HOSPITAL ENCOUNTER (OUTPATIENT)
Dept: GENERAL RADIOLOGY | Facility: HOSPITAL | Age: 58
Discharge: HOME | End: 2018-09-21

## 2018-09-21 ENCOUNTER — HOSPITAL ENCOUNTER (OUTPATIENT)
Dept: PAIN MEDICINE | Facility: HOSPITAL | Age: 58
Discharge: HOME | End: 2018-09-21
Admitting: ANESTHESIOLOGY

## 2018-09-21 ENCOUNTER — ANESTHESIA EVENT (OUTPATIENT)
Dept: PAIN MEDICINE | Facility: HOSPITAL | Age: 58
End: 2018-09-21

## 2018-09-21 VITALS
OXYGEN SATURATION: 94 % | WEIGHT: 199 LBS | DIASTOLIC BLOOD PRESSURE: 91 MMHG | HEART RATE: 93 BPM | BODY MASS INDEX: 33.97 KG/M2 | RESPIRATION RATE: 16 BRPM | SYSTOLIC BLOOD PRESSURE: 125 MMHG | HEIGHT: 64 IN

## 2018-09-21 DIAGNOSIS — R52 PAIN: ICD-10-CM

## 2018-09-21 DIAGNOSIS — M54.16 LUMBAR RADICULOPATHY: ICD-10-CM

## 2018-09-21 DIAGNOSIS — M51.36 DDD (DEGENERATIVE DISC DISEASE), LUMBAR: Primary | ICD-10-CM

## 2018-09-21 PROCEDURE — 77003 FLUOROGUIDE FOR SPINE INJECT: CPT

## 2018-09-21 PROCEDURE — 25010000002 FENTANYL CITRATE (PF) 100 MCG/2ML SOLUTION: Performed by: ANESTHESIOLOGY

## 2018-09-21 PROCEDURE — 25010000002 METHYLPREDNISOLONE PER 80 MG: Performed by: ANESTHESIOLOGY

## 2018-09-21 PROCEDURE — 25010000002 MIDAZOLAM PER 1 MG: Performed by: ANESTHESIOLOGY

## 2018-09-21 PROCEDURE — C1755 CATHETER, INTRASPINAL: HCPCS

## 2018-09-21 RX ORDER — METHYLPREDNISOLONE ACETATE 80 MG/ML
80 INJECTION, SUSPENSION INTRA-ARTICULAR; INTRALESIONAL; INTRAMUSCULAR; SOFT TISSUE ONCE
Status: COMPLETED | OUTPATIENT
Start: 2018-09-21 | End: 2018-09-21

## 2018-09-21 RX ORDER — SODIUM CHLORIDE 0.9 % (FLUSH) 0.9 %
1-10 SYRINGE (ML) INJECTION AS NEEDED
Status: DISCONTINUED | OUTPATIENT
Start: 2018-09-21 | End: 2018-09-22 | Stop reason: HOSPADM

## 2018-09-21 RX ORDER — FENTANYL CITRATE 50 UG/ML
50 INJECTION, SOLUTION INTRAMUSCULAR; INTRAVENOUS AS NEEDED
Status: DISCONTINUED | OUTPATIENT
Start: 2018-09-21 | End: 2018-09-22 | Stop reason: HOSPADM

## 2018-09-21 RX ORDER — LIDOCAINE HYDROCHLORIDE 10 MG/ML
1 INJECTION, SOLUTION INFILTRATION; PERINEURAL ONCE AS NEEDED
Status: DISCONTINUED | OUTPATIENT
Start: 2018-09-21 | End: 2018-09-22 | Stop reason: HOSPADM

## 2018-09-21 RX ORDER — MIDAZOLAM HYDROCHLORIDE 1 MG/ML
1 INJECTION INTRAMUSCULAR; INTRAVENOUS AS NEEDED
Status: DISCONTINUED | OUTPATIENT
Start: 2018-09-21 | End: 2018-09-22 | Stop reason: HOSPADM

## 2018-09-21 RX ORDER — LIDOCAINE HYDROCHLORIDE 10 MG/ML
0.5 INJECTION, SOLUTION INFILTRATION; PERINEURAL ONCE AS NEEDED
Status: DISCONTINUED | OUTPATIENT
Start: 2018-09-21 | End: 2018-09-22 | Stop reason: HOSPADM

## 2018-09-21 RX ORDER — OXYCODONE AND ACETAMINOPHEN 7.5; 325 MG/1; MG/1
1 TABLET ORAL EVERY 6 HOURS PRN
COMMUNITY
End: 2018-12-06

## 2018-09-21 RX ORDER — PSEUDOEPHEDRINE HCL 30 MG
30 TABLET ORAL EVERY 4 HOURS PRN
COMMUNITY
End: 2020-06-30

## 2018-09-21 RX ORDER — BEPOTASTINE BESILATE 15 MG/ML
SOLUTION/ DROPS OPHTHALMIC
COMMUNITY
End: 2019-01-31

## 2018-09-21 RX ORDER — DIPHENHYDRAMINE HCL 25 MG
25 CAPSULE ORAL EVERY 6 HOURS PRN
COMMUNITY

## 2018-09-21 RX ADMIN — MIDAZOLAM HYDROCHLORIDE 1 MG: 2 INJECTION, SOLUTION INTRAMUSCULAR; INTRAVENOUS at 12:05

## 2018-09-21 RX ADMIN — METHYLPREDNISOLONE ACETATE 80 MG: 80 INJECTION, SUSPENSION INTRA-ARTICULAR; INTRALESIONAL; INTRAMUSCULAR; SOFT TISSUE at 12:09

## 2018-09-21 RX ADMIN — FENTANYL CITRATE 50 MCG: 50 INJECTION INTRAMUSCULAR; INTRAVENOUS at 12:05

## 2018-09-21 NOTE — ANESTHESIA PROCEDURE NOTES
PAIN Epidural block    Pre-sedation assessment completed: 9/21/2018 12:03 PM    Patient reassessed immediately prior to procedure    Start Time: 9/21/2018 12:04 PM  Stop Time: 9/21/2018 12:10 PM  Indication:at surgeon's request and procedure for pain  Performed By  Anesthesiologist: RENDER, KADEN RAY  Preanesthetic Checklist  Completed: patient identified, site marked, surgical consent, pre-op evaluation, timeout performed, IV checked, risks and benefits discussed and monitors and equipment checked  Additional Notes  Post-Op Diagnosis Codes:     * Lumbago (M54.5)     * Lumbar neuritis (M54.16)     * Degeneration of lumbar intervertebral disc (M51.36)     * Spondylosis of lumbar spine (M47.816)    Prep:  Pt Position:prone  Sterile Tech:sterile barrier, mask and gloves  Prep:chlorhexidine gluconate and isopropyl alcohol  Monitoring:blood pressure monitoring, continuous pulse oximetry and EKG  Procedure:  Approach:left paramedian  Guidance: fluoroscopy  Location:lumbar  Interspace: L5S1.  Needle Gauge:20 G  Aspiration:negative  Test Dose:negative  Medications:  Depomedrol:80 mg    Post Assessment:  Pt Tolerance:patient tolerated the procedure well with no apparent complications  Complications:no

## 2018-09-21 NOTE — H&P
[facility]    History and Physical    Patient Name: Flaquita Cr  :  1960  MRN:  [id number]  Date of Admission: 2018    Subjective     Patient is a 58 y.o. female presents with chief complaint of chronic low back, hips: bilateral and buttock pain.  Onset of symptoms was gradual starting several years ago.  Symptoms are associated/aggravated by activity. Symptoms improve with nothing    The following portions of the patients history were reviewed and updated as appropriate: current medications, allergies, past medical history, past surgical history, past family history, past social history and problem list     She's had pain for years that general is getting worse.  She is has pain in her lumbar region which radiates to both of her hips and down the lateral aspect of both of her legs.  She states that her great toe on the left is getting very numb she denies any obvious weakness.  She says she is unable to ambulate for any distance mainly due to pain.  She denies any falls.  She's done physical therapy with minimal relief of her pain she does take nonsteroidal anti-inflammatories which she things are somewhat helpful.  She generates her pain between a 3 and a 7 out of 10.  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      MRI report:  No significant change when compared to prior MRI of the lumbar spine  [facility] on 2015. There is mild bilateral  facet overgrowth L4-S1, mild disc space narrowing and disc desiccation  at L5-S1 with a posterior annular tear and a broad-based posterior  central disc bulge or small disc protrusion that does not appear to abut  the traversing S1 nerve roots and does not result in any canal or  lateral recess narrowing. The remainder of the lumbar spine MRI is  normal.                Objective     Past Medical History:   Past Medical History:   Diagnosis  Date   • Allergy     Allergies   • Arthritis     failed naproxena dn meloxicam - celebrex works well   • Asthma    • Circulation problem    • Colon polyp    • COPD (chronic obstructive pulmonary disease) (CMS/HCC)     does not use O2   • Depression    • Emphysema of lung (CMS/HCC)    • Fatigue    • Gastritis    • GERD (gastroesophageal reflux disease)    • Hyperlipidemia    • Hypertension     just started, no meds   • IBS (irritable bowel syndrome)    • OAB (overactive bladder)    • PAD (peripheral artery disease) (CMS/HCC)    • Peripheral arterial disease (CMS/HCC)    • Right leg pain    • Sleep apnea      Past Surgical History:   Past Surgical History:   Procedure Laterality Date   • AORTA FEMORAL BYPASS  2011   • COLONOSCOPY N/A 8/24/2017    NBIH, diverticulosis, four 5 to 6 mm polyps (one tubular adenoma)   • HYSTERECTOMY  1988    endometriosis   • LEG SURGERY Bilateral     for PAD   • ROOT CANAL      R upper jaw with rootcanal     Family History:   Family History   Problem Relation Age of Onset   • Osteoporosis Mother    • Atrial fibrillation Mother         pacemaker   • Arthritis Mother    • Alzheimer's disease Mother    • Macular degeneration Father    • Cancer Father    • Arthritis Sister    • Gout Brother    • Arthritis Brother    • Colon cancer Maternal Aunt    • Uterine cancer Maternal Aunt    • Breast cancer Neg Hx      Social History:   Social History   Substance Use Topics   • Smoking status: Current Every Day Smoker     Years: 0.20     Types: Cigarettes, Electronic Cigarette   • Smokeless tobacco: Never Used      Comment: Began smoking at age 10.  Smoked 1 ppd for 15 years, 2 ppd for 18 years, and 2.5 ppd for 15 years for an 88.5 pack year history.  Currently smoking 4 tobacco cigarettes daily with frequent use of an e-cigarette.  Using e-cig since 2010.   • Alcohol use Yes      Comment: once per year, holidays       Vital Signs Range for the last 24 hours  Temperature:     Temp Source:     BP: BP:  "(120)/(80) 120/80   Pulse: Heart Rate:  [108] 108   Respirations: Resp:  [16] 16   SPO2: SpO2:  [95 %] 95 %   O2 Amount (l/min):     O2 Devices Device (Oxygen Therapy): room air   Weight: Weight:  [90.3 kg (199 lb)] 90.3 kg (199 lb)     Flowsheet Rows      First Filed Value   Admission Height  162.6 cm (64\") Documented at 09/21/2018 1147   Admission Weight  90.3 kg (199 lb) Documented at 09/21/2018 1147          --------------------------------------------------------------------------------    Current Outpatient Prescriptions   Medication Sig Dispense Refill   • Acetaminophen (TYLENOL ARTHRITIS PAIN PO) Take  by mouth.     • albuterol (PROVENTIL HFA;VENTOLIN HFA) 108 (90 Base) MCG/ACT inhaler Inhale 2 puffs Every 4 (Four) Hours As Needed for Wheezing or Shortness of Air. 18 g 1   • Bepotastine Besilate 1.5 % solution Apply  to eye(s) as directed by provider.     • celecoxib (CELEBREX) 200 MG capsule Take 1 capsule by mouth 2 (Two) Times a Day. 60 capsule 5   • cetirizine (ZYRTEC ALLERGY) 10 MG tablet Take 10 mg by mouth Daily.     • Cholecalciferol (VITAMIN D) 2000 units tablet Take 2,000 Units by mouth Daily.     • diphenhydrAMINE (BENADRYL) 25 mg capsule Take 25 mg by mouth Every 6 (Six) Hours As Needed for Itching.     • flunisolide (NASALIDE) 25 MCG/ACT (0.025%) solution nasal spray Inhale 2 sprays Every 12 (Twelve) Hours. 3 bottle 3   • FLUoxetine (PROzac) 20 MG capsule TAKE ONE CAPSULE BY MOUTH DAILY 90 capsule 1   • fluticasone (FLONASE) 50 MCG/ACT nasal spray 2 sprays into each nostril Daily.     • Fluticasone-Umeclidin-Vilant (TRELEGY ELLIPTA) 100-62.5-25 MCG/INH aerosol powder  Inhale.     • guaiFENesin (MUCINEX) 600 MG 12 hr tablet Take 1,200 mg by mouth 2 (Two) Times a Day.     • Mirabegron ER (MYRBETRIQ) 25 MG tablet sustained-release 24 hour 24 hr tablet Take 1 tablet by mouth Daily. 30 tablet 6   • montelukast (SINGULAIR) 10 MG tablet Take 1 tablet by mouth Every Night. 30 tablet 5   • " oxyCODONE-acetaminophen (PERCOCET) 7.5-325 MG per tablet Take 1 tablet by mouth Every 6 (Six) Hours As Needed.     • pseudoephedrine (SUDAFED) 30 MG tablet Take 30 mg by mouth Every 4 (Four) Hours As Needed for Congestion.     • HYDROcodone-acetaminophen (LORTAB) 7.5-325 MG per tablet Take 1 tablet by mouth Every 6 (Six) Hours As Needed for Moderate Pain .     • omeprazole (priLOSEC) 40 MG capsule Take 1 capsule by mouth Daily. 30 capsule 5   • Spacer/Aero Chamber Mouthpiece misc Use spacer with all inhaled treatments 1 each 1     Current Facility-Administered Medications   Medication Dose Route Frequency Provider Last Rate Last Dose   • lidocaine (XYLOCAINE) 1 % injection 0.5 mL  0.5 mL Intradermal Once PRN Dutch Zhu MD       • sodium chloride 0.9 % flush 1-10 mL  1-10 mL Intravenous PRN Dutch Zhu MD           --------------------------------------------------------------------------------  Assessment/Plan      Anesthesia Evaluation     NPO Solid Status: > 8 hours      Pain impairs ability to perform ADLs: Bathing, Dressing, Driving, Sleeping and Working  Modalities previously tried to control pain with limited effectiveness within the last 4-6 weeks: Ice, OTC medications and Heat     Airway   Mallampati: III  TM distance: >3 FB  Neck ROM: full  Possible difficult intubation  Dental - normal exam     Pulmonary - normal exam   (+) a smoker Current Smoked day of surgery, COPD moderate, asthma, sleep apnea,   (-) rhonchi  Cardiovascular   Exercise tolerance: poor (<4 METS)    ECG reviewed  Rhythm: regular    (+) hypertension, PVD, hyperlipidemia,   (-) murmur, carotid bruits    ROS comment: She has had a history of an aortobifem bypass  ECG shows sinus tachycardia with no ST changes.    Neuro/Psych  (+) numbness, abnormal reflex,  Sensory Deficit,    (-) left straight leg raise test, right straight leg raise test    PE Comment: I was unable to elicit any deep tendon reflexes in the lower  extremities.  She reports decreased sensation in the L4 and L5 dermatomes bilaterally  GI/Hepatic/Renal/Endo    (+)  GERD well controlled,      Musculoskeletal   normal range of motion    Abdominal   (+) obese,    Substance History      OB/GYN          Other   (+) arthritis                Diagnosis and Plan    Treatment Plan  ASA 3      Procedures: Lumbar Epidural Steroid Injection(LESI), With fluoroscopy,           She understands that lumbar epidural steroid injections are most helpful for subacute symptoms.  Any symptom relief could be fleeting but may be helpful for her to participate in physical therapy.  She understands she may continue to have chronic low back pain with radicular symptoms despite this treatment modality.    Diagnosis     * Lumbago [M54.5]     * Lumbar neuritis [M54.16]     * Degeneration of lumbar intervertebral disc [M51.36]     * Spondylosis of lumbar spine [M47.816]

## 2018-09-26 ENCOUNTER — HOSPITAL ENCOUNTER (OUTPATIENT)
Dept: PHYSICAL THERAPY | Facility: HOSPITAL | Age: 58
Setting detail: THERAPIES SERIES
Discharge: HOME | End: 2018-09-26

## 2018-09-26 DIAGNOSIS — R53.1 WEAKNESS GENERALIZED: ICD-10-CM

## 2018-09-26 DIAGNOSIS — M54.5 CHRONIC BILATERAL LOW BACK PAIN, WITH SCIATICA PRESENCE UNSPECIFIED: Primary | ICD-10-CM

## 2018-09-26 DIAGNOSIS — R29.3 POSTURE IMBALANCE: ICD-10-CM

## 2018-09-26 DIAGNOSIS — Z74.09 IMPAIRED MOBILITY AND ADLS: ICD-10-CM

## 2018-09-26 DIAGNOSIS — Z78.9 IMPAIRED MOBILITY AND ADLS: ICD-10-CM

## 2018-09-26 DIAGNOSIS — G89.29 CHRONIC BILATERAL LOW BACK PAIN WITH LEFT-SIDED SCIATICA: ICD-10-CM

## 2018-09-26 DIAGNOSIS — M54.42 CHRONIC BILATERAL LOW BACK PAIN WITH LEFT-SIDED SCIATICA: ICD-10-CM

## 2018-09-26 DIAGNOSIS — G89.29 CHRONIC BILATERAL LOW BACK PAIN, WITH SCIATICA PRESENCE UNSPECIFIED: Primary | ICD-10-CM

## 2018-09-26 PROCEDURE — 97113 AQUATIC THERAPY/EXERCISES: CPT | Performed by: PHYSICAL THERAPIST

## 2018-09-26 NOTE — THERAPY TREATMENT NOTE
Outpatient Physical Therapy Ortho Treatment Note  [facility]     Patient Name: Flaquita Cr  : 1960  MRN: [id number]  Today's Date: 2018      Visit Date: 2018    Visit Dx:    ICD-10-CM ICD-9-CM   1. Chronic bilateral low back pain, with sciatica presence unspecified M54.5 724.2    G89.29 338.29   2. Impaired mobility and ADLs Z74.09 799.89   3. Posture imbalance R29.3 729.90   4. Weakness generalized R53.1 780.79   5. Chronic bilateral low back pain with left-sided sciatica M54.42 724.2    G89.29 724.3     338.29       Patient Active Problem List   Diagnosis   • Arthritis of hand   • Reactive airway disease with acute exacerbation   • Secondary hypertension   • Chronic back pain   • Acne rosacea   • Chronic fatigue   • Overactive bladder   • Diverticulosis of large intestine without hemorrhage   • COPD (chronic obstructive pulmonary disease) (CMS/[facility])   • Asthma   • IBS (irritable bowel syndrome)   • Depression   • Current every day smoker   • TAI (obstructive sleep apnea)   • PAD (peripheral artery disease) (CMS/[facility])   • GERD (gastroesophageal reflux disease)   • AR (allergic rhinitis)   • Hyperglycemia   • Hyperlipidemia   • Vitamin D deficiency   • Osteopenia   • Cervical stenosis of spinal canal   • DDD (degenerative disc disease), lumbar   • Lumbar radiculopathy   • Bilateral hand numbness        Past Medical History:   Diagnosis Date   • Allergy     Allergies   • Arthritis     failed naproxena dn meloxicam - celebrex works well   • Asthma    • Circulation problem    • Colon polyp    • COPD (chronic obstructive pulmonary disease) (CMS/[facility])     does not use O2   • Depression    • Emphysema of lung (CMS/[facility])    • Fatigue    • Gastritis    • GERD (gastroesophageal reflux disease)    • Hyperlipidemia    • Hypertension     just started, no meds   • IBS (irritable bowel syndrome)    • OAB (overactive bladder)    • PAD (peripheral artery disease) (CMS/[facility])    • Peripheral arterial  disease (CMS/HCC)    • Right leg pain    • Sleep apnea         Past Surgical History:   Procedure Laterality Date   • AORTA FEMORAL BYPASS  2011   • COLONOSCOPY N/A 8/24/2017    NBIH, diverticulosis, four 5 to 6 mm polyps (one tubular adenoma)   • HYSTERECTOMY  1988    endometriosis   • LEG SURGERY Bilateral     for PAD   • ROOT CANAL      R upper jaw with rootcanal                             PT Assessment/Plan     Row Name 09/26/18 1150          PT Assessment    Assessment Comments Patient subjective reports of decreased pain on L side (where MAO performed last Friday) but increased pain on R side. States she almost cancelled today because the pain was so bad. Performed therapy session in deep water for decompression benefits. Performed AROM in patient comfort range. Decreased pain noted on R side at end of session.   -CK       User Key  (r) = Recorded By, (t) = Taken By, (c) = Cosigned By    Initials Name Provider Type    CK Onel Marin, PT Physical Therapist                    Exercises     Row Name 09/26/18 1000             Subjective Comments    Subjective Comments I had to come off the anti-inflammatories for almost a week before the injection. I was in so much pain I could barely function. I had the injection last Friday on the left side. It did work. My pain is only a@/10 on that side. But now the pain on the R side is intense. I dont know if it is because the L side feels better that I notice the R more  or something else. I go again for another injection on oct. 12th.   -CK         Subjective Pain    Able to rate subjective pain? yes  -CK      Subjective Pain Comment 2/10 on L 6/10 on R  -CK         Aquatics    89473 - Aquatic Therapy Minutes 45  -CK         Aquatics LE    Water Walk forward;side;backward   x3 with TA- deep water  -CK      Stretch 1 B calf stretch 2x 30  -CK      Stretch 2 B piriformis stretch 2 x 30  -CK      Vertical Traction 5 min  -CK      Clams suspended on noodle x 2 min  -CK       March in Place walking x 2 laps  -CK      Uni-Clock susp. tuck ups x 12  -CK      Bicycle suspended x2 min;  seated x 2o for warm up  -CK      Flutter/Scissor suspended skiing and jacks x 2 min each  -CK        User Key  (r) = Recorded By, (t) = Taken By, (c) = Cosigned By    Initials Name Provider Type    CK Onel Marin, PT Physical Therapist                                            Time Calculation:   Start Time: 1115  Stop Time: 1200  Time Calculation (min): 45 min  Total Timed Code Minutes- PT: 45 minute(s)  Therapy Suggested Charges     Code   Minutes Charges    95550 (CPT®) Hc Pt Neuromusc Re Education Ea 15 Min      99293 (CPT®) Hc Pt Ther Proc Ea 15 Min      01856 (CPT®) Hc Gait Training Ea 15 Min      92770 (CPT®) Hc Pt Therapeutic Act Ea 15 Min      68927 (CPT®) Hc Pt Manual Therapy Ea 15 Min      94295 (CPT®) Hc Pt Ther Massage- Per 15 Min      09469 (CPT®) Hc Pt Iontophoresis Ea 15 Min      28777 (CPT®) Hc Pt Elec Stim Ea-Per 15 Min      04870 (CPT®) Hc Pt Ultrasound Ea 15 Min      62485 (CPT®) Hc Pt Self Care/Mgmt/Train Ea 15 Min      52559 (CPT®) Hc Pt Prosthetic (S) Train Initial Encounter, Each 15 Min      53978 (CPT®) Hc Orthotic(S) Mgmt/Train Initial Encounter, Each 15min      75506 (CPT®) Hc Pt Aquatic Therapy Ea 15 Min 45 3    53087 (CPT®) Hc Pt Orthotic(S)/Prosthetic(S) Encounter, Each 15 Min      Total  45 3        Therapy Charges for Today     Code Description Service Date Service Provider Modifiers Qty    04350849484 HC PT AQUATIC THERAPY EA 15 MIN 9/26/2018 Onel Marin, PT GP 3                    Onel Marin PT  9/26/2018

## 2018-10-03 ENCOUNTER — APPOINTMENT (OUTPATIENT)
Dept: PHYSICAL THERAPY | Facility: HOSPITAL | Age: 58
End: 2018-10-03

## 2018-10-12 ENCOUNTER — ANESTHESIA (OUTPATIENT)
Dept: PAIN MEDICINE | Facility: HOSPITAL | Age: 58
End: 2018-10-12

## 2018-10-12 ENCOUNTER — HOSPITAL ENCOUNTER (OUTPATIENT)
Dept: PAIN MEDICINE | Facility: HOSPITAL | Age: 58
Discharge: HOME | End: 2018-10-12
Admitting: ANESTHESIOLOGY

## 2018-10-12 ENCOUNTER — ANESTHESIA EVENT (OUTPATIENT)
Dept: PAIN MEDICINE | Facility: HOSPITAL | Age: 58
End: 2018-10-12

## 2018-10-12 ENCOUNTER — HOSPITAL ENCOUNTER (OUTPATIENT)
Dept: GENERAL RADIOLOGY | Facility: HOSPITAL | Age: 58
Discharge: HOME | End: 2018-10-12

## 2018-10-12 VITALS
DIASTOLIC BLOOD PRESSURE: 79 MMHG | OXYGEN SATURATION: 98 % | SYSTOLIC BLOOD PRESSURE: 128 MMHG | HEART RATE: 79 BPM | RESPIRATION RATE: 16 BRPM | TEMPERATURE: 97.7 F

## 2018-10-12 DIAGNOSIS — M54.16 LUMBAR RADICULOPATHY: ICD-10-CM

## 2018-10-12 DIAGNOSIS — M51.36 DDD (DEGENERATIVE DISC DISEASE), LUMBAR: ICD-10-CM

## 2018-10-12 DIAGNOSIS — R52 PAIN: ICD-10-CM

## 2018-10-12 PROCEDURE — C1755 CATHETER, INTRASPINAL: HCPCS

## 2018-10-12 PROCEDURE — 25010000002 MIDAZOLAM PER 1 MG: Performed by: ANESTHESIOLOGY

## 2018-10-12 PROCEDURE — 77003 FLUOROGUIDE FOR SPINE INJECT: CPT

## 2018-10-12 PROCEDURE — 25010000002 METHYLPREDNISOLONE PER 80 MG: Performed by: ANESTHESIOLOGY

## 2018-10-12 RX ORDER — FENTANYL CITRATE 50 UG/ML
50 INJECTION, SOLUTION INTRAMUSCULAR; INTRAVENOUS AS NEEDED
Status: DISCONTINUED | OUTPATIENT
Start: 2018-10-12 | End: 2018-10-13 | Stop reason: HOSPADM

## 2018-10-12 RX ORDER — SODIUM CHLORIDE 0.9 % (FLUSH) 0.9 %
3 SYRINGE (ML) INJECTION EVERY 12 HOURS SCHEDULED
Status: DISCONTINUED | OUTPATIENT
Start: 2018-10-12 | End: 2018-10-13 | Stop reason: HOSPADM

## 2018-10-12 RX ORDER — METHYLPREDNISOLONE ACETATE 80 MG/ML
80 INJECTION, SUSPENSION INTRA-ARTICULAR; INTRALESIONAL; INTRAMUSCULAR; SOFT TISSUE ONCE
Status: COMPLETED | OUTPATIENT
Start: 2018-10-12 | End: 2018-10-12

## 2018-10-12 RX ORDER — SODIUM CHLORIDE 0.9 % (FLUSH) 0.9 %
3-10 SYRINGE (ML) INJECTION AS NEEDED
Status: DISCONTINUED | OUTPATIENT
Start: 2018-10-12 | End: 2018-10-13 | Stop reason: HOSPADM

## 2018-10-12 RX ORDER — MIDAZOLAM HYDROCHLORIDE 1 MG/ML
1 INJECTION INTRAMUSCULAR; INTRAVENOUS AS NEEDED
Status: DISCONTINUED | OUTPATIENT
Start: 2018-10-12 | End: 2018-10-13 | Stop reason: HOSPADM

## 2018-10-12 RX ORDER — SODIUM CHLORIDE 0.9 % (FLUSH) 0.9 %
1-10 SYRINGE (ML) INJECTION AS NEEDED
Status: DISCONTINUED | OUTPATIENT
Start: 2018-10-12 | End: 2018-10-13 | Stop reason: HOSPADM

## 2018-10-12 RX ORDER — LIDOCAINE HYDROCHLORIDE 10 MG/ML
1 INJECTION, SOLUTION INFILTRATION; PERINEURAL ONCE AS NEEDED
Status: DISCONTINUED | OUTPATIENT
Start: 2018-10-12 | End: 2018-10-13 | Stop reason: HOSPADM

## 2018-10-12 RX ADMIN — METHYLPREDNISOLONE ACETATE 80 MG: 80 INJECTION, SUSPENSION INTRA-ARTICULAR; INTRALESIONAL; INTRAMUSCULAR; SOFT TISSUE at 15:05

## 2018-10-12 RX ADMIN — MIDAZOLAM HYDROCHLORIDE 1 MG: 2 INJECTION, SOLUTION INTRAMUSCULAR; INTRAVENOUS at 15:02

## 2018-10-12 NOTE — ANESTHESIA PROCEDURE NOTES
PAIN Epidural block      Patient reassessed immediately prior to procedure    Patient location during procedure: pain clinic  Start Time: 10/12/2018 2:54 PM  Stop Time: 10/12/2018 3:00 PM  Indication:procedure for pain  Performed By  Anesthesiologist: YOUSIF COOPER  Preanesthetic Checklist  Completed: patient identified, site marked, surgical consent, pre-op evaluation, timeout performed, IV checked, risks and benefits discussed and monitors and equipment checked  Additional Notes  LDDD/LRAD  Prep:  Pt Position:prone  Sterile Tech:cap, gloves and sterile barrier  Prep:chlorhexidine gluconate and isopropyl alcohol  Monitoring:EKG, continuous pulse oximetry and blood pressure monitoring  Procedure:  Sedation: yes   Approach:midline  Guidance: fluoroscopy  Location:lumbar  Level:5-6  Needle Type:Tuohy  Needle Gauge:20  Aspiration:negative  Medications:  Depomedrol:80  Preservative Free Saline:3mL  Isovue:2mL    Post Assessment:  Dressing:secured with tape  Pt Tolerance:patient tolerated the procedure well with no apparent complications  Complications:no

## 2018-10-12 NOTE — INTERVAL H&P NOTE
[facility]  H&P reviewed. No changes since last visit.  Patient states   25-50% improvement since the last procedure/injection.  Pain improved to 2-5/10, radiate to both sides.    Diagnosis     * DDD (degenerative disc disease), lumbar [M51.36]     * Lumbar radiculopathy [M54.16]      Airway assessed since last visit. Airway class equals: 2.

## 2018-10-12 NOTE — H&P (VIEW-ONLY)
[facility]    History and Physical    Patient Name: Flaquita Cr  :  1960  MRN:  [id number]  Date of Admission: 2018    Subjective     Patient is a 58 y.o. female presents with chief complaint of chronic low back, hips: bilateral and buttock pain.  Onset of symptoms was gradual starting several years ago.  Symptoms are associated/aggravated by activity. Symptoms improve with nothing    The following portions of the patients history were reviewed and updated as appropriate: current medications, allergies, past medical history, past surgical history, past family history, past social history and problem list     She's had pain for years that general is getting worse.  She is has pain in her lumbar region which radiates to both of her hips and down the lateral aspect of both of her legs.  She states that her great toe on the left is getting very numb she denies any obvious weakness.  She says she is unable to ambulate for any distance mainly due to pain.  She denies any falls.  She's done physical therapy with minimal relief of her pain she does take nonsteroidal anti-inflammatories which she things are somewhat helpful.  She generates her pain between a 3 and a 7 out of 10.  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      MRI report:  No significant change when compared to prior MRI of the lumbar spine  from [facility] on 2015. There is mild bilateral  facet overgrowth L4-S1, mild disc space narrowing and disc desiccation  at L5-S1 with a posterior annular tear and a broad-based posterior  central disc bulge or small disc protrusion that does not appear to abut  the traversing S1 nerve roots and does not result in any canal or  lateral recess narrowing. The remainder of the lumbar spine MRI is  normal.                Objective     Past Medical History:   Past Medical History:   Diagnosis  Date   • Allergy     Allergies   • Arthritis     failed naproxena dn meloxicam - celebrex works well   • Asthma    • Circulation problem    • Colon polyp    • COPD (chronic obstructive pulmonary disease) (CMS/HCC)     does not use O2   • Depression    • Emphysema of lung (CMS/HCC)    • Fatigue    • Gastritis    • GERD (gastroesophageal reflux disease)    • Hyperlipidemia    • Hypertension     just started, no meds   • IBS (irritable bowel syndrome)    • OAB (overactive bladder)    • PAD (peripheral artery disease) (CMS/HCC)    • Peripheral arterial disease (CMS/HCC)    • Right leg pain    • Sleep apnea      Past Surgical History:   Past Surgical History:   Procedure Laterality Date   • AORTA FEMORAL BYPASS  2011   • COLONOSCOPY N/A 8/24/2017    NBIH, diverticulosis, four 5 to 6 mm polyps (one tubular adenoma)   • HYSTERECTOMY  1988    endometriosis   • LEG SURGERY Bilateral     for PAD   • ROOT CANAL      R upper jaw with rootcanal     Family History:   Family History   Problem Relation Age of Onset   • Osteoporosis Mother    • Atrial fibrillation Mother         pacemaker   • Arthritis Mother    • Alzheimer's disease Mother    • Macular degeneration Father    • Cancer Father    • Arthritis Sister    • Gout Brother    • Arthritis Brother    • Colon cancer Maternal Aunt    • Uterine cancer Maternal Aunt    • Breast cancer Neg Hx      Social History:   Social History   Substance Use Topics   • Smoking status: Current Every Day Smoker     Years: 0.20     Types: Cigarettes, Electronic Cigarette   • Smokeless tobacco: Never Used      Comment: Began smoking at age 10.  Smoked 1 ppd for 15 years, 2 ppd for 18 years, and 2.5 ppd for 15 years for an 88.5 pack year history.  Currently smoking 4 tobacco cigarettes daily with frequent use of an e-cigarette.  Using e-cig since 2010.   • Alcohol use Yes      Comment: once per year, holidays       Vital Signs Range for the last 24 hours  Temperature:     Temp Source:     BP: BP:  "(120)/(80) 120/80   Pulse: Heart Rate:  [108] 108   Respirations: Resp:  [16] 16   SPO2: SpO2:  [95 %] 95 %   O2 Amount (l/min):     O2 Devices Device (Oxygen Therapy): room air   Weight: Weight:  [90.3 kg (199 lb)] 90.3 kg (199 lb)     Flowsheet Rows      First Filed Value   Admission Height  162.6 cm (64\") Documented at 09/21/2018 1147   Admission Weight  90.3 kg (199 lb) Documented at 09/21/2018 1147          --------------------------------------------------------------------------------    Current Outpatient Prescriptions   Medication Sig Dispense Refill   • Acetaminophen (TYLENOL ARTHRITIS PAIN PO) Take  by mouth.     • albuterol (PROVENTIL HFA;VENTOLIN HFA) 108 (90 Base) MCG/ACT inhaler Inhale 2 puffs Every 4 (Four) Hours As Needed for Wheezing or Shortness of Air. 18 g 1   • Bepotastine Besilate 1.5 % solution Apply  to eye(s) as directed by provider.     • celecoxib (CELEBREX) 200 MG capsule Take 1 capsule by mouth 2 (Two) Times a Day. 60 capsule 5   • cetirizine (ZYRTEC ALLERGY) 10 MG tablet Take 10 mg by mouth Daily.     • Cholecalciferol (VITAMIN D) 2000 units tablet Take 2,000 Units by mouth Daily.     • diphenhydrAMINE (BENADRYL) 25 mg capsule Take 25 mg by mouth Every 6 (Six) Hours As Needed for Itching.     • flunisolide (NASALIDE) 25 MCG/ACT (0.025%) solution nasal spray Inhale 2 sprays Every 12 (Twelve) Hours. 3 bottle 3   • FLUoxetine (PROzac) 20 MG capsule TAKE ONE CAPSULE BY MOUTH DAILY 90 capsule 1   • fluticasone (FLONASE) 50 MCG/ACT nasal spray 2 sprays into each nostril Daily.     • Fluticasone-Umeclidin-Vilant (TRELEGY ELLIPTA) 100-62.5-25 MCG/INH aerosol powder  Inhale.     • guaiFENesin (MUCINEX) 600 MG 12 hr tablet Take 1,200 mg by mouth 2 (Two) Times a Day.     • Mirabegron ER (MYRBETRIQ) 25 MG tablet sustained-release 24 hour 24 hr tablet Take 1 tablet by mouth Daily. 30 tablet 6   • montelukast (SINGULAIR) 10 MG tablet Take 1 tablet by mouth Every Night. 30 tablet 5   • " oxyCODONE-acetaminophen (PERCOCET) 7.5-325 MG per tablet Take 1 tablet by mouth Every 6 (Six) Hours As Needed.     • pseudoephedrine (SUDAFED) 30 MG tablet Take 30 mg by mouth Every 4 (Four) Hours As Needed for Congestion.     • HYDROcodone-acetaminophen (LORTAB) 7.5-325 MG per tablet Take 1 tablet by mouth Every 6 (Six) Hours As Needed for Moderate Pain .     • omeprazole (priLOSEC) 40 MG capsule Take 1 capsule by mouth Daily. 30 capsule 5   • Spacer/Aero Chamber Mouthpiece misc Use spacer with all inhaled treatments 1 each 1     Current Facility-Administered Medications   Medication Dose Route Frequency Provider Last Rate Last Dose   • lidocaine (XYLOCAINE) 1 % injection 0.5 mL  0.5 mL Intradermal Once PRN Dutch Zhu MD       • sodium chloride 0.9 % flush 1-10 mL  1-10 mL Intravenous PRN Dutch Zhu MD           --------------------------------------------------------------------------------  Assessment/Plan      Anesthesia Evaluation     NPO Solid Status: > 8 hours      Pain impairs ability to perform ADLs: Bathing, Dressing, Driving, Sleeping and Working  Modalities previously tried to control pain with limited effectiveness within the last 4-6 weeks: Ice, OTC medications and Heat     Airway   Mallampati: III  TM distance: >3 FB  Neck ROM: full  Possible difficult intubation  Dental - normal exam     Pulmonary - normal exam   (+) a smoker Current Smoked day of surgery, COPD moderate, asthma, sleep apnea,   (-) rhonchi  Cardiovascular   Exercise tolerance: poor (<4 METS)    ECG reviewed  Rhythm: regular    (+) hypertension, PVD, hyperlipidemia,   (-) murmur, carotid bruits    ROS comment: She has had a history of an aortobifem bypass  ECG shows sinus tachycardia with no ST changes.    Neuro/Psych  (+) numbness, abnormal reflex,  Sensory Deficit,    (-) left straight leg raise test, right straight leg raise test    PE Comment: I was unable to elicit any deep tendon reflexes in the lower  extremities.  She reports decreased sensation in the L4 and L5 dermatomes bilaterally  GI/Hepatic/Renal/Endo    (+)  GERD well controlled,      Musculoskeletal   normal range of motion    Abdominal   (+) obese,    Substance History      OB/GYN          Other   (+) arthritis                Diagnosis and Plan    Treatment Plan  ASA 3      Procedures: Lumbar Epidural Steroid Injection(LESI), With fluoroscopy,           She understands that lumbar epidural steroid injections are most helpful for subacute symptoms.  Any symptom relief could be fleeting but may be helpful for her to participate in physical therapy.  She understands she may continue to have chronic low back pain with radicular symptoms despite this treatment modality.    Diagnosis     * Lumbago [M54.5]     * Lumbar neuritis [M54.16]     * Degeneration of lumbar intervertebral disc [M51.36]     * Spondylosis of lumbar spine [M47.816]

## 2018-10-17 ENCOUNTER — APPOINTMENT (OUTPATIENT)
Dept: PHYSICAL THERAPY | Facility: HOSPITAL | Age: 58
End: 2018-10-17

## 2018-10-24 ENCOUNTER — HOSPITAL ENCOUNTER (OUTPATIENT)
Dept: PHYSICAL THERAPY | Facility: HOSPITAL | Age: 58
Setting detail: THERAPIES SERIES
Discharge: HOME | End: 2018-10-24

## 2018-10-24 DIAGNOSIS — Z78.9 IMPAIRED MOBILITY AND ADLS: ICD-10-CM

## 2018-10-24 DIAGNOSIS — R53.1 WEAKNESS GENERALIZED: ICD-10-CM

## 2018-10-24 DIAGNOSIS — G89.29 CHRONIC BILATERAL LOW BACK PAIN, WITH SCIATICA PRESENCE UNSPECIFIED: Primary | ICD-10-CM

## 2018-10-24 DIAGNOSIS — R29.3 POSTURE IMBALANCE: ICD-10-CM

## 2018-10-24 DIAGNOSIS — M54.5 CHRONIC BILATERAL LOW BACK PAIN, WITH SCIATICA PRESENCE UNSPECIFIED: Primary | ICD-10-CM

## 2018-10-24 DIAGNOSIS — Z74.09 IMPAIRED MOBILITY AND ADLS: ICD-10-CM

## 2018-10-24 PROCEDURE — 97113 AQUATIC THERAPY/EXERCISES: CPT | Performed by: PHYSICAL THERAPIST

## 2018-10-25 NOTE — THERAPY TREATMENT NOTE
Outpatient Physical Therapy Ortho Progress Note  [facility]     Patient Name: Flaquita Cr  : 1960  MRN: [id number]  Today's Date: 10/24/2018      Visit Date: 10/24/2018    Visit Dx:    ICD-10-CM ICD-9-CM   1. Chronic bilateral low back pain, with sciatica presence unspecified M54.5 724.2    G89.29 338.29   2. Impaired mobility and ADLs Z74.09 799.89   3. Posture imbalance R29.3 729.90   4. Weakness generalized R53.1 780.79       Patient Active Problem List   Diagnosis   • Arthritis of hand   • Reactive airway disease with acute exacerbation   • Secondary hypertension   • Chronic back pain   • Acne rosacea   • Chronic fatigue   • Overactive bladder   • Diverticulosis of large intestine without hemorrhage   • COPD (chronic obstructive pulmonary disease) (CMS/[facility])   • Asthma   • IBS (irritable bowel syndrome)   • Depression   • Current every day smoker   • TAI (obstructive sleep apnea)   • PAD (peripheral artery disease) (CMS/[facility])   • GERD (gastroesophageal reflux disease)   • AR (allergic rhinitis)   • Hyperglycemia   • Hyperlipidemia   • Vitamin D deficiency   • Osteopenia   • Cervical stenosis of spinal canal   • DDD (degenerative disc disease), lumbar   • Lumbar radiculopathy   • Bilateral hand numbness        Past Medical History:   Diagnosis Date   • Allergy     Allergies   • Arthritis     failed naproxena dn meloxicam - celebrex works well   • Asthma    • Circulation problem    • Colon polyp    • COPD (chronic obstructive pulmonary disease) (CMS/[facility])     does not use O2   • Depression    • Emphysema of lung (CMS/[facility])    • Fatigue    • Gastritis    • GERD (gastroesophageal reflux disease)    • Hyperlipidemia    • Hypertension     just started, no meds   • IBS (irritable bowel syndrome)    • Low back pain    • OAB (overactive bladder)    • PAD (peripheral artery disease) (CMS/[facility])    • Peripheral arterial disease (CMS/[facility])    • Right leg pain    • Sleep apnea         Past Surgical History:    Procedure Laterality Date   • AORTA FEMORAL BYPASS  2011   • COLONOSCOPY N/A 8/24/2017    NBIH, diverticulosis, four 5 to 6 mm polyps (one tubular adenoma)   • EPIDURAL BLOCK     • HYSTERECTOMY  1988    endometriosis   • LEG SURGERY Bilateral     for PAD   • ROOT CANAL      R upper jaw with rootcanal                             PT Assessment/Plan     Row Name 10/24/18 1906          PT Assessment    Assessment Comments Ms. Cr returns today to resume aquatic therapy after 4 week absence from therapy.  In the interim, she had 2 MAO with some benefit reported.  She reports she is to have 3rd MAO but it has not yet been scheduled.  She states she is walking more normal but continues to have limited ability to stand/walk for extended duration.  She demonstrates improving awareness and understanding of importance of core activation with ex/activity but remains weak.  She had progressed to using resistance (ankle weights with LE ex and L3 paddles with UE/core work) but due to absence from therapy, resistance was omitted or reduced with performance of ex this session.  Her self-scored LEROY improved from 52% to 48% for lumbar dysfunction.  She remains appropriate for continued aquatic services for progression of strengthening program to address hip/core weakness and ease pain with all daily functional mobility tasks.  -RA        PT Plan    PT Plan Comments Continue skilled aquatic therapy working towards independent self management.  -RA       User Key  (r) = Recorded By, (t) = Taken By, (c) = Cosigned By    Initials Name Provider Type    Kristan Momin, PT Physical Therapist                    Exercises     Row Name 10/24/18 1100             Subjective Comments    Subjective Comments Not too bad today but haven’t done much today.  Have had 2 MAO,  !st seemed to help with L sided pain, but hurt worse for about a week after 2nd one.  Supposed to have 3rd one but hasn’t been scheduled yet.    -RA         Subjective  Pain    Able to rate subjective pain? yes  -RA      Pre-Treatment Pain Level 2  -RA         Aquatics    15577 - Aquatic Therapy Minutes 50  -RA         Aquatics LE    Water Walk forward;side;backward   2-3 laps ea, TA activation  -RA      Stretch 1 B calf stretch 2x 30  -RA      Stretch 2 B piriformis stretch 2 x 30  -RA      Stretch 3 B HS/adductor stretch w SN x 15  -RA      Vertical Traction 5 min  -RA      Abdominals noodle   rectus/obliques, LN x 12 ea  -RA      Clams suspended on noodle x 2 min  -RA      Hip Abd/Add B 15x  -RA      Hip Flex/Ext HS curls x 15 B  -RA      March in Place walking x 2 laps  -RA      Toe/Heel Raises tip toe/tandem walk x 2 laps  -RA      Uni-Squat leg press x 15 B blue ring  -RA      Uni-Clock susp. tuck ups x 12  -RA      Step Ups B hip circles cw/ccw 10/10  -RA      Bicycle suspended x2 min;  seated x 2min for warm up  -RA         Exercise 3    Exercise Name 3 BUE work: shldr abd/add, flex/ext, IR/ER, push/pull, L1 paddles   -RA      Reps 3 15 each  -RA        User Key  (r) = Recorded By, (t) = Taken By, (c) = Cosigned By    Initials Name Provider Type    Kristan Momin, PT Physical Therapist                               PT OP Goals     Row Name 10/24/18 1900          Long Term Goals    LTG 1 Reduce pain to 3/10 with water exercise and more centralized (from lateral hips).   -RA     LTG 1 Progress Ongoing  -RA     LTG 1 Progress Comments 2/10 at start, 4/10 at end of session  -RA     LTG 2 Pt to sit 5 min without shifting due to increased pain (on eval shifted every few minutes, sits on leg in chair each side.)   -RA     LTG 2 Progress Met  -RA     LTG 3 5x sit to stand from 26.28 seconds with hands to 20 seconds no hands. (use hands on eval and was short of air by end)   -RA     LTG 3 Progress Partially Met  -RA     LTG 3 Progress Comments 19.88 sec   -RA     LTG 4 Modified Oswestry perceived disability from 60% on eval to 42% on eval.   -     LTG 4 Progress Ongoing  -RA      LTG 4 Progress Comments 48% with reassessment  -RA     LTG 5 Pt independent with self management of condition with water exercise.  Pt plans membership to [employer] fitness pool and also to use inversion table at home.    -RA     LTG 5 Progress Ongoing  -RA     LTG 5 Progress Comments continues to require cuing with aquatic therapy ex.  Other methods of self management untested   -RA     LTG 6 patient will be able to walk grocery store or [employer] without seated rest break and minimal increase in pain level for improved community mobility  -RA     LTG 6 Progress Ongoing  -RA     LTG 6 Progress Comments continues to be limited with shopping and depends on cart for support  -RA       User Key  (r) = Recorded By, (t) = Taken By, (c) = Cosigned By    Initials Name Provider Type    Kristan Momin, PT Physical Therapist          Therapy Education  Given: Symptoms/condition management, Posture/body mechanics  Program: Reinforced  How Provided: Verbal, Demonstration  Provided to: Patient  Level of Understanding: Teach back education performed, Verbalized    Outcome Measure Options: 5x Sit to Stand, Modifed Owestry  Modified Oswestry  Modified Oswestry Score/Comments: LEROY score = 48%, STS 19.88 sec      Time Calculation:   Start Time: 1115  Stop Time: 1205  Time Calculation (min): 50 min  Therapy Suggested Charges     Code   Minutes Charges    86916 (CPT®) Hc Pt Neuromusc Re Education Ea 15 Min      68214 (CPT®) Hc Pt Ther Proc Ea 15 Min      11321 (CPT®) Hc Gait Training Ea 15 Min      61192 (CPT®) Hc Pt Therapeutic Act Ea 15 Min      13785 (CPT®) Hc Pt Manual Therapy Ea 15 Min      57224 (CPT®) Hc Pt Ther Massage- Per 15 Min      21504 (CPT®) Hc Pt Iontophoresis Ea 15 Min      11105 (CPT®) Hc Pt Elec Stim Ea-Per 15 Min      51222 (CPT®) Hc Pt Ultrasound Ea 15 Min      29391 (CPT®) Hc Pt Self Care/Mgmt/Train Ea 15 Min      54135 (CPT®)  Pt Prosthetic (S) Train Initial Encounter, Each 15 Min      39001 (CPT®)   Orthotic(S) Mgmt/Train Initial Encounter, Each 15min      37175 (CPT®) Hc Pt Aquatic Therapy Ea 15 Min 50 3    16085 (CPT®) Hc Pt Orthotic(S)/Prosthetic(S) Encounter, Each 15 Min       (CPT®) Hc Pt Electrical Stim Unattended      Total  50 3        Therapy Charges for Today     Code Description Service Date Service Provider Modifiers Qty    41396456702 HC PT AQUATIC THERAPY EA 15 MIN 10/24/2018 Kristan Snyder, PT GP 3          PT G-Codes  Outcome Measure Options: 5x Sit to Stand, Modifed Owestry  Modified Oswestry Score/Comments: LEROY score = 48%, STS 19.88 sec         Kristan Snyder, PT  10/24/2018

## 2018-11-01 ENCOUNTER — HOSPITAL ENCOUNTER (OUTPATIENT)
Dept: PHYSICAL THERAPY | Facility: HOSPITAL | Age: 58
Setting detail: THERAPIES SERIES
End: 2018-11-01

## 2018-11-07 ENCOUNTER — HOSPITAL ENCOUNTER (OUTPATIENT)
Dept: PHYSICAL THERAPY | Facility: HOSPITAL | Age: 58
Setting detail: THERAPIES SERIES
Discharge: HOME | End: 2018-11-07

## 2018-11-07 DIAGNOSIS — G89.29 CHRONIC BILATERAL LOW BACK PAIN, WITH SCIATICA PRESENCE UNSPECIFIED: Primary | ICD-10-CM

## 2018-11-07 DIAGNOSIS — Z74.09 IMPAIRED MOBILITY AND ADLS: ICD-10-CM

## 2018-11-07 DIAGNOSIS — Z78.9 IMPAIRED MOBILITY AND ADLS: ICD-10-CM

## 2018-11-07 DIAGNOSIS — M54.42 CHRONIC BILATERAL LOW BACK PAIN WITH LEFT-SIDED SCIATICA: ICD-10-CM

## 2018-11-07 DIAGNOSIS — R53.1 WEAKNESS GENERALIZED: ICD-10-CM

## 2018-11-07 DIAGNOSIS — G89.29 CHRONIC BILATERAL LOW BACK PAIN WITH LEFT-SIDED SCIATICA: ICD-10-CM

## 2018-11-07 DIAGNOSIS — M54.5 CHRONIC BILATERAL LOW BACK PAIN, WITH SCIATICA PRESENCE UNSPECIFIED: Primary | ICD-10-CM

## 2018-11-07 DIAGNOSIS — R29.3 POSTURE IMBALANCE: ICD-10-CM

## 2018-11-07 PROCEDURE — 97113 AQUATIC THERAPY/EXERCISES: CPT | Performed by: PHYSICAL THERAPIST

## 2018-11-07 NOTE — THERAPY TREATMENT NOTE
Outpatient Physical Therapy Ortho Treatment Note  [facility]     Patient Name: Flaquita Cr  : 1960  MRN: [id number]  Today's Date: 2018      Visit Date: 2018    Visit Dx:    ICD-10-CM ICD-9-CM   1. Chronic bilateral low back pain, with sciatica presence unspecified M54.5 724.2    G89.29 338.29   2. Impaired mobility and ADLs Z74.09 799.89   3. Posture imbalance R29.3 729.90   4. Weakness generalized R53.1 780.79   5. Chronic bilateral low back pain with left-sided sciatica M54.42 724.2    G89.29 724.3     338.29       Patient Active Problem List   Diagnosis   • Arthritis of hand   • Reactive airway disease with acute exacerbation   • Secondary hypertension   • Chronic back pain   • Acne rosacea   • Chronic fatigue   • Overactive bladder   • Diverticulosis of large intestine without hemorrhage   • COPD (chronic obstructive pulmonary disease) (CMS/[facility])   • Asthma   • IBS (irritable bowel syndrome)   • Depression   • Current every day smoker   • TAI (obstructive sleep apnea)   • PAD (peripheral artery disease) (CMS/[facility])   • GERD (gastroesophageal reflux disease)   • AR (allergic rhinitis)   • Hyperglycemia   • Hyperlipidemia   • Vitamin D deficiency   • Osteopenia   • Cervical stenosis of spinal canal   • DDD (degenerative disc disease), lumbar   • Lumbar radiculopathy   • Bilateral hand numbness        Past Medical History:   Diagnosis Date   • Allergy     Allergies   • Arthritis     failed naproxena dn meloxicam - celebrex works well   • Asthma    • Circulation problem    • Colon polyp    • COPD (chronic obstructive pulmonary disease) (CMS/[facility])     does not use O2   • Depression    • Emphysema of lung (CMS/[facility])    • Fatigue    • Gastritis    • GERD (gastroesophageal reflux disease)    • Hyperlipidemia    • Hypertension     just started, no meds   • IBS (irritable bowel syndrome)    • Low back pain    • OAB (overactive bladder)    • PAD (peripheral artery disease) (CMS/[facility])    •  Peripheral arterial disease (CMS/HCC)    • Right leg pain    • Sleep apnea         Past Surgical History:   Procedure Laterality Date   • AORTA FEMORAL BYPASS  2011   • COLONOSCOPY N/A 8/24/2017    NBIH, diverticulosis, four 5 to 6 mm polyps (one tubular adenoma)   • EPIDURAL BLOCK     • HYSTERECTOMY  1988    endometriosis   • LEG SURGERY Bilateral     for PAD   • ROOT CANAL      R upper jaw with rootcanal                             PT Assessment/Plan     Row Name 11/07/18 1319          PT Assessment    Assessment Comments Ms. Cr continues to report decrease in pain s/p MAO. She has plans for one more injection to complete series of 3 on Nov. 16th. With decreased pain plan is to work on progressing core stabilization program. Limitation at this time is frequency and consistency of visits coming only on Wednesdays with week long gaps (sometimes more) in between.   -CK       User Key  (r) = Recorded By, (t) = Taken By, (c) = Cosigned By    Initials Name Provider Type    CK Onel Marin S, PT Physical Therapist                    Exercises     Row Name 11/07/18 1200             Subjective Comments    Subjective Comments I do think the epidurals helped. I have another one (the last one) scheduled for Nov. 16th  -CK         Subjective Pain    Able to rate subjective pain? yes  -CK      Pre-Treatment Pain Level 2  -CK         Aquatics    81374 - Aquatic Therapy Minutes 50  -CK         Aquatics LE    Water Walk forward;side;backward   3 laps ea, TA activation  -CK      Stretch 1 B calf stretch 2x 30  -CK      Stretch 2 B piriformis stretch 2 x 30  -CK      Stretch 3 B HS/adductor stretch w LN x 15  -CK      Vertical Traction 5 min  -CK      Abdominals noodle   rectus/obliques, LN x 12 ea  -CK      Clams suspended on noodle x 2 min  -CK      Hip Abd/Add B 15x  -CK      Hip Flex/Ext HS curls x 15 B  -CK      March in Place walking x 2 laps  -CK      Toe/Heel Raises tip toe/tandem walk x 2 laps  -CK      Uni-Squat leg  press x 15 B blue ring  -CK      Uni-Clock susp. tuck ups x 12  -CK      Step Ups B hip circles cw/ccw 10/10  -CK      Bicycle suspended x2 min;  seated x 2min for warm up  -CK         Aquatics UE    Stretch 3 shldr abd/add x 15 blue bouys  -CK      Stretch Other 1 Shldr flex/ext x 15   -CK      Scap Retraction/Row x15, L3 paddles  -CK      External Rotation @ 0 x15, L3 paddles  -CK         Exercise 1    Exercise Name 1 hot tub x 5 min independently after completion of session to decrease soft tissue tension   -CK         Exercise 3    Exercise Name 3 BUE work: shldr abd/add, flex/ext, IR/ER, push/pull, L1 paddles   -CK      Reps 3 15 each  -CK        User Key  (r) = Recorded By, (t) = Taken By, (c) = Cosigned By    Initials Name Provider Type    CK Onel Marin S, PT Physical Therapist                                            Time Calculation:   Start Time: 1115  Stop Time: 1205  Time Calculation (min): 50 min  Total Timed Code Minutes- PT: 50 minute(s)  Therapy Suggested Charges     Code   Minutes Charges    61959 (CPT®) Hc Pt Neuromusc Re Education Ea 15 Min      56952 (CPT®) Hc Pt Ther Proc Ea 15 Min      40185 (CPT®) Hc Gait Training Ea 15 Min      28853 (CPT®) Hc Pt Therapeutic Act Ea 15 Min      32417 (CPT®) Hc Pt Manual Therapy Ea 15 Min      27883 (CPT®) Hc Pt Ther Massage- Per 15 Min      73396 (CPT®) Hc Pt Iontophoresis Ea 15 Min      42158 (CPT®) Hc Pt Elec Stim Ea-Per 15 Min      95637 (CPT®) Hc Pt Ultrasound Ea 15 Min      65321 (CPT®) Hc Pt Self Care/Mgmt/Train Ea 15 Min      08759 (CPT®) Hc Pt Prosthetic (S) Train Initial Encounter, Each 15 Min      48407 (CPT®) Hc Orthotic(S) Mgmt/Train Initial Encounter, Each 15min      23104 (CPT®) Hc Pt Aquatic Therapy Ea 15 Min 50 3    77257 (CPT®) Hc Pt Orthotic(S)/Prosthetic(S) Encounter, Each 15 Min       (CPT®) Hc Pt Electrical Stim Unattended      Total  50 3        Therapy Charges for Today     Code Description Service Date Service Provider  Modifiers Qty    22046998531 HC PT AQUATIC THERAPY EA 15 MIN 11/7/2018 Onel Marin, PT GP 3                    Onel PATEL. Tania, PT  11/7/2018

## 2018-11-14 ENCOUNTER — HOSPITAL ENCOUNTER (OUTPATIENT)
Dept: PHYSICAL THERAPY | Facility: HOSPITAL | Age: 58
Setting detail: THERAPIES SERIES
End: 2018-11-14

## 2018-11-16 ENCOUNTER — APPOINTMENT (OUTPATIENT)
Dept: PAIN MEDICINE | Facility: HOSPITAL | Age: 58
End: 2018-11-16
Attending: ANESTHESIOLOGY

## 2018-11-19 DIAGNOSIS — R73.9 HYPERGLYCEMIA: ICD-10-CM

## 2018-11-19 DIAGNOSIS — Z00.00 HEALTH CARE MAINTENANCE: ICD-10-CM

## 2018-11-19 DIAGNOSIS — J30.9 CHRONIC ALLERGIC RHINITIS: ICD-10-CM

## 2018-11-19 DIAGNOSIS — G89.29 CHRONIC BILATERAL LOW BACK PAIN WITH LEFT-SIDED SCIATICA: ICD-10-CM

## 2018-11-19 DIAGNOSIS — F32.A DEPRESSION, UNSPECIFIED DEPRESSION TYPE: ICD-10-CM

## 2018-11-19 DIAGNOSIS — R51.9 FRONTAL HEADACHE: ICD-10-CM

## 2018-11-19 DIAGNOSIS — F17.200 CURRENT EVERY DAY SMOKER: ICD-10-CM

## 2018-11-19 DIAGNOSIS — M54.42 CHRONIC BILATERAL LOW BACK PAIN WITH LEFT-SIDED SCIATICA: ICD-10-CM

## 2018-11-19 DIAGNOSIS — E78.5 HYPERLIPIDEMIA, UNSPECIFIED HYPERLIPIDEMIA TYPE: ICD-10-CM

## 2018-11-21 ENCOUNTER — APPOINTMENT (OUTPATIENT)
Dept: PHYSICAL THERAPY | Facility: HOSPITAL | Age: 58
End: 2018-11-21

## 2018-11-28 ENCOUNTER — APPOINTMENT (OUTPATIENT)
Dept: PHYSICAL THERAPY | Facility: HOSPITAL | Age: 58
End: 2018-11-28

## 2018-11-30 RX ORDER — CELECOXIB 200 MG/1
200 CAPSULE ORAL 2 TIMES DAILY
Qty: 60 CAPSULE | Refills: 5 | Status: SHIPPED | OUTPATIENT
Start: 2018-11-30 | End: 2018-12-27 | Stop reason: SDUPTHER

## 2018-12-05 ENCOUNTER — HOSPITAL ENCOUNTER (OUTPATIENT)
Dept: PHYSICAL THERAPY | Facility: HOSPITAL | Age: 58
Setting detail: THERAPIES SERIES
End: 2018-12-05

## 2018-12-06 ENCOUNTER — ANESTHESIA EVENT (OUTPATIENT)
Dept: PAIN MEDICINE | Facility: HOSPITAL | Age: 58
End: 2018-12-06

## 2018-12-06 ENCOUNTER — HOSPITAL ENCOUNTER (OUTPATIENT)
Dept: GENERAL RADIOLOGY | Facility: HOSPITAL | Age: 58
Discharge: HOME | End: 2018-12-06

## 2018-12-06 ENCOUNTER — HOSPITAL ENCOUNTER (OUTPATIENT)
Dept: PAIN MEDICINE | Facility: HOSPITAL | Age: 58
Discharge: HOME | End: 2018-12-06
Attending: ANESTHESIOLOGY | Admitting: ANESTHESIOLOGY

## 2018-12-06 ENCOUNTER — ANESTHESIA (OUTPATIENT)
Dept: PAIN MEDICINE | Facility: HOSPITAL | Age: 58
End: 2018-12-06

## 2018-12-06 VITALS
DIASTOLIC BLOOD PRESSURE: 78 MMHG | RESPIRATION RATE: 16 BRPM | TEMPERATURE: 97.8 F | OXYGEN SATURATION: 96 % | SYSTOLIC BLOOD PRESSURE: 127 MMHG | HEART RATE: 87 BPM

## 2018-12-06 DIAGNOSIS — R52 PAIN: ICD-10-CM

## 2018-12-06 DIAGNOSIS — M51.36 DDD (DEGENERATIVE DISC DISEASE), LUMBAR: ICD-10-CM

## 2018-12-06 DIAGNOSIS — M54.16 LUMBAR RADICULOPATHY: ICD-10-CM

## 2018-12-06 PROCEDURE — C1755 CATHETER, INTRASPINAL: HCPCS

## 2018-12-06 PROCEDURE — 77003 FLUOROGUIDE FOR SPINE INJECT: CPT

## 2018-12-06 PROCEDURE — 25010000002 MIDAZOLAM PER 1 MG: Performed by: ANESTHESIOLOGY

## 2018-12-06 PROCEDURE — 25010000002 METHYLPREDNISOLONE PER 80 MG: Performed by: ANESTHESIOLOGY

## 2018-12-06 RX ORDER — SODIUM CHLORIDE 0.9 % (FLUSH) 0.9 %
1-10 SYRINGE (ML) INJECTION AS NEEDED
Status: DISCONTINUED | OUTPATIENT
Start: 2018-12-06 | End: 2018-12-07 | Stop reason: HOSPADM

## 2018-12-06 RX ORDER — SODIUM CHLORIDE 0.9 % (FLUSH) 0.9 %
3-10 SYRINGE (ML) INJECTION AS NEEDED
Status: DISCONTINUED | OUTPATIENT
Start: 2018-12-06 | End: 2018-12-07 | Stop reason: HOSPADM

## 2018-12-06 RX ORDER — LIDOCAINE HYDROCHLORIDE 10 MG/ML
1 INJECTION, SOLUTION INFILTRATION; PERINEURAL ONCE AS NEEDED
Status: DISCONTINUED | OUTPATIENT
Start: 2018-12-06 | End: 2018-12-07 | Stop reason: HOSPADM

## 2018-12-06 RX ORDER — FENTANYL CITRATE 50 UG/ML
50 INJECTION, SOLUTION INTRAMUSCULAR; INTRAVENOUS AS NEEDED
Status: DISCONTINUED | OUTPATIENT
Start: 2018-12-06 | End: 2018-12-07 | Stop reason: HOSPADM

## 2018-12-06 RX ORDER — METHYLPREDNISOLONE ACETATE 80 MG/ML
80 INJECTION, SUSPENSION INTRA-ARTICULAR; INTRALESIONAL; INTRAMUSCULAR; SOFT TISSUE ONCE
Status: COMPLETED | OUTPATIENT
Start: 2018-12-06 | End: 2018-12-06

## 2018-12-06 RX ORDER — SODIUM CHLORIDE 0.9 % (FLUSH) 0.9 %
3 SYRINGE (ML) INJECTION EVERY 12 HOURS SCHEDULED
Status: DISCONTINUED | OUTPATIENT
Start: 2018-12-06 | End: 2018-12-07 | Stop reason: HOSPADM

## 2018-12-06 RX ORDER — MIDAZOLAM HYDROCHLORIDE 1 MG/ML
1 INJECTION INTRAMUSCULAR; INTRAVENOUS AS NEEDED
Status: DISCONTINUED | OUTPATIENT
Start: 2018-12-06 | End: 2018-12-07 | Stop reason: HOSPADM

## 2018-12-06 RX ADMIN — METHYLPREDNISOLONE ACETATE 80 MG: 80 INJECTION, SUSPENSION INTRA-ARTICULAR; INTRALESIONAL; INTRAMUSCULAR; SOFT TISSUE at 13:20

## 2018-12-06 RX ADMIN — MIDAZOLAM HYDROCHLORIDE 1 MG: 2 INJECTION, SOLUTION INTRAMUSCULAR; INTRAVENOUS at 13:16

## 2018-12-06 NOTE — ANESTHESIA PROCEDURE NOTES
PAIN Epidural block    Pre-sedation assessment completed: 12/6/2018 1:15 PM    Patient reassessed immediately prior to procedure    Patient location during procedure: pain clinic  Start Time: 12/6/2018 1:16 PM  Stop Time: 12/6/2018 1:21 PM  Indication:at surgeon's request and procedure for pain  Performed By  Anesthesiologist: Dutch Zhu MD  Preanesthetic Checklist  Completed: patient identified, site marked, surgical consent, pre-op evaluation, timeout performed, risks and benefits discussed and monitors and equipment checked  Additional Notes  Post-Op Diagnosis Codes:     * Lumbago (M54.5)     * Lumbar neuritis (M54.16)     * Degeneration of lumbar intervertebral disc (M51.36)    Prep:  Pt Position:prone  Sterile Tech:sterile barrier, mask and gloves  Prep:chlorhexidine gluconate and isopropyl alcohol  Monitoring:blood pressure monitoring, continuous pulse oximetry and EKG  Procedure:  Sedation: yes   Approach:left paramedian  Guidance: fluoroscopy  Location:lumbar  Interspace: L5S1.  Needle Gauge:20 G  Aspiration:negative  Test Dose:negative  Medications:  Depomedrol:80 mg  Preservative Free Saline:2mL    Post Assessment:  Pt Tolerance:patient tolerated the procedure well with no apparent complications  Complications:no

## 2018-12-06 NOTE — H&P
[facility]    History and Physical    Patient Name: Flaquita Cr  :  1960  MRN:  [id number]  Date of Admission: 2018    Subjective     Patient is a 58 y.o. female presents with chief complaint of chronic low back pain.  Onset of symptoms was gradual starting several years ago.  Symptoms are associated/aggravated by nothing in particular. Symptoms improve with injection    He is had 2 previous epidural steroid injection.  She reports maybe 50% relief of her discomfort however its somewhat fleeting.  She says overall she still somewhat better but her pain relief is hard to quantitate.  She continues to have low back pain that radiates down both her legs and into her toes.  The great toe on one of her feet is very numb and she fells like it's somewhat weak.  I'm unable to appreciate any obvious weakness in any lower extremities.      The following portions of the patients history were reviewed and updated as appropriate: current medications, allergies, past medical history, past surgical history, past family history, past social history and problem list                Objective     Past Medical History:   Past Medical History:   Diagnosis Date   • Allergy     Allergies   • Arthritis     failed naproxena dn meloxicam - celebrex works well   • Asthma    • Circulation problem    • Colon polyp    • COPD (chronic obstructive pulmonary disease) (CMS/Formerly [facility])     does not use O2   • Depression    • Emphysema of lung (CMS/Formerly [facility])    • Fatigue    • Gastritis    • GERD (gastroesophageal reflux disease)    • Hyperlipidemia    • Hypertension     just started, no meds   • IBS (irritable bowel syndrome)    • Low back pain    • OAB (overactive bladder)    • PAD (peripheral artery disease) (CMS/Formerly [facility])    • Peripheral arterial disease (CMS/Formerly [facility])    • Right leg pain    • Sleep apnea      Past Surgical History:   Past Surgical History:   Procedure Laterality Date   • AORTA FEMORAL BYPASS     • EPIDURAL BLOCK     •  HYSTERECTOMY  1988    endometriosis   • LEG SURGERY Bilateral     for PAD   • ROOT CANAL      R upper jaw with rootcanal     Family History:   Family History   Problem Relation Age of Onset   • Osteoporosis Mother    • Atrial fibrillation Mother         pacemaker   • Arthritis Mother    • Alzheimer's disease Mother    • Macular degeneration Father    • Cancer Father    • Arthritis Sister    • Gout Brother    • Arthritis Brother    • Colon cancer Maternal Aunt    • Uterine cancer Maternal Aunt    • Breast cancer Neg Hx      Social History:   Social History     Tobacco Use   • Smoking status: Current Every Day Smoker     Years: 0.20     Types: Cigarettes, Electronic Cigarette   • Smokeless tobacco: Never Used   • Tobacco comment: Began smoking at age 10.  Smoked 1 ppd for 15 years, 2 ppd for 18 years, and 2.5 ppd for 15 years for an 88.5 pack year history.  Currently smoking 4 tobacco cigarettes daily with frequent use of an e-cigarette.  Using e-cig since 2010.   Substance Use Topics   • Alcohol use: Yes     Comment: once per year, holidays   • Drug use: No       Vital Signs Range for the last 24 hours  Temperature: Temp:  [36.6 °C (97.8 °F)] 36.6 °C (97.8 °F)   Temp Source: Temp src: Oral   BP: BP: (123)/(94) 123/94   Pulse: Heart Rate:  [111] 111   Respirations: Resp:  [16] 16   SPO2: SpO2:  [96 %] 96 %   O2 Amount (l/min):     O2 Devices Device (Oxygen Therapy): room air   Weight:           --------------------------------------------------------------------------------    Current Outpatient Medications   Medication Sig Dispense Refill   • Acetaminophen (TYLENOL ARTHRITIS PAIN PO) Take  by mouth.     • albuterol (PROVENTIL HFA;VENTOLIN HFA) 108 (90 Base) MCG/ACT inhaler Inhale 2 puffs Every 4 (Four) Hours As Needed for Wheezing or Shortness of Air. 18 g 1   • Bepotastine Besilate 1.5 % solution Apply  to eye(s) as directed by provider.     • celecoxib (CELEBREX) 200 MG capsule Take 1 capsule by mouth 2 (Two) Times  a Day. 60 capsule 5   • cetirizine (ZYRTEC ALLERGY) 10 MG tablet Take 10 mg by mouth Daily.     • Cholecalciferol (VITAMIN D) 2000 units tablet Take 2,000 Units by mouth Daily.     • diphenhydrAMINE (BENADRYL) 25 mg capsule Take 25 mg by mouth Every 6 (Six) Hours As Needed for Itching.     • flunisolide (NASALIDE) 25 MCG/ACT (0.025%) solution nasal spray Inhale 2 sprays Every 12 (Twelve) Hours. 3 bottle 3   • FLUoxetine (PROzac) 20 MG capsule TAKE ONE CAPSULE BY MOUTH DAILY 90 capsule 1   • fluticasone (FLONASE) 50 MCG/ACT nasal spray 2 sprays into each nostril Daily.     • Fluticasone-Umeclidin-Vilant (TRELEGY ELLIPTA) 100-62.5-25 MCG/INH aerosol powder  Inhale.     • guaiFENesin (MUCINEX) 600 MG 12 hr tablet Take 1,200 mg by mouth 2 (Two) Times a Day.     • Mirabegron ER (MYRBETRIQ) 25 MG tablet sustained-release 24 hour 24 hr tablet Take 1 tablet by mouth Daily. 30 tablet 6   • montelukast (SINGULAIR) 10 MG tablet Take 1 tablet by mouth Every Night. 30 tablet 5   • HYDROcodone-acetaminophen (LORTAB) 7.5-325 MG per tablet Take 1 tablet by mouth Every 6 (Six) Hours As Needed for Moderate Pain .     • omeprazole (priLOSEC) 40 MG capsule Take 1 capsule by mouth Daily. 30 capsule 5   • pseudoephedrine (SUDAFED) 30 MG tablet Take 30 mg by mouth Every 4 (Four) Hours As Needed for Congestion.     • Spacer/Aero Chamber Mouthpiece misc Use spacer with all inhaled treatments 1 each 1     Current Facility-Administered Medications   Medication Dose Route Frequency Provider Last Rate Last Dose   • sodium chloride 0.9 % flush 3 mL  3 mL Intravenous Q12H Steven Hancock MD       • sodium chloride 0.9 % flush 3-10 mL  3-10 mL Intravenous PRN Steven Hancock MD           --------------------------------------------------------------------------------  Assessment/Plan      Anesthesia Evaluation           Pain impairs ability to perform ADLs: Ambulation       Airway   Mallampati: II  TM distance: >3 FB  Neck ROM: full  Dental - normal  exam     Pulmonary - normal exam   (+) COPD moderate, asthma, sleep apnea,   Cardiovascular     Rhythm: regular    (+) hypertension, PVD, hyperlipidemia,   (-) murmur      Neuro/Psych  (+) numbness, psychiatric history,   left straight leg raise test,   right straight leg raise test, Sensory Deficit,    GI/Hepatic/Renal/Endo    (+)  GERD,      Musculoskeletal     Abdominal   (+) obese,    Substance History      OB/GYN          Other                   Diagnosis and Plan    Treatment Plan  ASA 3   Patient has had previous injection/procedure with 50-75% improvement.   Procedures: With fluoroscopy,           She's here for her third epidural steroid injection series.  She had significantly improved pain relief I would recommend that she be very judicious in continuing with these in the future.  If however she does get pretty decent pain relief that helps her function then this might be a reasonable treatment modality for her.    Diagnosis     * Lumbago [M54.5]     * Lumbar neuritis [M54.16]     * Degeneration of lumbar intervertebral disc [M51.36]

## 2018-12-10 RX ORDER — MONTELUKAST SODIUM 10 MG/1
TABLET ORAL
Qty: 30 TABLET | Refills: 4 | OUTPATIENT
Start: 2018-12-10

## 2018-12-17 ENCOUNTER — TELEPHONE (OUTPATIENT)
Dept: INTERNAL MEDICINE | Facility: CLINIC | Age: 58
End: 2018-12-17

## 2018-12-17 NOTE — TELEPHONE ENCOUNTER
----- Message from Onelia Laguerre sent at 12/17/2018 10:00 AM EST -----  Pt's insurance will no approve of this medication anymore, She wanted to give us time to get it pre-authorized or if we could find a different medication to use     Mirabegron ER (MYRBETRIQ) 25 MG tablet sustained-release 24 hour 24 hr tablet        [phone]

## 2018-12-17 NOTE — TELEPHONE ENCOUNTER
I talked with patient and she said medication will no longer be covered after the first of the year.    I informed patient that the medication will have to be denied before I can do a PA.     Will wait for January when med is denied to do PA.    Patient notes that Myrbetriq doesn't work too well, but works better than other meds she has tried in the past. She will discuss this with Cadence.    Patient also due for labs and follow up. She was transferred to front desk to schedule.

## 2018-12-27 ENCOUNTER — TELEPHONE (OUTPATIENT)
Dept: INTERNAL MEDICINE | Facility: CLINIC | Age: 58
End: 2018-12-27

## 2018-12-27 DIAGNOSIS — F32.A DEPRESSION, UNSPECIFIED DEPRESSION TYPE: ICD-10-CM

## 2018-12-27 RX ORDER — MONTELUKAST SODIUM 10 MG/1
10 TABLET ORAL NIGHTLY
Qty: 30 TABLET | Refills: 0 | Status: SHIPPED | OUTPATIENT
Start: 2018-12-27 | End: 2019-02-07 | Stop reason: SDUPTHER

## 2018-12-27 RX ORDER — CELECOXIB 200 MG/1
200 CAPSULE ORAL 2 TIMES DAILY
Qty: 60 CAPSULE | Refills: 0 | Status: SHIPPED | OUTPATIENT
Start: 2018-12-27 | End: 2019-02-22 | Stop reason: SDUPTHER

## 2018-12-27 RX ORDER — FLUOXETINE HYDROCHLORIDE 40 MG/1
40 CAPSULE ORAL DAILY
Qty: 30 CAPSULE | Refills: 0 | Status: SHIPPED | OUTPATIENT
Start: 2018-12-27 | End: 2019-01-24 | Stop reason: SDUPTHER

## 2019-01-24 DIAGNOSIS — N32.81 OVERACTIVE BLADDER: ICD-10-CM

## 2019-01-24 RX ORDER — MIRABEGRON 25 MG/1
TABLET, FILM COATED, EXTENDED RELEASE ORAL
Qty: 30 TABLET | Refills: 0 | Status: SHIPPED | OUTPATIENT
Start: 2019-01-24 | End: 2019-01-31 | Stop reason: SDUPTHER

## 2019-01-24 RX ORDER — FLUOXETINE HYDROCHLORIDE 40 MG/1
CAPSULE ORAL
Qty: 30 CAPSULE | Refills: 0 | Status: SHIPPED | OUTPATIENT
Start: 2019-01-24 | End: 2019-03-01 | Stop reason: ALTCHOICE

## 2019-01-25 LAB
ALBUMIN SERPL-MCNC: 4.7 G/DL (ref 3.5–5.2)
ALBUMIN/GLOB SERPL: 1.3 G/DL
ALP SERPL-CCNC: 89 U/L (ref 39–117)
ALT SERPL-CCNC: 37 U/L (ref 1–33)
AST SERPL-CCNC: 31 U/L (ref 1–32)
BILIRUB SERPL-MCNC: 0.2 MG/DL (ref 0.1–1.2)
BUN SERPL-MCNC: 24 MG/DL (ref 6–20)
BUN/CREAT SERPL: 20.2 (ref 7–25)
CALCIUM SERPL-MCNC: 10.3 MG/DL (ref 8.6–10.5)
CHLORIDE SERPL-SCNC: 98 MMOL/L (ref 98–107)
CHOLEST SERPL-MCNC: 273 MG/DL (ref 0–200)
CO2 SERPL-SCNC: 24.3 MMOL/L (ref 22–29)
CREAT SERPL-MCNC: 1.19 MG/DL (ref 0.57–1)
GLOBULIN SER CALC-MCNC: 3.6 GM/DL
GLUCOSE SERPL-MCNC: 91 MG/DL (ref 65–99)
HBA1C MFR BLD: 6 % (ref 4.8–5.6)
HDLC SERPL-MCNC: 57 MG/DL (ref 40–60)
LDLC SERPL CALC-MCNC: 182 MG/DL (ref 0–100)
LDLC/HDLC SERPL: 3.2 {RATIO}
POTASSIUM SERPL-SCNC: 5 MMOL/L (ref 3.5–5.2)
PROT SERPL-MCNC: 8.3 G/DL (ref 6–8.5)
SODIUM SERPL-SCNC: 138 MMOL/L (ref 136–145)
TRIGL SERPL-MCNC: 169 MG/DL (ref 0–150)
VLDLC SERPL CALC-MCNC: 33.8 MG/DL (ref 5–40)

## 2019-01-30 ENCOUNTER — DOCUMENTATION (OUTPATIENT)
Dept: PHYSICAL THERAPY | Facility: HOSPITAL | Age: 59
End: 2019-01-30

## 2019-01-30 NOTE — THERAPY DISCHARGE NOTE
Outpatient Physical Therapy Discharge Summary         Patient Name: Flaquita Cr  : 1960  MRN: [id number]    Today's Date: 2019    Visit Dx:  No diagnosis found.    PT OP Goals     Row Name 19 1000          Long Term Goals    LTG 1  Reduce pain to 3/10 with water exercise and more centralized (from lateral hips).   -CK     LTG 1 Progress  Not Met  -CK     LTG 2  Pt to sit 5 min without shifting due to increased pain (on eval shifted every few minutes, sits on leg in chair each side.)   -CK     LTG 2 Progress  Met  -CK     LTG 3  5x sit to stand from 26.28 seconds with hands to 20 seconds no hands. (use hands on eval and was short of air by end)   -CK     LTG 3 Progress  Met  -CK     LTG 4  Modified Oswestry perceived disability from 60% on eval to 42% on eval.   -CK     LTG 4 Progress  Partially Met  -CK     LTG 4 Progress Comments  48% at last reassessment.  -CK     LTG 5  Pt independent with self management of condition with water exercise.    -CK     LTG 5 Progress  Met  -CK     LTG 5 Progress Comments   Pt plans membership to [employer] pool and also to use inversion table at home  -CK     LTG 6  patient will be able to walk grocery store or [employer] without seated rest break and minimal increase in pain level for improved community mobility  -CK     LTG 6 Progress  Met  -CK     LTG 6 Progress Comments  able with pain  -CK       User Key  (r) = Recorded By, (t) = Taken By, (c) = Cosigned By    Initials Name Provider Type    Onel Mandel, PT Physical Therapist          OP PT Discharge Summary  Date of Discharge: 19  Reason for Discharge: other (comment)(patient self discharged)  Outcomes Achieved: Patient able to partially acheive established goals  Discharge Destination: Home without follow-up  Discharge Instructions/Additional Comments: Ms. Cr was seen for 12 skilled aquatic therapy sessions since initiating treatment for chronic low back pain. Difficulty with  frequency and consistency of visits for a multitude of reasons. Working with pain management as well during therapy for pain control with ESIs. She did not return for further therapy services after 12 sessions despite attempts to get her back on the schedule.       Time Calculation:        Therapy Suggested Charges     Code   Minutes Charges    None                       Onel Marin, PT  1/30/2019

## 2019-01-31 ENCOUNTER — OFFICE VISIT (OUTPATIENT)
Dept: INTERNAL MEDICINE | Facility: CLINIC | Age: 59
End: 2019-01-31

## 2019-01-31 VITALS
DIASTOLIC BLOOD PRESSURE: 64 MMHG | HEART RATE: 83 BPM | BODY MASS INDEX: 34.33 KG/M2 | TEMPERATURE: 97.6 F | WEIGHT: 201.1 LBS | HEIGHT: 64 IN | SYSTOLIC BLOOD PRESSURE: 120 MMHG | OXYGEN SATURATION: 97 %

## 2019-01-31 DIAGNOSIS — F17.200 CURRENT EVERY DAY SMOKER: ICD-10-CM

## 2019-01-31 DIAGNOSIS — J06.9 UPPER RESPIRATORY TRACT INFECTION, UNSPECIFIED TYPE: ICD-10-CM

## 2019-01-31 DIAGNOSIS — R51.9 NONINTRACTABLE HEADACHE, UNSPECIFIED CHRONICITY PATTERN, UNSPECIFIED HEADACHE TYPE: ICD-10-CM

## 2019-01-31 DIAGNOSIS — N32.81 OVERACTIVE BLADDER: ICD-10-CM

## 2019-01-31 DIAGNOSIS — F32.A DEPRESSION, UNSPECIFIED DEPRESSION TYPE: Primary | ICD-10-CM

## 2019-01-31 PROCEDURE — 99214 OFFICE O/P EST MOD 30 MIN: CPT | Performed by: NURSE PRACTITIONER

## 2019-01-31 NOTE — PROGRESS NOTES
Subjective   Flaquita Cr is a 58 y.o. female.     History of Present Illness   Patient is here today to follow-up on adjustment and anxiety medication.  Last month Prozac increased to 40 mg by mouth daily.  In addition lab work was drawn last week. She felt like she was going to do well with 20 mg daily and increased to 40 with even better improvement. She accidentally took 2 a day then returned to 40 a day. She accidentally ran out and has been out for several days.   She tried the ESIs for back pain, with out relief. She needs to follow up on this. She feels this is affecting bowel and bladder control. Having to rush and having fecal and urinary incontinence.     I have a headache all the time. She has been to the allergist and is to start allergy shots.   Sore throat chills congestion ×2 weeks.    Using E-cig, 4 ml of nicotine. She is planning trying nicorette soon.   The following portions of the patient's history were reviewed and updated as appropriate: allergies, current medications, past family history, past medical history, past social history, past surgical history and problem list.    Review of Systems   Constitutional: Positive for chills and fatigue. Negative for fever.   HENT: Positive for congestion, postnasal drip, rhinorrhea and sore throat.    Respiratory: Negative.    Cardiovascular: Negative.    Psychiatric/Behavioral: Negative for suicidal ideas. The patient is nervous/anxious.        Objective   Physical Exam   Constitutional: She appears well-developed and well-nourished. She appears ill.   HENT:   Right Ear: Hearing, tympanic membrane, external ear and ear canal normal.   Left Ear: Hearing, tympanic membrane, external ear and ear canal normal.   Nose: Nose normal.   Mouth/Throat: Uvula is midline, oropharynx is clear and moist and mucous membranes are normal.   Neck: Normal range of motion. Neck supple. No thyromegaly present.   Cardiovascular: Normal rate, regular rhythm, normal heart  sounds and intact distal pulses.   Pulmonary/Chest: Effort normal. She has decreased breath sounds (through out, baseline).   Skin: Skin is warm and dry.   Psychiatric: She has a normal mood and affect. Her behavior is normal. Judgment and thought content normal.       Assessment/Plan   There are no diagnoses linked to this encounter.    1. Depression- return to prozac. Rx at pharmacy.   2. OAB/Urinary incontinence- insurance is not wanting to cover the myrbetriq, she needs more benefit, we will try 50 mg daily  3. Nicotine dependence- quit smoking, use aids  4. Headaches- start allergy meds, work on URI  5. URI- rest, mucinex, hydrate, vit C, allergy meds, she has been exposed to the flu but has been several weeks.  We will hold off on testing as treatment would not help.    Follow-up in a few weeks to discuss labs.

## 2019-02-05 ENCOUNTER — TELEPHONE (OUTPATIENT)
Dept: INTERNAL MEDICINE | Facility: CLINIC | Age: 59
End: 2019-02-05

## 2019-02-05 RX ORDER — DARIFENACIN HYDROBROMIDE 7.5 MG/1
7.5 TABLET, EXTENDED RELEASE ORAL DAILY
Qty: 30 TABLET | Refills: 5 | Status: SHIPPED | OUTPATIENT
Start: 2019-02-05 | End: 2019-09-20 | Stop reason: SDUPTHER

## 2019-02-05 NOTE — TELEPHONE ENCOUNTER
----- Message from ISAAK Bruner sent at 2/4/2019  3:52 PM EST -----  Ok to try enablex 7.5 mg PO dailiy #30, 5 refills  ----- Message -----  From: Cody Fragoso  Sent: 2/1/2019  10:59 AM  To: ISAAK Bruner    Ok to try enablex?  ----- Message -----  From: Beata Garcia  Sent: 2/1/2019  10:01 AM  To: Cody Fragoso    Pt said even after the 20 copay the payment for Mirabegron ER (MYRBETRIQ) 50 MG tablet sustained-release 24 hour 24 hr tablet is still going to be substantial. She said if you don't want to deal with the prior auth she said she would try the Enablex.  Phone: [phone]

## 2019-02-07 RX ORDER — MONTELUKAST SODIUM 10 MG/1
TABLET ORAL
Qty: 90 TABLET | Refills: 1 | Status: SHIPPED | OUTPATIENT
Start: 2019-02-07 | End: 2019-10-22 | Stop reason: SDUPTHER

## 2019-02-12 RX ORDER — OMEPRAZOLE 40 MG/1
CAPSULE, DELAYED RELEASE ORAL
Qty: 30 CAPSULE | Refills: 4 | Status: SHIPPED | OUTPATIENT
Start: 2019-02-12 | End: 2019-08-26 | Stop reason: SDUPTHER

## 2019-02-22 RX ORDER — CELECOXIB 200 MG/1
CAPSULE ORAL
Qty: 60 CAPSULE | Refills: 3 | Status: SHIPPED | OUTPATIENT
Start: 2019-02-22 | End: 2019-09-02 | Stop reason: SDUPTHER

## 2019-03-01 RX ORDER — FLUOXETINE HYDROCHLORIDE 40 MG/1
CAPSULE ORAL
Qty: 30 CAPSULE | Refills: 0 | Status: SHIPPED | OUTPATIENT
Start: 2019-03-01 | End: 2019-04-04 | Stop reason: SDUPTHER

## 2019-03-01 NOTE — TELEPHONE ENCOUNTER
RX SENT TO PHARMACY    ----- Message from Yanelis Coelho sent at 3/1/2019 12:28 PM EST -----  Regarding: MED REFILL  Patient needs a refill sent in for her Fluoxetine. She would like to see if it comes in tablets instead of capsules and iff possible send that in. She only has enough until Sunday. Script needs to go to [facility]. Thanks

## 2019-03-07 ENCOUNTER — OFFICE VISIT (OUTPATIENT)
Dept: INTERNAL MEDICINE | Facility: CLINIC | Age: 59
End: 2019-03-07

## 2019-03-07 VITALS
BODY MASS INDEX: 33.77 KG/M2 | SYSTOLIC BLOOD PRESSURE: 118 MMHG | HEART RATE: 95 BPM | DIASTOLIC BLOOD PRESSURE: 68 MMHG | WEIGHT: 197.8 LBS | OXYGEN SATURATION: 95 % | HEIGHT: 64 IN | TEMPERATURE: 98 F

## 2019-03-07 DIAGNOSIS — R79.89 ELEVATED SERUM CREATININE: ICD-10-CM

## 2019-03-07 DIAGNOSIS — J06.9 UPPER RESPIRATORY TRACT INFECTION, UNSPECIFIED TYPE: ICD-10-CM

## 2019-03-07 DIAGNOSIS — R73.03 PREDIABETES: Primary | ICD-10-CM

## 2019-03-07 DIAGNOSIS — E78.5 HYPERLIPIDEMIA, UNSPECIFIED HYPERLIPIDEMIA TYPE: ICD-10-CM

## 2019-03-07 DIAGNOSIS — F17.200 CURRENT EVERY DAY SMOKER: ICD-10-CM

## 2019-03-07 PROBLEM — R73.9 HYPERGLYCEMIA: Status: RESOLVED | Noted: 2018-05-30 | Resolved: 2019-03-07

## 2019-03-07 PROCEDURE — 99214 OFFICE O/P EST MOD 30 MIN: CPT | Performed by: NURSE PRACTITIONER

## 2019-03-07 RX ORDER — BUDESONIDE AND FORMOTEROL FUMARATE DIHYDRATE 160; 4.5 UG/1; UG/1
2 AEROSOL RESPIRATORY (INHALATION)
COMMUNITY
End: 2019-06-18

## 2019-03-07 RX ORDER — AZITHROMYCIN 250 MG/1
1 TABLET, FILM COATED ORAL DAILY
COMMUNITY
Start: 2019-03-05 | End: 2019-06-18

## 2019-03-07 NOTE — PATIENT INSTRUCTIONS
Mediterranean Diet  A Mediterranean diet refers to food and lifestyle choices that are based on the traditions of countries located on the [place]. This way of eating has been shown to help prevent certain conditions and improve outcomes for people who have chronic diseases, like kidney disease and heart disease.  What are tips for following this plan?  Lifestyle  · Cook and eat meals together with your family, when possible.  · Drink enough fluid to keep your urine clear or pale yellow.  · Be physically active every day. This includes:  ? Aerobic exercise like running or swimming.  ? Leisure activities like gardening, walking, or housework.  · Get 7-8 hours of sleep each night.  · If recommended by your health care provider, drink red wine in moderation. This means 1 glass a day for nonpregnant women and 2 glasses a day for men. A glass of wine equals 5 oz (150 mL).  Reading food labels  · Check the serving size of packaged foods. For foods such as rice and pasta, the serving size refers to the amount of cooked product, not dry.  · Check the total fat in packaged foods. Avoid foods that have saturated fat or trans fats.  · Check the ingredients list for added sugars, such as corn syrup.  Shopping  · At the grocery store, buy most of your food from the areas near the walls of the store. This includes:  ? Fresh fruits and vegetables (produce).  ? Grains, beans, nuts, and seeds. Some of these may be available in unpackaged forms or large amounts (in bulk).  ? Fresh seafood.  ? Poultry and eggs.  ? Low-fat dairy products.  · Buy whole ingredients instead of prepackaged foods.  · Buy fresh fruits and vegetables in-season from local farmers markets.  · Buy frozen fruits and vegetables in resealable bags.  · If you do not have access to quality fresh seafood, buy precooked frozen shrimp or canned fish, such as tuna, salmon, or sardines.  · Buy small amounts of raw or cooked vegetables, salads, or olives from the  deli or salad bar at your store.  · Stock your pantry so you always have certain foods on hand, such as olive oil, canned tuna, canned tomatoes, rice, pasta, and beans.  Cooking  · Cook foods with extra-virgin olive oil instead of using butter or other vegetable oils.  · Have meat as a side dish, and have vegetables or grains as your main dish. This means having meat in small portions or adding small amounts of meat to foods like pasta or stew.  · Use beans or vegetables instead of meat in common dishes like chili or lasagna.  · Kremlin with different cooking methods. Try roasting or broiling vegetables instead of steaming or sautéeing them.  · Add frozen vegetables to soups, stews, pasta, or rice.  · Add nuts or seeds for added healthy fat at each meal. You can add these to yogurt, salads, or vegetable dishes.  · Marinate fish or vegetables using olive oil, lemon juice, garlic, and fresh herbs.  Meal planning  · Plan to eat 1 vegetarian meal one day each week. Try to work up to 2 vegetarian meals, if possible.  · Eat seafood 2 or more times a week.  · Have healthy snacks readily available, such as:  ? Vegetable sticks with hummus.  ? Greek yogurt.  ? Fruit and nut trail mix.  · Eat balanced meals throughout the week. This includes:  ? Fruit: 2-3 servings a day  ? Vegetables: 4-5 servings a day  ? Low-fat dairy: 2 servings a day  ? Fish, poultry, or lean meat: 1 serving a day  ? Beans and legumes: 2 or more servings a week  ? Nuts and seeds: 1-2 servings a day  ? Whole grains: 6-8 servings a day  ? Extra-virgin olive oil: 3-4 servings a day  · Limit red meat and sweets to only a few servings a month  What are my food choices?  · Mediterranean diet  ? Recommended  ? Grains: Whole-grain pasta. Brown rice. Bulgar wheat. Polenta. Couscous. Whole-wheat bread. Oatmeal. Quinoa.  ? Vegetables: Artichokes. Beets. Broccoli. Cabbage. Carrots. Eggplant. Green beans. Chard. Kale. Spinach. Onions. Leeks. Peas. Squash.  Tomatoes. Peppers. Radishes.  ? Fruits: Apples. Apricots. Avocado. Berries. Bananas. Cherries. Dates. Figs. Grapes. Nely. Melon. Oranges. Peaches. Plums. Pomegranate.  ? Meats and other protein foods: Beans. Almonds. Sunflower seeds. Pine nuts. Peanuts. Cod. Senatobia. Scallops. Shrimp. Tuna. Tilapia. Clams. Oysters. Eggs.  ? Dairy: Low-fat milk. Cheese. Greek yogurt.  ? Beverages: Water. Red wine. Herbal tea.  ? Fats and oils: Extra virgin olive oil. Avocado oil. Grape seed oil.  ? Sweets and desserts: Greek yogurt with honey. Baked apples. Poached pears. Trail mix.  ? Seasoning and other foods: Basil. Cilantro. Coriander. Cumin. Mint. Parsley. Tor. Rosemary. Tarragon. Garlic. Oregano. Thyme. Pepper. Balsalmic vinegar. Tahini. Hummus. Tomato sauce. Olives. Mushrooms.  ? Limit these  ? Grains: Prepackaged pasta or rice dishes. Prepackaged cereal with added sugar.  ? Vegetables: Deep fried potatoes (french fries).  ? Fruits: Fruit canned in syrup.  ? Meats and other protein foods: Beef. Pork. Lamb. Poultry with skin. Hot dogs. Perez.  ? Dairy: Ice cream. Sour cream. Whole milk.  ? Beverages: Juice. Sugar-sweetened soft drinks. Beer. Liquor and spirits.  ? Fats and oils: Butter. Canola oil. Vegetable oil. Beef fat (tallow). Lard.  ? Sweets and desserts: Cookies. Cakes. Pies. Candy.  ? Seasoning and other foods: Mayonnaise. Premade sauces and marinades.  ? The items listed may not be a complete list. Talk with your dietitian about what dietary choices are right for you.  Summary  · The Mediterranean diet includes both food and lifestyle choices.  · Eat a variety of fresh fruits and vegetables, beans, nuts, seeds, and whole grains.  · Limit the amount of red meat and sweets that you eat.  · Talk with your health care provider about whether it is safe for you to drink red wine in moderation. This means 1 glass a day for nonpregnant women and 2 glasses a day for men. A glass of wine equals 5 oz (150 mL).  This information  is not intended to replace advice given to you by your health care provider. Make sure you discuss any questions you have with your health care provider.  Document Released: 08/10/2017 Document Revised: 09/12/2017 Document Reviewed: 08/10/2017  [employer] Interactive Patient Education © 2018 [employer]

## 2019-03-07 NOTE — PROGRESS NOTES
Subjective   Flaquita Cr is a 58 y.o. female.     History of Present Illness   The patient is here today to F/U on depression. At last visit Prozac restarted. She had labs drawn and is here to discuss them.     Current every day smoker- using e-cig, trying to go down in nicotine, struggling some.   Went to [facility], given z-pack. X-ray was neg per patient. Flu test neg. Started antibiotic 2 days ago. Chest tightness improved, still with lots of sputum.   The following portions of the patient's history were reviewed and updated as appropriate: allergies, current medications, past family history, past medical history, past social history, past surgical history and problem list.    Review of Systems   HENT: Positive for congestion.    Respiratory: Positive for chest tightness (chest congestion). Negative for shortness of breath.    Cardiovascular: Negative.    Psychiatric/Behavioral: Positive for dysphoric mood. Negative for suicidal ideas. The patient is nervous/anxious.        Objective   Physical Exam   Constitutional: She appears well-developed and well-nourished.   Neck: Normal range of motion. Neck supple. No thyromegaly present.   Cardiovascular: Normal rate, regular rhythm, normal heart sounds and intact distal pulses.   Pulmonary/Chest: Effort normal. She has decreased breath sounds.   Skin: Skin is warm and dry.   Psychiatric: She has a normal mood and affect. Her behavior is normal. Judgment and thought content normal.       Assessment/Plan   There are no diagnoses linked to this encounter.    1. Prediabetes- work on low carb diet, exercise and wt loss  2. HPL- mediterranean style diet, CVD risk 7%, quit smoking, exercise, defers statin, check vascular screening  3. URI- continue same plan, if SOA develops return or get CXR  4. Elevated creatinine- continue hydrate well, use NSAIDs sparingly, recheck in 1 month  5. Current every day smoker- quit smoking, check CT screening in the summer

## 2019-04-04 RX ORDER — FLUOXETINE HYDROCHLORIDE 40 MG/1
CAPSULE ORAL
Qty: 30 CAPSULE | Refills: 5 | Status: SHIPPED | OUTPATIENT
Start: 2019-04-04 | End: 2019-08-22

## 2019-06-04 ENCOUNTER — TELEPHONE (OUTPATIENT)
Dept: CARDIOLOGY | Facility: CLINIC | Age: 59
End: 2019-06-04

## 2019-06-04 NOTE — TELEPHONE ENCOUNTER
Patient was rescheduled from Dr. Bright on 6/12, to [facility] on 6/14, but she cannot make this appt.    Please call patient to reschedule with Chante.    She can be reached at [phone].    Thanks!

## 2019-06-18 ENCOUNTER — OFFICE VISIT (OUTPATIENT)
Dept: CARDIOLOGY | Facility: CLINIC | Age: 59
End: 2019-06-18

## 2019-06-18 ENCOUNTER — OFFICE VISIT (OUTPATIENT)
Dept: GASTROENTEROLOGY | Facility: CLINIC | Age: 59
End: 2019-06-18

## 2019-06-18 VITALS
OXYGEN SATURATION: 98 % | HEART RATE: 77 BPM | DIASTOLIC BLOOD PRESSURE: 80 MMHG | WEIGHT: 197 LBS | BODY MASS INDEX: 33.63 KG/M2 | SYSTOLIC BLOOD PRESSURE: 130 MMHG | HEIGHT: 64 IN

## 2019-06-18 VITALS
SYSTOLIC BLOOD PRESSURE: 136 MMHG | WEIGHT: 194.2 LBS | HEIGHT: 64 IN | BODY MASS INDEX: 33.16 KG/M2 | DIASTOLIC BLOOD PRESSURE: 82 MMHG | TEMPERATURE: 97.4 F

## 2019-06-18 DIAGNOSIS — K21.9 GASTROESOPHAGEAL REFLUX DISEASE, ESOPHAGITIS PRESENCE NOT SPECIFIED: ICD-10-CM

## 2019-06-18 DIAGNOSIS — I73.9 PAD (PERIPHERAL ARTERY DISEASE) (HCC): Primary | ICD-10-CM

## 2019-06-18 DIAGNOSIS — R19.7 DIARRHEA, UNSPECIFIED TYPE: Primary | ICD-10-CM

## 2019-06-18 PROCEDURE — 93000 ELECTROCARDIOGRAM COMPLETE: CPT | Performed by: NURSE PRACTITIONER

## 2019-06-18 PROCEDURE — 99214 OFFICE O/P EST MOD 30 MIN: CPT | Performed by: NURSE PRACTITIONER

## 2019-06-18 PROCEDURE — 99213 OFFICE O/P EST LOW 20 MIN: CPT | Performed by: NURSE PRACTITIONER

## 2019-06-18 RX ORDER — DICYCLOMINE HCL 20 MG
20 TABLET ORAL
Qty: 90 TABLET | Refills: 5 | Status: SHIPPED | OUTPATIENT
Start: 2019-06-18 | End: 2019-07-18

## 2019-06-18 NOTE — PROGRESS NOTES
Date of Office Visit: 2019  Encounter Provider: ISAAK Jorge  Place of Service: [facility] CARDIOLOGY  Patient Name: Flaquita Cr  :1960    Chief Complaint   Patient presents with   • PAD (peripheral artery disease)   :     HPI: Flaquita Cr is a 59 y.o. female, new to me, who presents today for follow-up.  Old records have been obtained and reviewed by me.  She is a patient of Dr. Bright's with a past medical history significant for smoking, peripheral arterial disease, and chronic obstructive pulmonary disease.  In , she had aortobifemoral bypass surgery due to claudication.  She was last seen in the office on 2018 at which time she was reporting losing feeling in her left toe for several months.  Additionally, she was having pain in both of her legs with ambulation.  Dr. Bright performed hand-held Doppler examination of her lower extremities and felt her pulses to be excellent throughout.  He did not feel the symptom of leg pain and numbness was related to peripheral arterial disease.  Rather, he felt she was more likely suffering from cervical and lumbar spine disease and recommended that she see Dr. Saldivar for further evaluation and enrollment in physical therapy.  MRI imaging demonstrated moderate stenosis at C6-C7; however, she was not exhibiting any radiculopathy or signs or symptoms of myelopathy.  Neurosurgery felt the numbness and tingling was more peripheral and felt there were no indications for surgery.  There was not any severe nerve compression discovered on the lumbar spine MRI and epidural injections were recommended.  She is here today for a 1-year follow-up.   Over the past year she has been doing well from a cardiac standpoint.  She denies chest pain, palpitations, dizziness, or syncope.  She is still reporting numbness in her left toe that neurosurgery has evaluated.  She denies any symptoms in the way of claudication but does  struggle with neck, back, and shoulder pain.  In general she suffers from a lot of arthiritis and joint pain, which really limits her activity.  She has chronic shortness of breath due to COPD that is unchanged.  She has occasional lower extremity swelling. She has sleep apnea and is compliant with her CPAP.      Past Medical History:   Diagnosis Date   • Allergy     Allergies   • Arthritis     failed naproxena dn meloxicam - celebrex works well   • Asthma    • Circulation problem    • Colon polyp    • COPD (chronic obstructive pulmonary disease) (CMS/[facility])     does not use O2   • Depression    • Emphysema of lung (CMS/[facility])    • Fatigue    • Gastritis    • GERD (gastroesophageal reflux disease)    • Hyperglycemia 5/30/2018   • Hyperlipidemia    • Hypertension     just started, no meds   • IBS (irritable bowel syndrome)    • Low back pain    • OAB (overactive bladder)    • PAD (peripheral artery disease) (CMS/[facility])    • Peripheral arterial disease (CMS/[facility])    • Right leg pain    • Sleep apnea        Past Surgical History:   Procedure Laterality Date   • AORTA FEMORAL BYPASS  2011   • COLONOSCOPY N/A 8/24/2017    NBIH, diverticulosis, four 5 to 6 mm polyps (one tubular adenoma)   • EPIDURAL BLOCK     • HYSTERECTOMY  1988    endometriosis   • LEG SURGERY Bilateral     for PAD   • ROOT CANAL      R upper jaw with rootcanal       Social History     Socioeconomic History   • Marital status:      Spouse name: Not on file   • Number of children: 3   • Years of education: 12   • Highest education level: Not on file   Occupational History   • Occupation: disability for back problems   Tobacco Use   • Smoking status: Current Every Day Smoker     Years: 0.20     Types: Cigarettes, Electronic Cigarette   • Smokeless tobacco: Never Used   • Tobacco comment: Began smoking at age 10.  Smoked 1 ppd for 15 years, 2 ppd for 18 years, and 2.5 ppd for 15 years for an 88.5 pack year history.  Currently smoking 4 tobacco cigarettes  daily with frequent use of an e-cigarette.  Using e-cig since 2010.   Substance and Sexual Activity   • Alcohol use: Yes     Comment: once per year, holidays   • Drug use: No   • Sexual activity: Defer   Social History Narrative    LIVES WITH SPOUSE       Family History   Problem Relation Age of Onset   • Osteoporosis Mother    • Atrial fibrillation Mother         pacemaker   • Arthritis Mother    • Alzheimer's disease Mother    • Macular degeneration Father    • Cancer Father    • Arthritis Sister    • Gout Brother    • Arthritis Brother    • Colon cancer Maternal Aunt    • Uterine cancer Maternal Aunt    • Breast cancer Neg Hx        Review of Systems   Constitution: Positive for malaise/fatigue. Negative for chills and fever.   Cardiovascular: Positive for leg swelling. Negative for chest pain, dyspnea on exertion, near-syncope, orthopnea, palpitations, paroxysmal nocturnal dyspnea and syncope.   Respiratory: Positive for shortness of breath. Negative for cough.    Musculoskeletal: Negative for joint pain, joint swelling and myalgias.   Gastrointestinal: Negative for abdominal pain, diarrhea, melena, nausea and vomiting.   Genitourinary: Negative for frequency and hematuria.   Neurological: Positive for numbness and paresthesias. Negative for light-headedness and seizures.        Left toe numbness   Allergic/Immunologic: Negative.    All other systems reviewed and are negative.      Allergies   Allergen Reactions   • Doxycycline GI Intolerance   • Lortab [Hydrocodone-Acetaminophen] Itching         Current Outpatient Medications:   •  Acetaminophen (TYLENOL ARTHRITIS PAIN PO), Take  by mouth., Disp: , Rfl:   •  albuterol (PROVENTIL HFA;VENTOLIN HFA) 108 (90 Base) MCG/ACT inhaler, Inhale 2 puffs Every 4 (Four) Hours As Needed for Wheezing or Shortness of Air., Disp: 18 g, Rfl: 1  •  celecoxib (CeleBREX) 200 MG capsule, TAKE ONE CAPSULE BY MOUTH TWICE A DAY, Disp: 60 capsule, Rfl: 3  •  cetirizine (ZYRTEC ALLERGY)  "10 MG tablet, Take 10 mg by mouth Daily., Disp: , Rfl:   •  Cholecalciferol (VITAMIN D) 2000 units tablet, Take 2,000 Units by mouth Daily., Disp: , Rfl:   •  darifenacin (ENABLEX) 7.5 MG 24 hr tablet, Take 1 tablet by mouth Daily., Disp: 30 tablet, Rfl: 5  •  dicyclomine (BENTYL) 20 MG tablet, Take 1 tablet by mouth 4 (Four) Times a Day Before Meals & at Bedtime As Needed (cramping) for up to 30 days., Disp: 90 tablet, Rfl: 5  •  diphenhydrAMINE (BENADRYL) 25 mg capsule, Take 25 mg by mouth Every 6 (Six) Hours As Needed for Itching., Disp: , Rfl:   •  flunisolide (NASALIDE) 25 MCG/ACT (0.025%) solution nasal spray, Inhale 2 sprays Every 12 (Twelve) Hours., Disp: 3 bottle, Rfl: 3  •  FLUoxetine (PROzac) 40 MG capsule, TAKE ONE CAPSULE BY MOUTH DAILY, Disp: 30 capsule, Rfl: 5  •  fluticasone (FLONASE) 50 MCG/ACT nasal spray, 2 sprays into each nostril Daily., Disp: , Rfl:   •  guaiFENesin (MUCINEX) 600 MG 12 hr tablet, Take 1,200 mg by mouth 2 (Two) Times a Day., Disp: , Rfl:   •  HYDROcodone-acetaminophen (LORTAB) 7.5-325 MG per tablet, Take 1 tablet by mouth Every 6 (Six) Hours As Needed for Moderate Pain ., Disp: , Rfl:   •  montelukast (SINGULAIR) 10 MG tablet, TAKE ONE TABLET BY MOUTH EVERY NIGHT, Disp: 90 tablet, Rfl: 1  •  omeprazole (priLOSEC) 40 MG capsule, TAKE ONE CAPSULE BY MOUTH DAILY, Disp: 30 capsule, Rfl: 4  •  pseudoephedrine (SUDAFED) 30 MG tablet, Take 30 mg by mouth Every 4 (Four) Hours As Needed for Congestion., Disp: , Rfl:   •  Spacer/Aero Chamber Mouthpiece misc, Use spacer with all inhaled treatments, Disp: 1 each, Rfl: 1      Objective:     Vitals:    06/18/19 1247 06/18/19 1301   BP: 138/80 130/80   BP Location: Left arm Right arm   Pulse: 77 77   SpO2: 98%    Weight: 89.4 kg (197 lb)    Height: 162.6 cm (64.02\")      Body mass index is 33.79 kg/m².    PHYSICAL EXAM:    Physical Exam   Constitutional: She is oriented to person, place, and time. She appears well-developed and well-nourished. " No distress.   HENT:   Head: Normocephalic and atraumatic.   Eyes: Pupils are equal, round, and reactive to light.   Neck: No JVD present. No thyromegaly present.   Cardiovascular: Normal rate, regular rhythm, normal heart sounds and intact distal pulses.   No murmur heard.  Pulses:       Dorsalis pedis pulses are 2+ on the right side, and 2+ on the left side.        Posterior tibial pulses are 2+ on the right side, and 2+ on the left side.   Pulmonary/Chest: Effort normal and breath sounds normal. No respiratory distress.   Abdominal: Soft. Bowel sounds are normal. She exhibits no distension. There is no splenomegaly or hepatomegaly. There is no tenderness.   Musculoskeletal: Normal range of motion. She exhibits no edema.   Neurological: She is alert and oriented to person, place, and time.   Skin: Skin is warm and dry. She is not diaphoretic. No erythema.   Psychiatric: She has a normal mood and affect. Her behavior is normal. Judgment normal.         ECG 12 Lead  Date/Time: 6/18/2019 1:14 PM  Performed by: Chante Escalante APRN  Authorized by: Chante Escalante APRN   Comparison: compared with previous ECG from 6/6/2018  Rhythm: sinus rhythm  Rate: normal  BPM: 77  Conduction: right bundle branch block    Clinical impression: abnormal EKG  Comments: Indication: PAD              Assessment:       Diagnosis Plan   1. PAD (peripheral artery disease) (CMS/[facility])  ECG 12 Lead     Orders Placed This Encounter   Procedures   • ECG 12 Lead     This order was created via procedure documentation          Plan:        Overall I think she is stable from a cardiac standpoint.  She denies any symptoms of worsening peripheral arterial disease or claudication.  Unfortunately she continues to suffer from join pain despite physical therapy and epidural injections.  Her shortness of breath is chronic and unchanged.  I'm not going to make any changes to her medical regimen and she will follow-up with Dr. Lombardi in 1 year or  sooner if needed.       As always, it has been a pleasure to participate in your patient's care.      Sincerely,         ISAAK Aguilar

## 2019-06-18 NOTE — PROGRESS NOTES
Chief Complaint   Patient presents with   • IBS flare     HPI    Flaquita Cr is a  59 y.o. female here for a follow up visit for irritable bowel syndrome.  This is an established patient of Dr. Mims's, new to me.  She was last seen in the office in 2017 for complaints of abdominal pain.    Colonoscopy 8/17--- NBIH, reticulosis, polyps.  Repeat 5 years.  Begin probiotic.  Trial of Xifaxan which helped.    Following colonoscopy patient was lost to follow-up with multiple canceled appointments.    On visit today and complaint is worsening diarrhea over the past several months.  She has good days and bad days.  On a bad day she will have 6-10 liquid stools on a good day 1-2 more formed.  Denies rectal bleeding.  Denies abdominal pain but there is associated bloating and cramping.  Has not tried antispasmodics.  Currently off probiotics.  Denies antibiotic use or sick contacts.  She has been keeping a food diary with no identifiable triggers.    No nausea or vomiting.  Acid reflux seems well controlled on PPI therapy.  She does take Celebrex daily.  Tries to avoid ibuprofen.  Rarely takes Lortab for back pain.    No recent labs.  Past Medical History:   Diagnosis Date   • Allergy     Allergies   • Arthritis     failed naproxena dn meloxicam - celebrex works well   • Asthma    • Circulation problem    • Colon polyp    • COPD (chronic obstructive pulmonary disease) (CMS/[facility])     does not use O2   • Depression    • Emphysema of lung (CMS/[facility])    • Fatigue    • Gastritis    • GERD (gastroesophageal reflux disease)    • Hyperglycemia 5/30/2018   • Hyperlipidemia    • Hypertension     just started, no meds   • IBS (irritable bowel syndrome)    • Low back pain    • OAB (overactive bladder)    • PAD (peripheral artery disease) (CMS/[facility])    • Peripheral arterial disease (CMS/[facility])    • Right leg pain    • Sleep apnea        Past Surgical History:   Procedure Laterality Date   • AORTA FEMORAL BYPASS  2011   • COLONOSCOPY  N/A 8/24/2017    NBIH, diverticulosis, four 5 to 6 mm polyps (one tubular adenoma)   • EPIDURAL BLOCK     • HYSTERECTOMY  1988    endometriosis   • LEG SURGERY Bilateral     for PAD   • ROOT CANAL      R upper jaw with rootcanal       Scheduled Meds:  Outpatient Encounter Medications as of 6/18/2019   Medication Sig Dispense Refill   • Acetaminophen (TYLENOL ARTHRITIS PAIN PO) Take  by mouth.     • albuterol (PROVENTIL HFA;VENTOLIN HFA) 108 (90 Base) MCG/ACT inhaler Inhale 2 puffs Every 4 (Four) Hours As Needed for Wheezing or Shortness of Air. 18 g 1   • celecoxib (CeleBREX) 200 MG capsule TAKE ONE CAPSULE BY MOUTH TWICE A DAY 60 capsule 3   • cetirizine (ZYRTEC ALLERGY) 10 MG tablet Take 10 mg by mouth Daily.     • Cholecalciferol (VITAMIN D) 2000 units tablet Take 2,000 Units by mouth Daily.     • darifenacin (ENABLEX) 7.5 MG 24 hr tablet Take 1 tablet by mouth Daily. 30 tablet 5   • diphenhydrAMINE (BENADRYL) 25 mg capsule Take 25 mg by mouth Every 6 (Six) Hours As Needed for Itching.     • flunisolide (NASALIDE) 25 MCG/ACT (0.025%) solution nasal spray Inhale 2 sprays Every 12 (Twelve) Hours. 3 bottle 3   • FLUoxetine (PROzac) 40 MG capsule TAKE ONE CAPSULE BY MOUTH DAILY 30 capsule 5   • fluticasone (FLONASE) 50 MCG/ACT nasal spray 2 sprays into each nostril Daily.     • guaiFENesin (MUCINEX) 600 MG 12 hr tablet Take 1,200 mg by mouth 2 (Two) Times a Day.     • HYDROcodone-acetaminophen (LORTAB) 7.5-325 MG per tablet Take 1 tablet by mouth Every 6 (Six) Hours As Needed for Moderate Pain .     • montelukast (SINGULAIR) 10 MG tablet TAKE ONE TABLET BY MOUTH EVERY NIGHT 90 tablet 1   • omeprazole (priLOSEC) 40 MG capsule TAKE ONE CAPSULE BY MOUTH DAILY 30 capsule 4   • pseudoephedrine (SUDAFED) 30 MG tablet Take 30 mg by mouth Every 4 (Four) Hours As Needed for Congestion.     • Spacer/Aero Chamber Mouthpiece misc Use spacer with all inhaled treatments 1 each 1   • dicyclomine (BENTYL) 20 MG tablet Take 1 tablet  by mouth 4 (Four) Times a Day Before Meals & at Bedtime As Needed (cramping) for up to 30 days. 90 tablet 5   • [DISCONTINUED] azithromycin (ZITHROMAX) 250 MG tablet Take 1 tablet by mouth Daily.     • [DISCONTINUED] budesonide-formoterol (SYMBICORT) 160-4.5 MCG/ACT inhaler Inhale 2 puffs 2 (Two) Times a Day.       No facility-administered encounter medications on file as of 6/18/2019.        Continuous Infusions:  No current facility-administered medications for this visit.     PRN Meds:.    Allergies   Allergen Reactions   • Doxycycline GI Intolerance   • Lortab [Hydrocodone-Acetaminophen] Itching       Social History     Socioeconomic History   • Marital status:      Spouse name: Not on file   • Number of children: 3   • Years of education: 12   • Highest education level: Not on file   Occupational History   • Occupation: disability for back problems   Tobacco Use   • Smoking status: Current Every Day Smoker     Years: 0.20     Types: Cigarettes, Electronic Cigarette   • Smokeless tobacco: Never Used   • Tobacco comment: Began smoking at age 10.  Smoked 1 ppd for 15 years, 2 ppd for 18 years, and 2.5 ppd for 15 years for an 88.5 pack year history.  Currently smoking 4 tobacco cigarettes daily with frequent use of an e-cigarette.  Using e-cig since 2010.   Substance and Sexual Activity   • Alcohol use: Yes     Comment: once per year, holidays   • Drug use: No   • Sexual activity: Defer   Social History Narrative    LIVES WITH SPOUSE       Family History   Problem Relation Age of Onset   • Osteoporosis Mother    • Atrial fibrillation Mother         pacemaker   • Arthritis Mother    • Alzheimer's disease Mother    • Macular degeneration Father    • Cancer Father    • Arthritis Sister    • Gout Brother    • Arthritis Brother    • Colon cancer Maternal Aunt    • Uterine cancer Maternal Aunt    • Breast cancer Neg Hx        Review of Systems   Constitutional: Negative for activity change, appetite change,  fatigue, fever and unexpected weight change.   HENT: Negative for trouble swallowing.    Respiratory: Negative for apnea, cough, choking, chest tightness, shortness of breath and wheezing.    Cardiovascular: Negative for chest pain, palpitations and leg swelling.   Gastrointestinal: Positive for diarrhea. Negative for abdominal distention, abdominal pain, anal bleeding, blood in stool, constipation, nausea, rectal pain and vomiting.        + bloating and gas       Vitals:    06/18/19 1103   BP: 136/82   Temp: 97.4 °F (36.3 °C)       Physical Exam   Constitutional: She is oriented to person, place, and time. She appears well-developed and well-nourished.   Eyes: Pupils are equal, round, and reactive to light.   Cardiovascular: Normal rate, regular rhythm and normal heart sounds.   Pulmonary/Chest: Effort normal and breath sounds normal. No respiratory distress. She has no wheezes.   Abdominal: Soft. Bowel sounds are normal. She exhibits no distension and no mass. There is no tenderness. There is no guarding. No hernia.   Musculoskeletal: Normal range of motion.   Neurological: She is alert and oriented to person, place, and time.   Skin: Skin is warm and dry. Capillary refill takes less than 2 seconds.   Psychiatric: She has a normal mood and affect. Her behavior is normal.       No images are attached to the encounter.    Flaquita was seen today for ibs flare.    Diagnoses and all orders for this visit:    Diarrhea, unspecified type  -     CBC & Differential  -     Comprehensive Metabolic Panel  -     TSH  -     Celiac Comprehensive Panel  -     Clostridium Difficile Toxin, PCR - Stool, Per Rectum  -     Gastrointestinal Panel, PCR - Stool, Per Rectum    Gastroesophageal reflux disease, esophagitis presence not specified    Other orders  -     dicyclomine (BENTYL) 20 MG tablet; Take 1 tablet by mouth 4 (Four) Times a Day Before Meals & at Bedtime As Needed (cramping) for up to 30 days.      Impression:    This is a  pleasant 59-year-old female seen today in follow-up with worsening irritable bowel diarrhea predominant.  Given current symptoms we will proceed with stool studies (rule out infectious pathogens) and serology to rule out leukocytosis, anemia, thyroid dysfunction, and celiac disease.  Trial of antispasmodics in the interim.  If work-up negative consider treatment with Xifaxan as she has responded to this in the past.  Start daily probiotic recommend align.    GERD seems well controlled on current PPI therapy will continue.  Did caution patient against the use of NSAIDs.    Return to clinic 4 weeks.

## 2019-06-19 LAB
ALBUMIN SERPL-MCNC: 4.3 G/DL (ref 3.5–5.2)
ALBUMIN/GLOB SERPL: 1.4 G/DL
ALP SERPL-CCNC: 80 U/L (ref 39–117)
ALT SERPL-CCNC: 25 U/L (ref 1–33)
AST SERPL-CCNC: 29 U/L (ref 1–32)
BASOPHILS # BLD AUTO: 0.03 10*3/MM3 (ref 0–0.2)
BASOPHILS NFR BLD AUTO: 0.5 % (ref 0–1.5)
BILIRUB SERPL-MCNC: 0.2 MG/DL (ref 0.2–1.2)
BUN SERPL-MCNC: 20 MG/DL (ref 6–20)
BUN/CREAT SERPL: 24.1 (ref 7–25)
CALCIUM SERPL-MCNC: 9.8 MG/DL (ref 8.6–10.5)
CHLORIDE SERPL-SCNC: 103 MMOL/L (ref 98–107)
CO2 SERPL-SCNC: 24.6 MMOL/L (ref 22–29)
CREAT SERPL-MCNC: 0.83 MG/DL (ref 0.57–1)
ENDOMYSIUM IGA SER QL: NEGATIVE
EOSINOPHIL # BLD AUTO: 0.04 10*3/MM3 (ref 0–0.4)
EOSINOPHIL NFR BLD AUTO: 0.6 % (ref 0.3–6.2)
ERYTHROCYTE [DISTWIDTH] IN BLOOD BY AUTOMATED COUNT: 14.9 % (ref 12.3–15.4)
GLIADIN PEPTIDE IGA SER-ACNC: 4 UNITS (ref 0–19)
GLIADIN PEPTIDE IGG SER-ACNC: 2 UNITS (ref 0–19)
GLOBULIN SER CALC-MCNC: 3 GM/DL
GLUCOSE SERPL-MCNC: 94 MG/DL (ref 65–99)
HCT VFR BLD AUTO: 44.6 % (ref 34–46.6)
HGB BLD-MCNC: 14.2 G/DL (ref 12–15.9)
IGA SERPL-MCNC: 184 MG/DL (ref 87–352)
IMM GRANULOCYTES # BLD AUTO: 0.02 10*3/MM3 (ref 0–0.05)
IMM GRANULOCYTES NFR BLD AUTO: 0.3 % (ref 0–0.5)
LYMPHOCYTES # BLD AUTO: 2.06 10*3/MM3 (ref 0.7–3.1)
LYMPHOCYTES NFR BLD AUTO: 32.4 % (ref 19.6–45.3)
MCH RBC QN AUTO: 29.6 PG (ref 26.6–33)
MCHC RBC AUTO-ENTMCNC: 31.8 G/DL (ref 31.5–35.7)
MCV RBC AUTO: 92.9 FL (ref 79–97)
MONOCYTES # BLD AUTO: 0.61 10*3/MM3 (ref 0.1–0.9)
MONOCYTES NFR BLD AUTO: 9.6 % (ref 5–12)
NEUTROPHILS # BLD AUTO: 3.59 10*3/MM3 (ref 1.7–7)
NEUTROPHILS NFR BLD AUTO: 56.6 % (ref 42.7–76)
NRBC BLD AUTO-RTO: 0 /100 WBC (ref 0–0.2)
PLATELET # BLD AUTO: 320 10*3/MM3 (ref 140–450)
POTASSIUM SERPL-SCNC: 4.6 MMOL/L (ref 3.5–5.2)
PROT SERPL-MCNC: 7.3 G/DL (ref 6–8.5)
RBC # BLD AUTO: 4.8 10*6/MM3 (ref 3.77–5.28)
SODIUM SERPL-SCNC: 140 MMOL/L (ref 136–145)
TSH SERPL DL<=0.005 MIU/L-ACNC: 1.36 MIU/ML (ref 0.27–4.2)
TTG IGA SER-ACNC: <2 U/ML (ref 0–3)
TTG IGG SER-ACNC: <2 U/ML (ref 0–5)
WBC # BLD AUTO: 6.35 10*3/MM3 (ref 3.4–10.8)

## 2019-06-22 LAB — C DIFF TOX GENS STL QL NAA+PROBE: NEGATIVE

## 2019-06-25 LAB
ADV 40+41 DNA STL QL NAA+NON-PROBE: NOT DETECTED
ASTRO TYP 1-8 RNA STL QL NAA+NON-PROBE: NOT DETECTED
C CAYETANENSIS DNA STL QL NAA+NON-PROBE: NOT DETECTED
C COLI+JEJ+UPSA DNA STL QL NAA+NON-PROBE: NOT DETECTED
C DIF TOX TCDA+TCDB STL QL NAA+NON-PROBE: NOT DETECTED
CRYPTOSP DNA STL QL NAA+NON-PROBE: NOT DETECTED
E COLI O157 DNA STL QL NAA+NON-PROBE: NORMAL
E HISTOLYT DNA STL QL NAA+NON-PROBE: NOT DETECTED
EAEC PAA PLAS AGGR+AATA ST NAA+NON-PRB: NOT DETECTED
EC STX1+STX2 GENES STL QL NAA+NON-PROBE: NOT DETECTED
EPEC EAE GENE STL QL NAA+NON-PROBE: NOT DETECTED
ETEC LTA+ST1A+ST1B TOX ST NAA+NON-PROBE: NOT DETECTED
G LAMBLIA DNA STL QL NAA+NON-PROBE: NOT DETECTED
NOROVIRUS GI+II RNA STL QL NAA+NON-PROBE: NOT DETECTED
P SHIGELLOIDES DNA STL QL NAA+NON-PROBE: NOT DETECTED
RVA RNA STL QL NAA+NON-PROBE: NOT DETECTED
S ENT+BONG DNA STL QL NAA+NON-PROBE: NOT DETECTED
SAPO I+II+IV+V RNA STL QL NAA+NON-PROBE: NOT DETECTED
SHIGELLA SP+EIEC IPAH ST NAA+NON-PROBE: NOT DETECTED
V CHOL+PARA+VUL DNA STL QL NAA+NON-PROBE: NOT DETECTED
V CHOLERAE DNA STL QL NAA+NON-PROBE: NOT DETECTED
Y ENTEROCOL DNA STL QL NAA+NON-PROBE: NOT DETECTED

## 2019-06-25 NOTE — PROGRESS NOTES
Please let the patient of the C. difficile testing is negative.  I also ordered GI PCR panel but I do not see it was completed.  Has the patient complete this?  Is she feeling better?

## 2019-06-26 ENCOUNTER — TELEPHONE (OUTPATIENT)
Dept: GASTROENTEROLOGY | Facility: CLINIC | Age: 59
End: 2019-06-26

## 2019-06-26 NOTE — TELEPHONE ENCOUNTER
----- Message from ISAAK Augustin sent at 6/25/2019  8:10 AM EDT -----  Please let the patient of the C. difficile testing is negative.  I also ordered GI PCR panel but I do not see it was completed.  Has the patient complete this?  Is she feeling better?  Please let the patient know that lab work shows no evidence of anemia, normal kidney and liver function, normal thyroid function.  Still needs stool testing.  Thanks BG

## 2019-06-26 NOTE — TELEPHONE ENCOUNTER
Patient's phone call, advised as per Magalys's note. She states while she continues with the stomach pain, the probiotic she started last week, seems to be helping. The pain is not as bad. She states she also has made a few changes in her diet too. Advised she may want to start Jack, advised to drink 6 oz every day. Advised Jaime does have good probiotics in it too. Advised will send an update to Magalys. She verb understanding of Magalys's note and is in agreement with the plan.

## 2019-06-26 NOTE — PROGRESS NOTES
These notify patient that comprehensive panel for infectious pathogens does not show any evidence of viruses or bacteria.

## 2019-06-26 NOTE — TELEPHONE ENCOUNTER
----- Message from ISAAK Augustin sent at 6/26/2019 10:34 AM EDT -----  These notify patient that comprehensive panel for infectious pathogens does not show any evidence of viruses or bacteria.

## 2019-07-09 ENCOUNTER — TRANSCRIBE ORDERS (OUTPATIENT)
Dept: ADMINISTRATIVE | Facility: HOSPITAL | Age: 59
End: 2019-07-09

## 2019-07-09 DIAGNOSIS — R06.02 SHORTNESS OF BREATH: Primary | ICD-10-CM

## 2019-07-16 ENCOUNTER — OFFICE VISIT (OUTPATIENT)
Dept: GASTROENTEROLOGY | Facility: CLINIC | Age: 59
End: 2019-07-16

## 2019-07-16 VITALS
BODY MASS INDEX: 32.27 KG/M2 | SYSTOLIC BLOOD PRESSURE: 120 MMHG | DIASTOLIC BLOOD PRESSURE: 74 MMHG | TEMPERATURE: 98 F | HEIGHT: 64 IN | WEIGHT: 189 LBS

## 2019-07-16 DIAGNOSIS — K58.0 IRRITABLE BOWEL SYNDROME WITH DIARRHEA: Primary | ICD-10-CM

## 2019-07-16 DIAGNOSIS — K21.9 GASTROESOPHAGEAL REFLUX DISEASE, ESOPHAGITIS PRESENCE NOT SPECIFIED: ICD-10-CM

## 2019-07-16 PROCEDURE — 99213 OFFICE O/P EST LOW 20 MIN: CPT | Performed by: NURSE PRACTITIONER

## 2019-07-16 NOTE — PROGRESS NOTES
Chief Complaint   Patient presents with   • Irritable Bowel Syndrome   • Heartburn     HPI    Flaquita Cr is a  59 y.o. female here for a follow up visit for diarrhea.  This patient has a history of irritable bowel syndrome and follows with Dr. Mims.  She was last seen in the office in June for complaints of diarrhea.  At that point having good and bad days.  Stool testing was negative.  Lab work showed no evidence of anemia, normal kidney liver function, normal thyroid function.  Patient was instructed to start probiotics in combination with trial of antispasmodics.    On visit today patient reports improvement in bowel pattern after starting probiotics.  She is on a daily probiotic and now bowels are moving 2-3 times a day w/ formed stools.  Resolution of abdominal pain.  No rectal bleeding.  Patient denies nocturnal symptoms.  Patient did not require antispasmodics after starting probiotics.    No nausea, vomiting, heartburn, acid reflux, or dysphagia.  Patient continues on omeprazole 40 mg once daily.  Her appetite is excellent.  Her weight is stable.    I reviewed colonoscopy from 2017 with nonbleeding internal hemorrhoids, diverticulosis, polyps.  Repeat in 5 years for surveillance purposes.  Past Medical History:   Diagnosis Date   • Allergy     Allergies   • Arthritis     failed naproxena dn meloxicam - celebrex works well   • Asthma    • Circulation problem    • Colon polyp    • COPD (chronic obstructive pulmonary disease) (CMS/HCC)     does not use O2   • Depression    • Emphysema of lung (CMS/HCC)    • Fatigue    • Gastritis    • GERD (gastroesophageal reflux disease)    • Hyperglycemia 5/30/2018   • Hyperlipidemia    • Hypertension     just started, no meds   • IBS (irritable bowel syndrome)    • Low back pain    • OAB (overactive bladder)    • PAD (peripheral artery disease) (CMS/HCC)    • Peripheral arterial disease (CMS/HCC)    • Right leg pain    • Sleep apnea        Past Surgical History:    Procedure Laterality Date   • AORTA FEMORAL BYPASS  2011   • COLONOSCOPY N/A 8/24/2017    NBIH, diverticulosis, four 5 to 6 mm polyps (one tubular adenoma)   • EPIDURAL BLOCK     • HYSTERECTOMY  1988    endometriosis   • LEG SURGERY Bilateral     for PAD   • ROOT CANAL      R upper jaw with rootcanal       Scheduled Meds:  Outpatient Encounter Medications as of 7/16/2019   Medication Sig Dispense Refill   • Acetaminophen (TYLENOL ARTHRITIS PAIN PO) Take  by mouth.     • albuterol (PROVENTIL HFA;VENTOLIN HFA) 108 (90 Base) MCG/ACT inhaler Inhale 2 puffs Every 4 (Four) Hours As Needed for Wheezing or Shortness of Air. 18 g 1   • celecoxib (CeleBREX) 200 MG capsule TAKE ONE CAPSULE BY MOUTH TWICE A DAY 60 capsule 3   • cetirizine (ZYRTEC ALLERGY) 10 MG tablet Take 10 mg by mouth Daily.     • Cholecalciferol (VITAMIN D) 2000 units tablet Take 2,000 Units by mouth Daily.     • darifenacin (ENABLEX) 7.5 MG 24 hr tablet Take 1 tablet by mouth Daily. 30 tablet 5   • diphenhydrAMINE (BENADRYL) 25 mg capsule Take 25 mg by mouth Every 6 (Six) Hours As Needed for Itching.     • flunisolide (NASALIDE) 25 MCG/ACT (0.025%) solution nasal spray Inhale 2 sprays Every 12 (Twelve) Hours. 3 bottle 3   • fluticasone (FLONASE) 50 MCG/ACT nasal spray 2 sprays into each nostril Daily.     • guaiFENesin (MUCINEX) 600 MG 12 hr tablet Take 1,200 mg by mouth 2 (Two) Times a Day.     • HYDROcodone-acetaminophen (LORTAB) 7.5-325 MG per tablet Take 1 tablet by mouth Every 6 (Six) Hours As Needed for Moderate Pain .     • montelukast (SINGULAIR) 10 MG tablet TAKE ONE TABLET BY MOUTH EVERY NIGHT 90 tablet 1   • omeprazole (priLOSEC) 40 MG capsule TAKE ONE CAPSULE BY MOUTH DAILY 30 capsule 4   • pseudoephedrine (SUDAFED) 30 MG tablet Take 30 mg by mouth Every 4 (Four) Hours As Needed for Congestion.     • Spacer/Aero Chamber Mouthpiece misc Use spacer with all inhaled treatments 1 each 1   • dicyclomine (BENTYL) 20 MG tablet Take 1 tablet by mouth  4 (Four) Times a Day Before Meals & at Bedtime As Needed (cramping) for up to 30 days. 90 tablet 5   • FLUoxetine (PROzac) 40 MG capsule TAKE ONE CAPSULE BY MOUTH DAILY 30 capsule 5     No facility-administered encounter medications on file as of 7/16/2019.        Continuous Infusions:  No current facility-administered medications for this visit.     PRN Meds:.    Allergies   Allergen Reactions   • Doxycycline GI Intolerance   • Lortab [Hydrocodone-Acetaminophen] Itching       Social History     Socioeconomic History   • Marital status:      Spouse name: Not on file   • Number of children: 3   • Years of education: 12   • Highest education level: Not on file   Occupational History   • Occupation: disability for back problems   Tobacco Use   • Smoking status: Current Every Day Smoker     Years: 0.20     Types: Electronic Cigarette   • Smokeless tobacco: Never Used   • Tobacco comment: Began smoking at age 10.  Smoked 1 ppd for 15 years, 2 ppd for 18 years, and 2.5 ppd for 15 years for an 88.5 pack year history.  Currently smoking 4 tobacco cigarettes daily with frequent use of an e-cigarette.  Using e-cig since 2010.   Substance and Sexual Activity   • Alcohol use: Yes     Comment: once per year, holidays   • Drug use: No   • Sexual activity: Defer   Social History Narrative    LIVES WITH SPOUSE       Family History   Problem Relation Age of Onset   • Osteoporosis Mother    • Atrial fibrillation Mother         pacemaker   • Arthritis Mother    • Alzheimer's disease Mother    • Macular degeneration Father    • Cancer Father    • Arthritis Sister    • Gout Brother    • Arthritis Brother    • Colon cancer Maternal Aunt    • Uterine cancer Maternal Aunt    • Breast cancer Neg Hx        Review of Systems   Constitutional: Negative for activity change, appetite change, fatigue, fever and unexpected weight change.   HENT: Negative for trouble swallowing.    Respiratory: Negative for apnea, cough, choking, chest  tightness, shortness of breath and wheezing.    Cardiovascular: Negative for chest pain, palpitations and leg swelling.   Gastrointestinal: Negative for abdominal distention, abdominal pain, anal bleeding, blood in stool, constipation, diarrhea, nausea, rectal pain and vomiting.       Vitals:    07/16/19 1336   BP: 120/74   Temp: 98 °F (36.7 °C)       Physical Exam   Constitutional: She is oriented to person, place, and time. She appears well-developed and well-nourished.   Eyes: Pupils are equal, round, and reactive to light.   Cardiovascular: Normal rate, regular rhythm and normal heart sounds.   Pulmonary/Chest: Effort normal and breath sounds normal. No respiratory distress. She has no wheezes.   Abdominal: Soft. Bowel sounds are normal. She exhibits no distension and no mass. There is no tenderness. There is no guarding. No hernia.   Musculoskeletal: Normal range of motion.   Neurological: She is alert and oriented to person, place, and time.   Skin: Skin is warm and dry. Capillary refill takes less than 2 seconds.   Psychiatric: She has a normal mood and affect. Her behavior is normal.       No images are attached to the encounter.    Flaquita was seen today for irritable bowel syndrome and heartburn.    Diagnoses and all orders for this visit:    Irritable bowel syndrome with diarrhea    Gastroesophageal reflux disease, esophagitis presence not specified    Impression:    This is a pleasant 59-year-old female seen today for irritable bowel syndrome diarrhea and GERD.  Patient has responded nicely to daily probiotic.  We had a long discussion regarding the triggers for irritable bowel syndrome.  At this point recommend she continue probiotic and use antispasmodics as needed.  If symptoms return/worsen consider trial of Xifaxan as this has helped in the past.  Patient up-to-date in terms of colon cancer screening.    Regarding GERD, currently well controlled on daily PPI therapy.  We discussed GERD diet  lifestyle modifications.  I provided her with educational handout.  Cautioned against NSAID use.    For now continue current medication regimen and follow-up in 6 months.

## 2019-07-23 ENCOUNTER — HOSPITAL ENCOUNTER (OUTPATIENT)
Dept: CARDIOLOGY | Facility: HOSPITAL | Age: 59
Discharge: HOME | End: 2019-07-23
Admitting: INTERNAL MEDICINE

## 2019-07-23 VITALS
HEIGHT: 64 IN | BODY MASS INDEX: 32.27 KG/M2 | WEIGHT: 189 LBS | DIASTOLIC BLOOD PRESSURE: 60 MMHG | SYSTOLIC BLOOD PRESSURE: 122 MMHG | OXYGEN SATURATION: 92 %

## 2019-07-23 DIAGNOSIS — R06.02 SHORTNESS OF BREATH: ICD-10-CM

## 2019-07-23 LAB
AORTIC ARCH: 2.5 CM
AORTIC ROOT ANNULUS: 2 CM
ASCENDING AORTA: 2.8 CM
BH CV ECHO MEAS - ACS: 1.6 CM
BH CV ECHO MEAS - AO MAX PG (FULL): 4.6 MMHG
BH CV ECHO MEAS - AO MAX PG: 8.8 MMHG
BH CV ECHO MEAS - AO MEAN PG (FULL): 1.8 MMHG
BH CV ECHO MEAS - AO MEAN PG: 4.7 MMHG
BH CV ECHO MEAS - AO V2 MAX: 148.4 CM/SEC
BH CV ECHO MEAS - AO V2 MEAN: 100.2 CM/SEC
BH CV ECHO MEAS - AO V2 VTI: 26.5 CM
BH CV ECHO MEAS - ASC AORTA: 2.8 CM
BH CV ECHO MEAS - AVA(I,A): 1.9 CM^2
BH CV ECHO MEAS - AVA(I,D): 1.9 CM^2
BH CV ECHO MEAS - AVA(V,A): 1.7 CM^2
BH CV ECHO MEAS - AVA(V,D): 1.7 CM^2
BH CV ECHO MEAS - BSA(HAYCOCK): 2 M^2
BH CV ECHO MEAS - BSA: 1.9 M^2
BH CV ECHO MEAS - BZI_BMI: 32.4 KILOGRAMS/M^2
BH CV ECHO MEAS - BZI_METRIC_HEIGHT: 162.6 CM
BH CV ECHO MEAS - BZI_METRIC_WEIGHT: 85.7 KG
BH CV ECHO MEAS - EDV(MOD-SP2): 48 ML
BH CV ECHO MEAS - EDV(MOD-SP4): 52 ML
BH CV ECHO MEAS - EDV(TEICH): 66.3 ML
BH CV ECHO MEAS - EF(CUBED): 77.2 %
BH CV ECHO MEAS - EF(MOD-BP): 65 %
BH CV ECHO MEAS - EF(MOD-SP2): 68.8 %
BH CV ECHO MEAS - EF(MOD-SP4): 59.6 %
BH CV ECHO MEAS - EF(TEICH): 69.9 %
BH CV ECHO MEAS - ESV(MOD-SP2): 15 ML
BH CV ECHO MEAS - ESV(MOD-SP4): 21 ML
BH CV ECHO MEAS - ESV(TEICH): 19.9 ML
BH CV ECHO MEAS - FS: 38.9 %
BH CV ECHO MEAS - IVS/LVPW: 0.89
BH CV ECHO MEAS - IVSD: 1 CM
BH CV ECHO MEAS - LAT PEAK E' VEL: 5 CM/SEC
BH CV ECHO MEAS - LV DIASTOLIC VOL/BSA (35-75): 27.2 ML/M^2
BH CV ECHO MEAS - LV MASS(C)D: 142 GRAMS
BH CV ECHO MEAS - LV MASS(C)DI: 74.4 GRAMS/M^2
BH CV ECHO MEAS - LV MAX PG: 4.2 MMHG
BH CV ECHO MEAS - LV MEAN PG: 2.9 MMHG
BH CV ECHO MEAS - LV SYSTOLIC VOL/BSA (12-30): 11 ML/M^2
BH CV ECHO MEAS - LV V1 MAX: 102.8 CM/SEC
BH CV ECHO MEAS - LV V1 MEAN: 81 CM/SEC
BH CV ECHO MEAS - LV V1 VTI: 20.5 CM
BH CV ECHO MEAS - LVIDD: 3.9 CM
BH CV ECHO MEAS - LVIDS: 2.4 CM
BH CV ECHO MEAS - LVLD AP2: 6.6 CM
BH CV ECHO MEAS - LVLD AP4: 6.5 CM
BH CV ECHO MEAS - LVLS AP2: 4.5 CM
BH CV ECHO MEAS - LVLS AP4: 5.5 CM
BH CV ECHO MEAS - LVOT AREA (M): 2.5 CM^2
BH CV ECHO MEAS - LVOT AREA: 2.5 CM^2
BH CV ECHO MEAS - LVOT DIAM: 1.8 CM
BH CV ECHO MEAS - LVPWD: 1.2 CM
BH CV ECHO MEAS - MED PEAK E' VEL: 5 CM/SEC
BH CV ECHO MEAS - MV A DUR: 0.1 SEC
BH CV ECHO MEAS - MV A MAX VEL: 88.3 CM/SEC
BH CV ECHO MEAS - MV DEC SLOPE: 602.1 CM/SEC^2
BH CV ECHO MEAS - MV DEC TIME: 0.24 SEC
BH CV ECHO MEAS - MV E MAX VEL: 77.1 CM/SEC
BH CV ECHO MEAS - MV E/A: 0.87
BH CV ECHO MEAS - MV MAX PG: 4.2 MMHG
BH CV ECHO MEAS - MV MEAN PG: 2.3 MMHG
BH CV ECHO MEAS - MV P1/2T MAX VEL: 91.2 CM/SEC
BH CV ECHO MEAS - MV P1/2T: 44.4 MSEC
BH CV ECHO MEAS - MV V2 MAX: 102.1 CM/SEC
BH CV ECHO MEAS - MV V2 MEAN: 70.9 CM/SEC
BH CV ECHO MEAS - MV V2 VTI: 25.2 CM
BH CV ECHO MEAS - MVA P1/2T LCG: 2.4 CM^2
BH CV ECHO MEAS - MVA(P1/2T): 5 CM^2
BH CV ECHO MEAS - MVA(VTI): 2 CM^2
BH CV ECHO MEAS - PA ACC TIME: 0.08 SEC
BH CV ECHO MEAS - PA MAX PG (FULL): 2.1 MMHG
BH CV ECHO MEAS - PA MAX PG: 5.1 MMHG
BH CV ECHO MEAS - PA PR(ACCEL): 41 MMHG
BH CV ECHO MEAS - PA V2 MAX: 112.7 CM/SEC
BH CV ECHO MEAS - PULM A REVS DUR: 0.1 SEC
BH CV ECHO MEAS - PULM A REVS VEL: 28.1 CM/SEC
BH CV ECHO MEAS - PULM DIAS VEL: 45.6 CM/SEC
BH CV ECHO MEAS - PULM S/D: 1.3
BH CV ECHO MEAS - PULM SYS VEL: 59.7 CM/SEC
BH CV ECHO MEAS - PVA(V,A): 2.4 CM^2
BH CV ECHO MEAS - PVA(V,D): 2.4 CM^2
BH CV ECHO MEAS - QP/QS: 0.91
BH CV ECHO MEAS - RAP SYSTOLE: 3 MMHG
BH CV ECHO MEAS - RV MAX PG: 3 MMHG
BH CV ECHO MEAS - RV MEAN PG: 1.7 MMHG
BH CV ECHO MEAS - RV V1 MAX: 86.3 CM/SEC
BH CV ECHO MEAS - RV V1 MEAN: 60.2 CM/SEC
BH CV ECHO MEAS - RV V1 VTI: 15 CM
BH CV ECHO MEAS - RVOT AREA: 3.1 CM^2
BH CV ECHO MEAS - RVOT DIAM: 2 CM
BH CV ECHO MEAS - RVSP: 14 MMHG
BH CV ECHO MEAS - SI(CUBED): 24.1 ML/M^2
BH CV ECHO MEAS - SI(LVOT): 26.9 ML/M^2
BH CV ECHO MEAS - SI(MOD-SP2): 17.3 ML/M^2
BH CV ECHO MEAS - SI(MOD-SP4): 16.2 ML/M^2
BH CV ECHO MEAS - SI(TEICH): 24.3 ML/M^2
BH CV ECHO MEAS - SUP REN AO DIAM: 1.9 CM
BH CV ECHO MEAS - SV(CUBED): 46.1 ML
BH CV ECHO MEAS - SV(LVOT): 51.3 ML
BH CV ECHO MEAS - SV(MOD-SP2): 33 ML
BH CV ECHO MEAS - SV(MOD-SP4): 31 ML
BH CV ECHO MEAS - SV(RVOT): 46.5 ML
BH CV ECHO MEAS - SV(TEICH): 46.3 ML
BH CV ECHO MEAS - TAPSE (>1.6): 2.1 CM2
BH CV ECHO MEAS - TR MAX VEL: 162.9 CM/SEC
BH CV ECHO MEASUREMENTS AVERAGE E/E' RATIO: 15.42
BH CV XLRA - RV BASE: 2.4 CM
BH CV XLRA - TDI S': 10 CM/SEC
LEFT ATRIUM VOLUME INDEX: 18 ML/M2
SINUS: 2.8 CM
STJ: 2.8 CM

## 2019-07-23 PROCEDURE — 93306 TTE W/DOPPLER COMPLETE: CPT

## 2019-07-23 PROCEDURE — 93306 TTE W/DOPPLER COMPLETE: CPT | Performed by: INTERNAL MEDICINE

## 2019-08-02 ENCOUNTER — TELEPHONE (OUTPATIENT)
Dept: CARDIOLOGY | Facility: CLINIC | Age: 59
End: 2019-08-02

## 2019-08-02 NOTE — TELEPHONE ENCOUNTER
Pt called. She saw Dr. Bright in the past for PAD. However, Dr. Umanzor ordered an echo on her for shortness of breath on 7/23/19. The results are in Epic.  She would like us to look at the results and see if she needs to see Dr. Lombardi sooner for her heart as well as her PAD. She saw Chante but is not scheduled to see us until 6/24/2020.    Please advise.    Thanks,  Nuzhat

## 2019-08-22 ENCOUNTER — TELEPHONE (OUTPATIENT)
Dept: INTERNAL MEDICINE | Facility: CLINIC | Age: 59
End: 2019-08-22

## 2019-08-22 NOTE — TELEPHONE ENCOUNTER
----- Message from ISAAK Bruner sent at 8/22/2019 12:48 PM EDT -----  Regarding: RE: MED REQUEST  Ok please take prozac off med list, sertraline 50 mg PO once daily #30, 5 refills  ----- Message -----  From: Cody Fragoso  Sent: 8/22/2019  12:24 PM  To: ISAAK Bruner  Subject: FW: MED REQUEST                                   Should patient schedule appt?  ----- Message -----  From: Yanelis Coelho  Sent: 8/22/2019   9:48 AM  To: Cody Fragoso  Subject: MED REQUEST                                      Patient quit taking her Prozac a month ago. She would like to have Cadence send in a script for Zoloft to the [facility]. She said her daughter takes it and she found out that it works better for anxiety than Prozac. Please call her to discuss if there are questions. Thanks

## 2019-08-27 RX ORDER — OMEPRAZOLE 40 MG/1
CAPSULE, DELAYED RELEASE ORAL
Qty: 30 CAPSULE | Refills: 1 | Status: SHIPPED | OUTPATIENT
Start: 2019-08-27 | End: 2019-11-05 | Stop reason: SDUPTHER

## 2019-08-27 RX ORDER — OMEPRAZOLE 40 MG/1
CAPSULE, DELAYED RELEASE ORAL
Qty: 30 CAPSULE | Refills: 3 | OUTPATIENT
Start: 2019-08-27

## 2019-09-03 RX ORDER — CELECOXIB 200 MG/1
CAPSULE ORAL
Qty: 60 CAPSULE | Refills: 0 | Status: SHIPPED | OUTPATIENT
Start: 2019-09-03 | End: 2019-10-10 | Stop reason: SDUPTHER

## 2019-09-04 RX ORDER — CELECOXIB 200 MG/1
CAPSULE ORAL
Qty: 60 CAPSULE | Refills: 2 | OUTPATIENT
Start: 2019-09-04

## 2019-09-18 DIAGNOSIS — Z00.00 ANNUAL PHYSICAL EXAM: Primary | ICD-10-CM

## 2019-09-18 DIAGNOSIS — I15.9 SECONDARY HYPERTENSION: ICD-10-CM

## 2019-09-18 DIAGNOSIS — E78.5 HYPERLIPIDEMIA, UNSPECIFIED HYPERLIPIDEMIA TYPE: ICD-10-CM

## 2019-09-18 DIAGNOSIS — E55.9 VITAMIN D DEFICIENCY: ICD-10-CM

## 2019-09-18 DIAGNOSIS — R73.03 PREDIABETES: ICD-10-CM

## 2019-09-19 LAB
25(OH)D3+25(OH)D2 SERPL-MCNC: 48 NG/ML (ref 30–100)
ALBUMIN SERPL-MCNC: 4.6 G/DL (ref 3.5–5.2)
ALBUMIN/GLOB SERPL: 1.4 G/DL
ALP SERPL-CCNC: 95 U/L (ref 39–117)
ALT SERPL-CCNC: 23 U/L (ref 1–33)
AST SERPL-CCNC: 26 U/L (ref 1–32)
BILIRUB SERPL-MCNC: 0.2 MG/DL (ref 0.2–1.2)
BUN SERPL-MCNC: 21 MG/DL (ref 6–20)
BUN/CREAT SERPL: 22.1 (ref 7–25)
CALCIUM SERPL-MCNC: 10.3 MG/DL (ref 8.6–10.5)
CHLORIDE SERPL-SCNC: 99 MMOL/L (ref 98–107)
CHOLEST SERPL-MCNC: 262 MG/DL (ref 0–200)
CO2 SERPL-SCNC: 27.3 MMOL/L (ref 22–29)
CREAT SERPL-MCNC: 0.95 MG/DL (ref 0.57–1)
GLOBULIN SER CALC-MCNC: 3.2 GM/DL
GLUCOSE SERPL-MCNC: 109 MG/DL (ref 65–99)
HBA1C MFR BLD: 6.4 % (ref 4.8–5.6)
HDLC SERPL-MCNC: 51 MG/DL (ref 40–60)
LDLC SERPL CALC-MCNC: 172 MG/DL (ref 0–100)
LDLC/HDLC SERPL: 3.37 {RATIO}
POTASSIUM SERPL-SCNC: 5.5 MMOL/L (ref 3.5–5.2)
PROT SERPL-MCNC: 7.8 G/DL (ref 6–8.5)
SODIUM SERPL-SCNC: 141 MMOL/L (ref 136–145)
TRIGL SERPL-MCNC: 196 MG/DL (ref 0–150)
TSH SERPL DL<=0.005 MIU/L-ACNC: 2.54 UIU/ML (ref 0.27–4.2)
VLDLC SERPL CALC-MCNC: 39.2 MG/DL

## 2019-09-20 RX ORDER — DARIFENACIN HYDROBROMIDE 7.5 MG/1
TABLET, EXTENDED RELEASE ORAL
Qty: 30 TABLET | Refills: 0 | Status: SHIPPED | OUTPATIENT
Start: 2019-09-20 | End: 2019-09-25 | Stop reason: SDUPTHER

## 2019-09-25 ENCOUNTER — OFFICE VISIT (OUTPATIENT)
Dept: INTERNAL MEDICINE | Facility: CLINIC | Age: 59
End: 2019-09-25

## 2019-09-25 VITALS
HEART RATE: 92 BPM | SYSTOLIC BLOOD PRESSURE: 140 MMHG | DIASTOLIC BLOOD PRESSURE: 78 MMHG | WEIGHT: 190.6 LBS | TEMPERATURE: 97.8 F | HEIGHT: 64 IN | OXYGEN SATURATION: 95 % | BODY MASS INDEX: 32.54 KG/M2

## 2019-09-25 DIAGNOSIS — R73.03 PREDIABETES: ICD-10-CM

## 2019-09-25 DIAGNOSIS — E78.5 HYPERLIPIDEMIA, UNSPECIFIED HYPERLIPIDEMIA TYPE: ICD-10-CM

## 2019-09-25 DIAGNOSIS — G89.29 CHRONIC NONINTRACTABLE HEADACHE, UNSPECIFIED HEADACHE TYPE: ICD-10-CM

## 2019-09-25 DIAGNOSIS — M51.36 DDD (DEGENERATIVE DISC DISEASE), LUMBAR: ICD-10-CM

## 2019-09-25 DIAGNOSIS — F17.200 CURRENT EVERY DAY SMOKER: ICD-10-CM

## 2019-09-25 DIAGNOSIS — Z78.0 POST-MENOPAUSE: ICD-10-CM

## 2019-09-25 DIAGNOSIS — F32.A DEPRESSION, UNSPECIFIED DEPRESSION TYPE: ICD-10-CM

## 2019-09-25 DIAGNOSIS — R03.0 ELEVATED BLOOD PRESSURE READING: ICD-10-CM

## 2019-09-25 DIAGNOSIS — M54.2 NECK PAIN: ICD-10-CM

## 2019-09-25 DIAGNOSIS — N32.81 OVERACTIVE BLADDER: ICD-10-CM

## 2019-09-25 DIAGNOSIS — E87.5 HYPERKALEMIA: ICD-10-CM

## 2019-09-25 DIAGNOSIS — Z00.00 HEALTH CARE MAINTENANCE: Primary | ICD-10-CM

## 2019-09-25 DIAGNOSIS — R51.9 CHRONIC NONINTRACTABLE HEADACHE, UNSPECIFIED HEADACHE TYPE: ICD-10-CM

## 2019-09-25 PROCEDURE — 99396 PREV VISIT EST AGE 40-64: CPT | Performed by: NURSE PRACTITIONER

## 2019-09-25 RX ORDER — DULOXETIN HYDROCHLORIDE 30 MG/1
30 CAPSULE, DELAYED RELEASE ORAL DAILY
Qty: 30 CAPSULE | Refills: 6 | Status: SHIPPED | OUTPATIENT
Start: 2019-09-25 | End: 2020-05-07

## 2019-09-25 RX ORDER — DARIFENACIN HYDROBROMIDE 15 MG/1
15 TABLET, EXTENDED RELEASE ORAL DAILY
Qty: 30 TABLET | Refills: 6 | Status: SHIPPED | OUTPATIENT
Start: 2019-09-25 | End: 2020-06-30

## 2019-09-25 NOTE — PROGRESS NOTES
"Kye Cr is a 59 y.o. female.     History of Present Illness   The patient is here today for CPE and lab work F/U. Down 7 lbs from last visit. She is trying to eat really healthy. Exercising is up because pain was improved but now limited again due to pain.     Headaches- more frequently. Some with sinus pressure some with pain at base of head and neck. She does have some back pain that was relieved by chiropractor but ran out of insurance coverage. Massage therapy was really helping her back, was walking better and headaches were gone.     Anxiety and depression- has failed zoloft and lexapro, \"I have been on a lot of meds and they have not worked.\"     She also notes some light headedness and diaphoresis intermittently. BP has been running higher, getting systolic 105-137.   AR- to see allergist tomorrow, would like to do allergy shots again, affecting her sleep.  GI- started her on a protobiotic, yamil brand  OAB- enablex works great for only half a day    Current every day smoker- vaping nicotine 3 %.   The following portions of the patient's history were reviewed and updated as appropriate: allergies, current medications, past family history, past medical history, past social history, past surgical history and problem list.    Review of Systems   Constitutional: Positive for fatigue. Negative for chills and fever.   HENT: Positive for congestion, rhinorrhea, sinus pressure, sneezing and sore throat. Negative for ear pain.    Eyes: Negative.    Respiratory: Positive for cough (intermittent), shortness of breath (intermittent) and wheezing (intermittent).    Cardiovascular: Negative.    Gastrointestinal: Negative.    Endocrine: Negative for cold intolerance and heat intolerance.   Genitourinary: Positive for frequency and urgency. Negative for breast discharge, breast lump, breast pain, difficulty urinating, dyspareunia, dysuria, flank pain, genital sores, hematuria and pelvic pain. "   Musculoskeletal: Positive for back pain.   Skin: Negative.    Allergic/Immunologic: Positive for environmental allergies. Negative for food allergies.   Neurological: Positive for headache.   Hematological: Negative.    Psychiatric/Behavioral: Negative for dysphoric mood and suicidal ideas. The patient is nervous/anxious.        Objective   Physical Exam   Constitutional: She is oriented to person, place, and time. Vital signs are normal. She appears well-developed and well-nourished. She is cooperative.   Body mass index is 32.7 kg/m².     HENT:   Right Ear: Hearing, tympanic membrane, external ear and ear canal normal.   Left Ear: Hearing, tympanic membrane, external ear and ear canal normal.   Nose: Nose normal.   Mouth/Throat: Uvula is midline, oropharynx is clear and moist and mucous membranes are normal.   Eyes: Conjunctivae, EOM and lids are normal. Pupils are equal, round, and reactive to light.   Neck: Trachea normal and normal range of motion. Carotid bruit is not present. No thyroid mass and no thyromegaly present.   Cardiovascular: Normal rate, regular rhythm, normal heart sounds and normal pulses.   Pulmonary/Chest: Effort normal and breath sounds normal.   Abdominal: Soft. Normal appearance, normal aorta and bowel sounds are normal. There is no hepatosplenomegaly. There is no tenderness.   Musculoskeletal: Normal range of motion.   Lymphadenopathy:     She has no cervical adenopathy.     She has no axillary adenopathy. No inguinal adenopathy noted on the right or left side.        Right: No inguinal and no supraclavicular adenopathy present.        Left: No inguinal and no supraclavicular adenopathy present.   Neurological: She is alert and oriented to person, place, and time. She has normal strength. No cranial nerve deficit or sensory deficit. She displays a negative Romberg sign. GCS eye subscore is 4. GCS verbal subscore is 5. GCS motor subscore is 6.   Reflex Scores:       Patellar reflexes are  0 on the right side and 0 on the left side.  Skin: Skin is warm, dry and intact.   Psychiatric: She has a normal mood and affect. Her speech is normal and behavior is normal. Judgment and thought content normal. Cognition and memory are normal.       Assessment/Plan   There are no diagnoses linked to this encounter.             1. Preventative counseling- continue to work on wt loss  2. Current every day smoker- quit smoking, check CT screening  3. Anxiety and depression- ok to return to prozac if needed but will try cymbalta first due to pain. Pt will call in 1 month if increase in dosage needed.   4. Elevated blood pressure- stop sudafed  3. DDD- order PT for help with movement.   4. OAB- try enablex 15 mg daily  5. Hyperkalemia- recheck today  6. HPL- suggest statin, pt defers currently   7. Prediabetes- suggest metformin, pt defers, watch sugars and carbs.      Dentist- due  Shingrix and flu vaccine- discussed  F/U in 3 months to check on sugar and lipids

## 2019-10-10 RX ORDER — CELECOXIB 200 MG/1
CAPSULE ORAL
Qty: 60 CAPSULE | Refills: 1 | Status: SHIPPED | OUTPATIENT
Start: 2019-10-10 | End: 2019-12-24

## 2019-10-22 RX ORDER — MONTELUKAST SODIUM 10 MG/1
TABLET ORAL
Qty: 90 TABLET | Refills: 0 | Status: SHIPPED | OUTPATIENT
Start: 2019-10-22 | End: 2020-02-14

## 2019-11-05 ENCOUNTER — TELEPHONE (OUTPATIENT)
Dept: INTERNAL MEDICINE | Facility: CLINIC | Age: 59
End: 2019-11-05

## 2019-11-05 RX ORDER — FLUCONAZOLE 150 MG/1
150 TABLET ORAL ONCE
Qty: 1 TABLET | Refills: 0 | Status: SHIPPED | OUTPATIENT
Start: 2019-11-05 | End: 2019-11-05

## 2019-11-05 RX ORDER — OMEPRAZOLE 40 MG/1
CAPSULE, DELAYED RELEASE ORAL
Qty: 90 CAPSULE | Refills: 0 | Status: SHIPPED | OUTPATIENT
Start: 2019-11-05 | End: 2020-02-17

## 2019-11-05 NOTE — TELEPHONE ENCOUNTER
----- Message from Onelia Laguerre Rep sent at 11/4/2019  3:48 PM EST -----  Contact: [phone]  Pt called because she was given a steroid back because of congestion. She believes that this gave her a yeast infection.     I told her that she was going to have to schedule but she laughed and said she didn't want to come in and just wanted me to ask       [facility]: Ailyn

## 2019-12-24 RX ORDER — CELECOXIB 200 MG/1
CAPSULE ORAL
Qty: 60 CAPSULE | Refills: 0 | Status: SHIPPED | OUTPATIENT
Start: 2019-12-24 | End: 2020-02-03

## 2020-02-03 RX ORDER — CELECOXIB 200 MG/1
CAPSULE ORAL
Qty: 60 CAPSULE | Refills: 0 | Status: SHIPPED | OUTPATIENT
Start: 2020-02-03 | End: 2020-03-05

## 2020-02-10 RX ORDER — MONTELUKAST SODIUM 10 MG/1
TABLET ORAL
Qty: 30 TABLET | Refills: 0 | OUTPATIENT
Start: 2020-02-10

## 2020-02-12 DIAGNOSIS — R73.03 PREDIABETES: ICD-10-CM

## 2020-02-12 DIAGNOSIS — E78.5 HYPERLIPIDEMIA, UNSPECIFIED HYPERLIPIDEMIA TYPE: Primary | ICD-10-CM

## 2020-02-14 RX ORDER — MONTELUKAST SODIUM 10 MG/1
TABLET ORAL
Qty: 90 TABLET | Refills: 0 | Status: SHIPPED | OUTPATIENT
Start: 2020-02-14 | End: 2020-04-14 | Stop reason: SDUPTHER

## 2020-02-14 NOTE — TELEPHONE ENCOUNTER
----- Message from Beata Garcia sent at 2/14/2020 12:36 PM EST -----  Patient is out of her medication and is needing a refill RX sent to [facility] for:   montelukast (SINGULAIR) 10 MG tablet

## 2020-02-17 RX ORDER — OMEPRAZOLE 40 MG/1
CAPSULE, DELAYED RELEASE ORAL
Qty: 30 CAPSULE | Refills: 0 | Status: SHIPPED | OUTPATIENT
Start: 2020-02-17 | End: 2020-02-20 | Stop reason: SDUPTHER

## 2020-02-20 ENCOUNTER — OFFICE VISIT (OUTPATIENT)
Dept: INTERNAL MEDICINE | Facility: CLINIC | Age: 60
End: 2020-02-20

## 2020-02-20 VITALS
DIASTOLIC BLOOD PRESSURE: 70 MMHG | OXYGEN SATURATION: 96 % | HEIGHT: 64 IN | TEMPERATURE: 97.8 F | HEART RATE: 93 BPM | SYSTOLIC BLOOD PRESSURE: 144 MMHG | BODY MASS INDEX: 32.95 KG/M2 | WEIGHT: 193 LBS

## 2020-02-20 DIAGNOSIS — R10.30 LOWER ABDOMINAL PAIN: ICD-10-CM

## 2020-02-20 DIAGNOSIS — R73.03 PREDIABETES: ICD-10-CM

## 2020-02-20 DIAGNOSIS — G89.29 CHRONIC BILATERAL LOW BACK PAIN WITH LEFT-SIDED SCIATICA: ICD-10-CM

## 2020-02-20 DIAGNOSIS — M54.42 CHRONIC BILATERAL LOW BACK PAIN WITH LEFT-SIDED SCIATICA: ICD-10-CM

## 2020-02-20 DIAGNOSIS — R09.81 HEAD CONGESTION: ICD-10-CM

## 2020-02-20 DIAGNOSIS — M51.36 DDD (DEGENERATIVE DISC DISEASE), LUMBAR: Primary | ICD-10-CM

## 2020-02-20 PROCEDURE — 99214 OFFICE O/P EST MOD 30 MIN: CPT | Performed by: NURSE PRACTITIONER

## 2020-02-20 RX ORDER — NALOXONE HYDROCHLORIDE 4 MG/.1ML
SPRAY NASAL
COMMUNITY
Start: 2020-01-24 | End: 2020-05-07

## 2020-02-20 RX ORDER — OMEPRAZOLE 40 MG/1
40 CAPSULE, DELAYED RELEASE ORAL 2 TIMES DAILY
Qty: 60 CAPSULE | Refills: 2 | Status: SHIPPED | OUTPATIENT
Start: 2020-02-20 | End: 2020-06-19

## 2020-02-20 RX ORDER — ORPHENADRINE CITRATE 100 MG/1
100 TABLET, EXTENDED RELEASE ORAL 2 TIMES DAILY
COMMUNITY
End: 2020-02-24 | Stop reason: SDUPTHER

## 2020-02-24 RX ORDER — ORPHENADRINE CITRATE 100 MG/1
100 TABLET, EXTENDED RELEASE ORAL 2 TIMES DAILY
Qty: 60 TABLET | Refills: 1 | Status: SHIPPED | OUTPATIENT
Start: 2020-02-24 | End: 2020-07-09

## 2020-02-24 NOTE — PROGRESS NOTES
Subjective   Flaquita Cr is a 59 y.o. female.      History of Present Illness   The patient is here today to F/U on lab work. She is feeling ok.   Chronic back pain- has used orphenadrine PRN from Dr. Drake. Is the only thing that works with out knocking her out. Would like refill on this.   Can not take flexeril  She is having some head congestion- took sudafed     AR- taking allergy drops, due to see back soon.   Anxiety and depression- can not take the antidepressant, messed with her stomach, burning/indigestion  Also with some nausea, lower abd pain. Diarrhea (for a few days, now gone)      The following portions of the patient's history were reviewed and updated as appropriate: allergies, current medications, past family history, past medical history, past social history, past surgical history and problem list.    Review of Systems   Constitutional: Negative for chills and fever.   HENT: Positive for congestion.    Respiratory: Negative.    Cardiovascular: Negative.    Gastrointestinal: Positive for abdominal pain and nausea. Negative for blood in stool, diarrhea and vomiting.   Musculoskeletal: Positive for arthralgias.   Psychiatric/Behavioral: Negative for dysphoric mood and suicidal ideas. The patient is not nervous/anxious.        Objective   Physical Exam   Constitutional: She appears well-developed and well-nourished.   Neck: Normal range of motion. Neck supple. No thyromegaly present.   Cardiovascular: Normal rate, regular rhythm, normal heart sounds and intact distal pulses.   Pulmonary/Chest: Effort normal and breath sounds normal.   Abdominal: Soft. Normal appearance and normal aorta. There is no hepatosplenomegaly. There is no tenderness.   Lymphadenopathy:     She has no cervical adenopathy.   Skin: Skin is warm and dry.   Psychiatric: She has a normal mood and affect. Her behavior is normal. Judgment and thought content normal.       Vitals:    02/20/20 1413   BP: 144/70   Pulse: 93    Temp: 97.8 °F (36.6 °C)   SpO2: 96%     Body mass index is 33.11 kg/m².      Assessment/Plan   Flaquita was seen today for prediabetes, hyperlipidemia and insomnia.    Diagnoses and all orders for this visit:    DDD (degenerative disc disease), lumbar  -     orphenadrine (NORFLEX) 100 MG 12 hr tablet; Take 1 tablet by mouth 2 (Two) Times a Day.  -     Ambulatory Referral to Pain Management    Chronic bilateral low back pain with left-sided sciatica  -     Ambulatory Referral to Pain Management    Lower abdominal pain    Prediabetes    Head congestion    Other orders  -     omeprazole (priLOSEC) 40 MG capsule; Take 1 capsule by mouth 2 (Two) Times a Day.               1. Prediabetes- needs labs drawn, will call results to pt.   2. Abd pain- increase omeprazole and see Dr. Prasanna martell. ? ulcer  3. Chronic back pain- will refill, use with caution, no ETOH or operating heavy machinery. Refer to pain management  4. Head congestion- stop sudafed, f/u with allergist

## 2020-03-02 ENCOUNTER — TELEPHONE (OUTPATIENT)
Dept: INTERNAL MEDICINE | Facility: CLINIC | Age: 60
End: 2020-03-02

## 2020-03-02 NOTE — TELEPHONE ENCOUNTER
"----- Message from ISAAK Bruner sent at 3/2/2020 10:05 AM EST -----  Please record this in chart  ----- Message -----  From: Cody Fragoso  Sent: 2/28/2020   9:31 AM EST  To: ISAAK Bruner    Patient says she will have lab work done in August when she comes back to see you. She says she wants to \"work on her numbers\" first.    I expressed why getting labs drawn is important. She still chooses to wait. Just FYI  ----- Message -----  From: Cadence Archuleta APRN  Sent: 2/24/2020   5:09 PM EST  To: Cody Fragoso    Saw her last week when computer was down, she was due for lab work but did not have it drawn. Please call and ask pt to do that and I will call her the results.       "

## 2020-03-05 RX ORDER — CELECOXIB 200 MG/1
CAPSULE ORAL
Qty: 60 CAPSULE | Refills: 0 | Status: SHIPPED | OUTPATIENT
Start: 2020-03-05 | End: 2020-04-09

## 2020-03-24 RX ORDER — FLUNISOLIDE 0.25 MG/ML
SOLUTION NASAL
Qty: 25 ML | Refills: 2 | Status: SHIPPED | OUTPATIENT
Start: 2020-03-24 | End: 2020-08-27 | Stop reason: SDUPTHER

## 2020-04-09 RX ORDER — CELECOXIB 200 MG/1
CAPSULE ORAL
Qty: 180 CAPSULE | Refills: 1 | Status: SHIPPED | OUTPATIENT
Start: 2020-04-09 | End: 2020-04-10

## 2020-04-10 RX ORDER — CELECOXIB 200 MG/1
CAPSULE ORAL
Qty: 180 CAPSULE | Refills: 1 | Status: SHIPPED | OUTPATIENT
Start: 2020-04-10 | End: 2020-04-17 | Stop reason: SDUPTHER

## 2020-04-14 ENCOUNTER — TELEPHONE (OUTPATIENT)
Dept: INTERNAL MEDICINE | Facility: CLINIC | Age: 60
End: 2020-04-14

## 2020-04-14 RX ORDER — MONTELUKAST SODIUM 10 MG/1
10 TABLET ORAL NIGHTLY
Qty: 90 TABLET | Refills: 0 | Status: SHIPPED | OUTPATIENT
Start: 2020-04-14 | End: 2020-07-06

## 2020-04-14 RX ORDER — MONTELUKAST SODIUM 10 MG/1
10 TABLET ORAL NIGHTLY
Qty: 90 TABLET | Refills: 0 | Status: CANCELLED | OUTPATIENT
Start: 2020-04-14

## 2020-04-14 NOTE — TELEPHONE ENCOUNTER
Pt called and requested refill for montelukast (SINGULAIR) 10 MG tablet be sent to [facility]/pharmacy #[id number] - [place], [place] - [address] - [phone]  - [phone] FX     Pt call back   [phone]

## 2020-04-17 ENCOUNTER — TELEPHONE (OUTPATIENT)
Dept: INTERNAL MEDICINE | Facility: CLINIC | Age: 60
End: 2020-04-17

## 2020-04-17 RX ORDER — CELECOXIB 200 MG/1
200 CAPSULE ORAL 2 TIMES DAILY
Qty: 180 CAPSULE | Refills: 1 | Status: SHIPPED | OUTPATIENT
Start: 2020-04-17 | End: 2020-10-19

## 2020-04-17 RX ORDER — CETIRIZINE HYDROCHLORIDE 10 MG/1
10 TABLET ORAL DAILY
Qty: 180 TABLET | Refills: 1 | Status: SHIPPED | OUTPATIENT
Start: 2020-04-17

## 2020-04-17 NOTE — TELEPHONE ENCOUNTER
PATIENT STATES THAT SHE CALLED TO GET A REFILL ON celecoxib (CeleBREX) 200 MG capsule OVER A WEEK AGO AND SHE JUST WANTED TO CHECK ON THE STATUS OF HER REFILL REQUEST   PHARMACY ON FILE CONFIRMED.    PLEASE ADVISE

## 2020-04-27 ENCOUNTER — TELEPHONE (OUTPATIENT)
Dept: INTERNAL MEDICINE | Facility: CLINIC | Age: 60
End: 2020-04-27

## 2020-04-27 RX ORDER — GUAIFENESIN 600 MG/1
1200 TABLET, EXTENDED RELEASE ORAL 2 TIMES DAILY
Qty: 60 TABLET | Refills: 5 | Status: SHIPPED | OUTPATIENT
Start: 2020-04-27

## 2020-04-27 RX ORDER — CHOLECALCIFEROL (VITAMIN D3) 50 MCG
2000 TABLET ORAL DAILY
Qty: 30 TABLET | Refills: 5 | Status: SHIPPED | OUTPATIENT
Start: 2020-04-27

## 2020-04-27 NOTE — TELEPHONE ENCOUNTER
PATIENT CALLED TO REQUEST REFILLS ON A FEW MEDICATIONS AND SHE WOULD LIKE FOR THOSE TO GO TO THE [facility] PHARM THAT IS IN HER CHART. THE PATIENT CAN BE REACHED [phone].    1.GUAIFENESIN  1200 MG TWICE DAILY    2. PROBIOTIC PRODUCT    3.VITAMIN D ( FOR WOMAN OVER 50 YRS OLD IF POSSIBLE)

## 2020-05-07 ENCOUNTER — TELEPHONE (OUTPATIENT)
Dept: INTERNAL MEDICINE | Facility: CLINIC | Age: 60
End: 2020-05-07

## 2020-05-07 ENCOUNTER — TELEMEDICINE (OUTPATIENT)
Dept: INTERNAL MEDICINE | Facility: CLINIC | Age: 60
End: 2020-05-07

## 2020-05-07 VITALS — DIASTOLIC BLOOD PRESSURE: 88 MMHG | SYSTOLIC BLOOD PRESSURE: 147 MMHG | TEMPERATURE: 97.6 F | HEART RATE: 93 BPM

## 2020-05-07 DIAGNOSIS — F17.200 CURRENT EVERY DAY SMOKER: ICD-10-CM

## 2020-05-07 DIAGNOSIS — N30.00 ACUTE CYSTITIS WITHOUT HEMATURIA: Primary | ICD-10-CM

## 2020-05-07 DIAGNOSIS — R03.0 ELEVATED BLOOD PRESSURE READING: ICD-10-CM

## 2020-05-07 PROCEDURE — 99213 OFFICE O/P EST LOW 20 MIN: CPT | Performed by: NURSE PRACTITIONER

## 2020-05-07 RX ORDER — VARENICLINE TARTRATE 1 MG/1
1 TABLET, FILM COATED ORAL 2 TIMES DAILY
Qty: 60 TABLET | Refills: 5 | Status: SHIPPED | OUTPATIENT
Start: 2020-05-07 | End: 2020-06-27 | Stop reason: HOSPADM

## 2020-05-07 RX ORDER — NITROFURANTOIN 25; 75 MG/1; MG/1
100 CAPSULE ORAL 2 TIMES DAILY
Qty: 10 CAPSULE | Refills: 0 | Status: SHIPPED | OUTPATIENT
Start: 2020-05-07 | End: 2020-05-13

## 2020-05-07 RX ORDER — AZELASTINE HCL 205.5 UG/1
1 SPRAY NASAL DAILY
COMMUNITY
Start: 2020-04-17

## 2020-05-07 NOTE — PATIENT INSTRUCTIONS
"1. UTI- will treat empirically, treat with macrobid X5 days, hydrate, probiotic daily  DASH Eating Plan  DASH stands for \"Dietary Approaches to Stop Hypertension.\" The DASH eating plan is a healthy eating plan that has been shown to reduce high blood pressure (hypertension). It may also reduce your risk for type 2 diabetes, heart disease, and stroke. The DASH eating plan may also help with weight loss.  What are tips for following this plan?    General guidelines  · Avoid eating more than 2,300 mg (milligrams) of salt (sodium) a day. If you have hypertension, you may need to reduce your sodium intake to 1,500 mg a day.  · Limit alcohol intake to no more than 1 drink a day for nonpregnant women and 2 drinks a day for men. One drink equals 12 oz of beer, 5 oz of wine, or 1½ oz of hard liquor.  · Work with your health care provider to maintain a healthy body weight or to lose weight. Ask what an ideal weight is for you.  · Get at least 30 minutes of exercise that causes your heart to beat faster (aerobic exercise) most days of the week. Activities may include walking, swimming, or biking.  · Work with your health care provider or diet and nutrition specialist (dietitian) to adjust your eating plan to your individual calorie needs.  Reading food labels    · Check food labels for the amount of sodium per serving. Choose foods with less than 5 percent of the Daily Value of sodium. Generally, foods with less than 300 mg of sodium per serving fit into this eating plan.  · To find whole grains, look for the word \"whole\" as the first word in the ingredient list.  Shopping  · Buy products labeled as \"low-sodium\" or \"no salt added.\"  · Buy fresh foods. Avoid canned foods and premade or frozen meals.  Cooking  · Avoid adding salt when cooking. Use salt-free seasonings or herbs instead of table salt or sea salt. Check with your health care provider or pharmacist before using salt substitutes.  · Do not borja foods. Cook foods using " healthy methods such as baking, boiling, grilling, and broiling instead.  · Cook with heart-healthy oils, such as olive, canola, soybean, or sunflower oil.  Meal planning  · Eat a balanced diet that includes:  ? 5 or more servings of fruits and vegetables each day. At each meal, try to fill half of your plate with fruits and vegetables.  ? Up to 6-8 servings of whole grains each day.  ? Less than 6 oz of lean meat, poultry, or fish each day. A 3-oz serving of meat is about the same size as a deck of cards. One egg equals 1 oz.  ? 2 servings of low-fat dairy each day.  ? A serving of nuts, seeds, or beans 5 times each week.  ? Heart-healthy fats. Healthy fats called Omega-3 fatty acids are found in foods such as flaxseeds and coldwater fish, like sardines, salmon, and mackerel.  · Limit how much you eat of the following:  ? Canned or prepackaged foods.  ? Food that is high in trans fat, such as fried foods.  ? Food that is high in saturated fat, such as fatty meat.  ? Sweets, desserts, sugary drinks, and other foods with added sugar.  ? Full-fat dairy products.  · Do not salt foods before eating.  · Try to eat at least 2 vegetarian meals each week.  · Eat more home-cooked food and less restaurant, buffet, and fast food.  · When eating at a restaurant, ask that your food be prepared with less salt or no salt, if possible.  What foods are recommended?  The items listed may not be a complete list. Talk with your dietitian about what dietary choices are best for you.  Grains  Whole-grain or whole-wheat bread. Whole-grain or whole-wheat pasta. Brown rice. Oatmeal. Quinoa. Bulgur. Whole-grain and low-sodium cereals. Elena bread. Low-fat, low-sodium crackers. Whole-wheat flour tortillas.  Vegetables  Fresh or frozen vegetables (raw, steamed, roasted, or grilled). Low-sodium or reduced-sodium tomato and vegetable juice. Low-sodium or reduced-sodium tomato sauce and tomato paste. Low-sodium or reduced-sodium canned  vegetables.  Fruits  All fresh, dried, or frozen fruit. Canned fruit in natural juice (without added sugar).  Meat and other protein foods  Skinless chicken or turkey. Ground chicken or turkey. Pork with fat trimmed off. Fish and seafood. Egg whites. Dried beans, peas, or lentils. Unsalted nuts, nut butters, and seeds. Unsalted canned beans. Lean cuts of beef with fat trimmed off. Low-sodium, lean deli meat.  Dairy  Low-fat (1%) or fat-free (skim) milk. Fat-free, low-fat, or reduced-fat cheeses. Nonfat, low-sodium ricotta or cottage cheese. Low-fat or nonfat yogurt. Low-fat, low-sodium cheese.  Fats and oils  Soft margarine without trans fats. Vegetable oil. Low-fat, reduced-fat, or light mayonnaise and salad dressings (reduced-sodium). Canola, safflower, olive, soybean, and sunflower oils. Avocado.  Seasoning and other foods  Herbs. Spices. Seasoning mixes without salt. Unsalted popcorn and pretzels. Fat-free sweets.  What foods are not recommended?  The items listed may not be a complete list. Talk with your dietitian about what dietary choices are best for you.  Grains  Baked goods made with fat, such as croissants, muffins, or some breads. Dry pasta or rice meal packs.  Vegetables  Creamed or fried vegetables. Vegetables in a cheese sauce. Regular canned vegetables (not low-sodium or reduced-sodium). Regular canned tomato sauce and paste (not low-sodium or reduced-sodium). Regular tomato and vegetable juice (not low-sodium or reduced-sodium). Pickles. Olives.  Fruits  Canned fruit in a light or heavy syrup. Fried fruit. Fruit in cream or butter sauce.  Meat and other protein foods  Fatty cuts of meat. Ribs. Fried meat. Perez. Sausage. Bologna and other processed lunch meats. Salami. Fatback. Hotdogs. Bratwurst. Salted nuts and seeds. Canned beans with added salt. Canned or smoked fish. Whole eggs or egg yolks. Chicken or turkey with skin.  Dairy  Whole or 2% milk, cream, and half-and-half. Whole or full-fat  cream cheese. Whole-fat or sweetened yogurt. Full-fat cheese. Nondairy creamers. Whipped toppings. Processed cheese and cheese spreads.  Fats and oils  Butter. Stick margarine. Lard. Shortening. Ghee. Perez fat. Tropical oils, such as coconut, palm kernel, or palm oil.  Seasoning and other foods  Salted popcorn and pretzels. Onion salt, garlic salt, seasoned salt, table salt, and sea salt. Worcestershire sauce. Tartar sauce. Barbecue sauce. Teriyaki sauce. Soy sauce, including reduced-sodium. Steak sauce. Canned and packaged gravies. Fish sauce. Oyster sauce. Cocktail sauce. Horseradish that you find on the shelf. Ketchup. Mustard. Meat flavorings and tenderizers. Bouillon cubes. Hot sauce and Tabasco sauce. Premade or packaged marinades. Premade or packaged taco seasonings. Relishes. Regular salad dressings.  Where to find more information:  · National Heart, Lung, and Blood [place]: www.nhlbi.nih.gov  · American Heart Association: www.heart.org  Summary  · The DASH eating plan is a healthy eating plan that has been shown to reduce high blood pressure (hypertension). It may also reduce your risk for type 2 diabetes, heart disease, and stroke.  · With the DASH eating plan, you should limit salt (sodium) intake to 2,300 mg a day. If you have hypertension, you may need to reduce your sodium intake to 1,500 mg a day.  · When on the DASH eating plan, aim to eat more fresh fruits and vegetables, whole grains, lean proteins, low-fat dairy, and heart-healthy fats.  · Work with your health care provider or diet and nutrition specialist (dietitian) to adjust your eating plan to your individual calorie needs.  This information is not intended to replace advice given to you by your health care provider. Make sure you discuss any questions you have with your health care provider.  Document Released: 12/06/2012 Document Revised: 12/11/2017 Document Reviewed: 12/11/2017  [employer] Patient Education © 2020 [employer]

## 2020-05-07 NOTE — TELEPHONE ENCOUNTER
Patient states that she is waking up all through the night to use the restroom and has stomach discomfort

## 2020-05-07 NOTE — PROGRESS NOTES
"Subjective   Flaquita Cr is a 60 y.o. female.     History of Present Illness    The patient is here today with c/o urinary frequency and nocturia. She may get up at night once but that has been more frequent lately. Also with urinary urgency, has this as some baseline. No burning.     Stopped sudafed due to elevated blood pressures.   Checks BP at home a few times a month. Home cuff has not been checked for accuracy.   Smoking less than 1/2 pack per day. Ready to quit.   Feeling well otherwise, social distancing.   The following portions of the patient's history were reviewed and updated as appropriate: allergies, current medications, past family history, past medical history, past social history, past surgical history and problem list.    Review of Systems   Constitutional: Negative.    Respiratory: Negative.    Cardiovascular: Negative.    Genitourinary: Positive for flank pain (\" I have that all the time\"), frequency and urgency. Negative for dysuria, hematuria and vaginal discharge.   Psychiatric/Behavioral: Negative.        Objective   Physical Exam   Psychiatric: She has a normal mood and affect. Her speech is normal and behavior is normal. Judgment and thought content normal. Cognition and memory are normal.       Vitals:    05/07/20 1211   BP: 147/88   Pulse: 93   Temp: 97.6 °F (36.4 °C)     There is no height or weight on file to calculate BMI.      Assessment/Plan   Flaquita was seen today for urinary frequency.    Diagnoses and all orders for this visit:    Acute cystitis without hematuria  -     nitrofurantoin, macrocrystal-monohydrate, (Macrobid) 100 MG capsule; Take 1 capsule by mouth 2 (Two) Times a Day.    Current every day smoker  -     varenicline (Chantix Starting Month Thierno) 0.5 MG X 11 & 1 MG X 42 tablet; Take 0.5 mg one daily on days 1-3 and and 0.5 mg twice daily on days 4-7.Then 1 mg twice daily for a total of 12 weeks.  -     varenicline (Chantix Continuing Month Thierno) 1 MG tablet; Take 1 " tablet by mouth 2 (Two) Times a Day.    Elevated blood pressure reading        Pt unable to download zoom, completed via telephone. Pt consents.  15 minutes spent with patient on visit today.          1. UTI- will treat empirically, treat with macrobid X5 days, hydrate, probiotic daily  Continue to monitor BP, use DASH diet, quit smoking.   2. Current every day smoker- send chantix to pharmacy

## 2020-05-13 ENCOUNTER — TELEPHONE (OUTPATIENT)
Dept: INTERNAL MEDICINE | Facility: CLINIC | Age: 60
End: 2020-05-13

## 2020-05-13 RX ORDER — SULFAMETHOXAZOLE AND TRIMETHOPRIM 800; 160 MG/1; MG/1
1 TABLET ORAL 2 TIMES DAILY
Qty: 10 TABLET | Refills: 0 | Status: SHIPPED | OUTPATIENT
Start: 2020-05-13 | End: 2020-06-27 | Stop reason: HOSPADM

## 2020-05-13 NOTE — TELEPHONE ENCOUNTER
Feels she is having SEs to macrobid would like to stop. Her UTI symptoms are almost gone. Still some cramps this am, but may contribute this to eating food that isnt good for her.   Fever has not gotten higher than 100.6, some chills. Today her temp was 100.1. Did not take the macrobid today. Having some myalgias.     She is breathing with out difficulty. Does have some head congestion.     Will try bactrim DS.     Discussed she likely has another infection on top of the UTI. She does not feel any other symptoms but will monitor and notify if anything changes. Will list macrobid as allergy just in case.

## 2020-05-13 NOTE — TELEPHONE ENCOUNTER
PT CALLED SAYING THAT SHE BELIEVES THAT THE nitrofurantoin, macrocrystal-monohydrate, (Macrobid) 100 MG capsule IS MAKING HER SICK. SINCE SHE HAS STARTED TO TAKE THE MED SHE HAS STARTED TO HAVE A LOW GRADE FEVER, CHILLS, WEAKNESS, HEADACHES AND BODY ACHES AND PAINS. SHE SAID THAT THE LONGER IS  ON IT THE WORSE IT HAS GOTTEN. SHE SAID THAT IT HAS HELPED WITH THE UTI. SHE WOULD LIKE TO KNOW IF SHE SHOULD CONTINUE TO TAKE THIS MED OR STOP IT AND GET SOMETHING ELSE? PT HAS NOT TAKEN TODAY.    CALL BACK # [phone]

## 2020-05-20 ENCOUNTER — TELEMEDICINE (OUTPATIENT)
Dept: INTERNAL MEDICINE | Facility: CLINIC | Age: 60
End: 2020-05-20

## 2020-05-20 VITALS — SYSTOLIC BLOOD PRESSURE: 119 MMHG | TEMPERATURE: 98.3 F | HEART RATE: 91 BPM | DIASTOLIC BLOOD PRESSURE: 77 MMHG

## 2020-05-20 DIAGNOSIS — N30.00 ACUTE CYSTITIS WITHOUT HEMATURIA: Primary | ICD-10-CM

## 2020-05-20 PROCEDURE — 99213 OFFICE O/P EST LOW 20 MIN: CPT | Performed by: NURSE PRACTITIONER

## 2020-05-20 NOTE — PROGRESS NOTES
"Subjective   Flaquita Cr is a 60 y.o. female.     History of Present Illness    The patient is here today with c/o fever and chills. She waited 2 days before she started the 2nd antibiotic for UTI. She then had 3 days of fatigue. On the 4th day with chills. Temp max 100.9. She took tylenol and celebrex with relief. No temp this am. \"Im feeling better today. I just feel extremely tired.\" Slight headache and mid back pain. This can usually happen with congestion and is usually relieved by mucinex, she started that this morning.   She is taking all of her allergy meds.   She stopped her enablex because she has had some abd cramping, feeling better today with out it.     Current every day smoker, has not started chantix yet.   The following portions of the patient's history were reviewed and updated as appropriate: allergies, current medications, past family history, past medical history, past social history, past surgical history and problem list.    Review of Systems   Constitutional: Positive for chills, fatigue and fever.   HENT: Positive for congestion (head), rhinorrhea (clear, occ yellow) and sneezing.    Respiratory: Positive for cough (\"a little bit\"). Negative for chest tightness, shortness of breath and wheezing.    Cardiovascular: Negative.    Genitourinary: Negative for dysuria, flank pain, frequency and urgency.   Musculoskeletal: Positive for back pain.   Neurological: Positive for headache.       Objective   Physical Exam   Constitutional: She appears well-developed and well-nourished. She does not appear ill.   Pulmonary/Chest: Effort normal.       Vitals:    05/20/20 0857   BP: 119/77   Pulse: 91   Temp: 98.3 °F (36.8 °C)     There is no height or weight on file to calculate BMI.      Assessment/Plan   Flaquita was seen today for fever, chills, fatigue and headache.    Diagnoses and all orders for this visit:    Acute cystitis without hematuria        Visit completed via video on nicole, pt " consented.         1. UTI- appears resolved  2. Fever and congestion- possibly from under treated UTI, now feeling better, treat AR with meds on med list as discussed  Any SOA, chest tightness, or fever go to [facility] for evaluation.

## 2020-05-20 NOTE — PATIENT INSTRUCTIONS
1. UTI- appears resolved  2. Fever and congestion- possibly from under treated UTI, now feeling better, treat AR with meds on med list as discussed  Any SOA, chest tightness, or fever go to [facility] for evaluation.

## 2020-06-19 RX ORDER — OMEPRAZOLE 40 MG/1
CAPSULE, DELAYED RELEASE ORAL
Qty: 180 CAPSULE | Refills: 0 | Status: SHIPPED | OUTPATIENT
Start: 2020-06-19 | End: 2020-06-30

## 2020-06-24 ENCOUNTER — APPOINTMENT (OUTPATIENT)
Dept: GENERAL RADIOLOGY | Facility: HOSPITAL | Age: 60
End: 2020-06-24

## 2020-06-24 ENCOUNTER — HOSPITAL ENCOUNTER (OUTPATIENT)
Facility: HOSPITAL | Age: 60
Discharge: HOME | End: 2020-06-27
Attending: EMERGENCY MEDICINE | Admitting: INTERNAL MEDICINE

## 2020-06-24 DIAGNOSIS — R94.31 ABNORMAL EKG: ICD-10-CM

## 2020-06-24 DIAGNOSIS — R07.9 CHEST PAIN, UNSPECIFIED TYPE: Primary | ICD-10-CM

## 2020-06-24 LAB
ALBUMIN SERPL-MCNC: 4.2 G/DL (ref 3.5–5.2)
ALBUMIN/GLOB SERPL: 1.2 G/DL
ALP SERPL-CCNC: 101 U/L (ref 39–117)
ALT SERPL W P-5'-P-CCNC: 23 U/L (ref 1–33)
ANION GAP SERPL CALCULATED.3IONS-SCNC: 10.7 MMOL/L (ref 5–15)
AST SERPL-CCNC: 25 U/L (ref 1–32)
BILIRUB SERPL-MCNC: 0.2 MG/DL (ref 0.2–1.2)
BUN BLD-MCNC: 12 MG/DL (ref 8–23)
BUN/CREAT SERPL: 13.3 (ref 7–25)
CALCIUM SPEC-SCNC: 9.6 MG/DL (ref 8.6–10.5)
CHLORIDE SERPL-SCNC: 103 MMOL/L (ref 98–107)
CO2 SERPL-SCNC: 26.3 MMOL/L (ref 22–29)
CREAT BLD-MCNC: 0.9 MG/DL (ref 0.57–1)
DEPRECATED RDW RBC AUTO: 47.1 FL (ref 37–54)
EOSINOPHIL # BLD MANUAL: 0.41 10*3/MM3 (ref 0–0.4)
EOSINOPHIL NFR BLD MANUAL: 4.3 % (ref 0.3–6.2)
ERYTHROCYTE [DISTWIDTH] IN BLOOD BY AUTOMATED COUNT: 14.5 % (ref 12.3–15.4)
GFR SERPL CREATININE-BSD FRML MDRD: 64 ML/MIN/1.73
GLOBULIN UR ELPH-MCNC: 3.6 GM/DL
GLUCOSE BLD-MCNC: 114 MG/DL (ref 65–99)
HCT VFR BLD AUTO: 40 % (ref 34–46.6)
HGB BLD-MCNC: 13.5 G/DL (ref 12–15.9)
HOLD SPECIMEN: NORMAL
HOLD SPECIMEN: NORMAL
LYMPHOCYTES # BLD MANUAL: 2.79 10*3/MM3 (ref 0.7–3.1)
LYMPHOCYTES NFR BLD MANUAL: 29 % (ref 19.6–45.3)
LYMPHOCYTES NFR BLD MANUAL: 4.3 % (ref 5–12)
MCH RBC QN AUTO: 30.1 PG (ref 26.6–33)
MCHC RBC AUTO-ENTMCNC: 33.8 G/DL (ref 31.5–35.7)
MCV RBC AUTO: 89.1 FL (ref 79–97)
MONOCYTES # BLD AUTO: 0.41 10*3/MM3 (ref 0.1–0.9)
NEUTROPHILS # BLD AUTO: 6 10*3/MM3 (ref 1.7–7)
NEUTROPHILS NFR BLD MANUAL: 62.4 % (ref 42.7–76)
PLAT MORPH BLD: NORMAL
PLATELET # BLD AUTO: 333 10*3/MM3 (ref 140–450)
PMV BLD AUTO: 9.4 FL (ref 6–12)
POLYCHROMASIA BLD QL SMEAR: ABNORMAL
POTASSIUM BLD-SCNC: 3.7 MMOL/L (ref 3.5–5.2)
PROT SERPL-MCNC: 7.8 G/DL (ref 6–8.5)
RBC # BLD AUTO: 4.49 10*6/MM3 (ref 3.77–5.28)
SMUDGE CELLS BLD QL SMEAR: ABNORMAL
SODIUM BLD-SCNC: 140 MMOL/L (ref 136–145)
TROPONIN T SERPL-MCNC: <0.01 NG/ML (ref 0–0.03)
TROPONIN T SERPL-MCNC: <0.01 NG/ML (ref 0–0.03)
WBC NRBC COR # BLD: 9.61 10*3/MM3 (ref 3.4–10.8)
WHOLE BLOOD HOLD SPECIMEN: NORMAL
WHOLE BLOOD HOLD SPECIMEN: NORMAL

## 2020-06-24 PROCEDURE — G0378 HOSPITAL OBSERVATION PER HR: HCPCS

## 2020-06-24 PROCEDURE — 93010 ELECTROCARDIOGRAM REPORT: CPT | Performed by: INTERNAL MEDICINE

## 2020-06-24 PROCEDURE — 85025 COMPLETE CBC W/AUTO DIFF WBC: CPT | Performed by: EMERGENCY MEDICINE

## 2020-06-24 PROCEDURE — 80053 COMPREHEN METABOLIC PANEL: CPT | Performed by: EMERGENCY MEDICINE

## 2020-06-24 PROCEDURE — 85007 BL SMEAR W/DIFF WBC COUNT: CPT | Performed by: EMERGENCY MEDICINE

## 2020-06-24 PROCEDURE — 93005 ELECTROCARDIOGRAM TRACING: CPT | Performed by: EMERGENCY MEDICINE

## 2020-06-24 PROCEDURE — 99284 EMERGENCY DEPT VISIT MOD MDM: CPT

## 2020-06-24 PROCEDURE — 96375 TX/PRO/DX INJ NEW DRUG ADDON: CPT

## 2020-06-24 PROCEDURE — 84484 ASSAY OF TROPONIN QUANT: CPT | Performed by: EMERGENCY MEDICINE

## 2020-06-24 PROCEDURE — 71045 X-RAY EXAM CHEST 1 VIEW: CPT

## 2020-06-24 RX ORDER — NITROGLYCERIN 0.4 MG/1
0.4 TABLET SUBLINGUAL
Status: DISCONTINUED | OUTPATIENT
Start: 2020-06-24 | End: 2020-06-26

## 2020-06-24 RX ORDER — SODIUM CHLORIDE 0.9 % (FLUSH) 0.9 %
10 SYRINGE (ML) INJECTION AS NEEDED
Status: DISCONTINUED | OUTPATIENT
Start: 2020-06-24 | End: 2020-06-27 | Stop reason: HOSPADM

## 2020-06-24 RX ORDER — FAMOTIDINE 10 MG/ML
20 INJECTION, SOLUTION INTRAVENOUS ONCE
Status: COMPLETED | OUTPATIENT
Start: 2020-06-24 | End: 2020-06-24

## 2020-06-24 RX ADMIN — FAMOTIDINE 20 MG: 10 INJECTION INTRAVENOUS at 21:02

## 2020-06-24 RX ADMIN — NITROGLYCERIN 0.4 MG: 0.4 TABLET SUBLINGUAL at 21:02

## 2020-06-24 RX ADMIN — NITROGLYCERIN 1 INCH: 20 OINTMENT TOPICAL at 23:05

## 2020-06-25 ENCOUNTER — APPOINTMENT (OUTPATIENT)
Dept: NUCLEAR MEDICINE | Facility: HOSPITAL | Age: 60
End: 2020-06-25

## 2020-06-25 LAB
ANION GAP SERPL CALCULATED.3IONS-SCNC: 9.9 MMOL/L (ref 5–15)
BH CV STRESS COMMENTS STAGE 1: NORMAL
BH CV STRESS DOSE REGADENOSON STAGE 1: 0.4
BH CV STRESS DURATION MIN STAGE 1: 0
BH CV STRESS DURATION SEC STAGE 1: 10
BH CV STRESS PROTOCOL 1: NORMAL
BH CV STRESS RECOVERY BP: NORMAL MMHG
BH CV STRESS RECOVERY HR: 83 BPM
BH CV STRESS STAGE 1: 1
BUN BLD-MCNC: 17 MG/DL (ref 8–23)
BUN/CREAT SERPL: 17.5 (ref 7–25)
CALCIUM SPEC-SCNC: 9.1 MG/DL (ref 8.6–10.5)
CHLORIDE SERPL-SCNC: 105 MMOL/L (ref 98–107)
CO2 SERPL-SCNC: 28.1 MMOL/L (ref 22–29)
CREAT BLD-MCNC: 0.97 MG/DL (ref 0.57–1)
GFR SERPL CREATININE-BSD FRML MDRD: 59 ML/MIN/1.73
GLUCOSE BLD-MCNC: 105 MG/DL (ref 65–99)
LV EF NUC BP: 63 %
MAXIMAL PREDICTED HEART RATE: 160 BPM
PERCENT MAX PREDICTED HR: 71.25 %
POTASSIUM BLD-SCNC: 4.4 MMOL/L (ref 3.5–5.2)
SODIUM BLD-SCNC: 143 MMOL/L (ref 136–145)
STRESS BASELINE BP: NORMAL MMHG
STRESS BASELINE HR: 62 BPM
STRESS PERCENT HR: 84 %
STRESS POST PEAK BP: NORMAL MMHG
STRESS POST PEAK HR: 114 BPM
STRESS TARGET HR: 136 BPM
TROPONIN T SERPL-MCNC: <0.01 NG/ML (ref 0–0.03)

## 2020-06-25 PROCEDURE — G0378 HOSPITAL OBSERVATION PER HR: HCPCS

## 2020-06-25 PROCEDURE — 25010000002 REGADENOSON 0.4 MG/5ML SOLUTION: Performed by: INTERNAL MEDICINE

## 2020-06-25 PROCEDURE — 78452 HT MUSCLE IMAGE SPECT MULT: CPT

## 2020-06-25 PROCEDURE — 93017 CV STRESS TEST TRACING ONLY: CPT

## 2020-06-25 PROCEDURE — 80048 BASIC METABOLIC PNL TOTAL CA: CPT | Performed by: INTERNAL MEDICINE

## 2020-06-25 PROCEDURE — 78452 HT MUSCLE IMAGE SPECT MULT: CPT | Performed by: INTERNAL MEDICINE

## 2020-06-25 PROCEDURE — 84484 ASSAY OF TROPONIN QUANT: CPT | Performed by: INTERNAL MEDICINE

## 2020-06-25 PROCEDURE — 0 TECHNETIUM SESTAMIBI: Performed by: INTERNAL MEDICINE

## 2020-06-25 PROCEDURE — 99219 PR INITIAL OBSERVATION CARE/DAY 50 MINUTES: CPT | Performed by: INTERNAL MEDICINE

## 2020-06-25 PROCEDURE — 93016 CV STRESS TEST SUPVJ ONLY: CPT | Performed by: INTERNAL MEDICINE

## 2020-06-25 PROCEDURE — 93018 CV STRESS TEST I&R ONLY: CPT | Performed by: INTERNAL MEDICINE

## 2020-06-25 PROCEDURE — A9500 TC99M SESTAMIBI: HCPCS | Performed by: INTERNAL MEDICINE

## 2020-06-25 RX ORDER — ACETAMINOPHEN 325 MG/1
650 TABLET ORAL EVERY 6 HOURS PRN
Status: DISCONTINUED | OUTPATIENT
Start: 2020-06-25 | End: 2020-06-27 | Stop reason: HOSPADM

## 2020-06-25 RX ORDER — PANTOPRAZOLE SODIUM 40 MG/1
40 TABLET, DELAYED RELEASE ORAL EVERY MORNING
Status: DISCONTINUED | OUTPATIENT
Start: 2020-06-25 | End: 2020-06-27 | Stop reason: HOSPADM

## 2020-06-25 RX ORDER — OXYBUTYNIN CHLORIDE 10 MG/1
10 TABLET, EXTENDED RELEASE ORAL DAILY
Status: DISCONTINUED | OUTPATIENT
Start: 2020-06-25 | End: 2020-06-27 | Stop reason: HOSPADM

## 2020-06-25 RX ORDER — FLUTICASONE PROPIONATE 50 MCG
2 SPRAY, SUSPENSION (ML) NASAL DAILY
Status: DISCONTINUED | OUTPATIENT
Start: 2020-06-25 | End: 2020-06-27 | Stop reason: HOSPADM

## 2020-06-25 RX ORDER — VARENICLINE TARTRATE 0.5 MG/1
1 TABLET, FILM COATED ORAL 2 TIMES DAILY
Status: DISCONTINUED | OUTPATIENT
Start: 2020-06-25 | End: 2020-06-26

## 2020-06-25 RX ORDER — MELATONIN
2000 DAILY
Status: DISCONTINUED | OUTPATIENT
Start: 2020-06-25 | End: 2020-06-27 | Stop reason: HOSPADM

## 2020-06-25 RX ORDER — ALBUTEROL SULFATE 2.5 MG/3ML
2.5 SOLUTION RESPIRATORY (INHALATION) EVERY 6 HOURS PRN
Status: DISCONTINUED | OUTPATIENT
Start: 2020-06-25 | End: 2020-06-27 | Stop reason: HOSPADM

## 2020-06-25 RX ORDER — L.ACID,PARA/B.BIFIDUM/S.THERM 8B CELL
1 CAPSULE ORAL DAILY
Status: DISCONTINUED | OUTPATIENT
Start: 2020-06-25 | End: 2020-06-27 | Stop reason: HOSPADM

## 2020-06-25 RX ORDER — GUAIFENESIN 600 MG/1
1200 TABLET, EXTENDED RELEASE ORAL 2 TIMES DAILY
Status: DISCONTINUED | OUTPATIENT
Start: 2020-06-25 | End: 2020-06-27 | Stop reason: HOSPADM

## 2020-06-25 RX ORDER — ORPHENADRINE CITRATE 100 MG/1
100 TABLET, EXTENDED RELEASE ORAL 2 TIMES DAILY
Status: DISCONTINUED | OUTPATIENT
Start: 2020-06-25 | End: 2020-06-27 | Stop reason: HOSPADM

## 2020-06-25 RX ORDER — CELECOXIB 200 MG/1
200 CAPSULE ORAL 2 TIMES DAILY
Status: DISCONTINUED | OUTPATIENT
Start: 2020-06-25 | End: 2020-06-27 | Stop reason: HOSPADM

## 2020-06-25 RX ORDER — SODIUM CHLORIDE 0.9 % (FLUSH) 0.9 %
10 SYRINGE (ML) INJECTION EVERY 12 HOURS SCHEDULED
Status: DISCONTINUED | OUTPATIENT
Start: 2020-06-25 | End: 2020-06-27 | Stop reason: HOSPADM

## 2020-06-25 RX ORDER — SODIUM CHLORIDE 0.9 % (FLUSH) 0.9 %
10 SYRINGE (ML) INJECTION AS NEEDED
Status: DISCONTINUED | OUTPATIENT
Start: 2020-06-25 | End: 2020-06-27 | Stop reason: HOSPADM

## 2020-06-25 RX ORDER — CETIRIZINE HYDROCHLORIDE 10 MG/1
10 TABLET ORAL DAILY
Status: DISCONTINUED | OUTPATIENT
Start: 2020-06-25 | End: 2020-06-27 | Stop reason: HOSPADM

## 2020-06-25 RX ORDER — MONTELUKAST SODIUM 10 MG/1
10 TABLET ORAL NIGHTLY
Status: DISCONTINUED | OUTPATIENT
Start: 2020-06-25 | End: 2020-06-27 | Stop reason: HOSPADM

## 2020-06-25 RX ADMIN — TECHNETIUM TC 99M SESTAMIBI 1 DOSE: 1 INJECTION INTRAVENOUS at 10:15

## 2020-06-25 RX ADMIN — GUAIFENESIN 1200 MG: 600 TABLET, EXTENDED RELEASE ORAL at 15:27

## 2020-06-25 RX ADMIN — Medication 1 CAPSULE: at 15:30

## 2020-06-25 RX ADMIN — ACETAMINOPHEN 650 MG: 325 TABLET, FILM COATED ORAL at 14:40

## 2020-06-25 RX ADMIN — REGADENOSON 0.4 MG: 0.08 INJECTION, SOLUTION INTRAVENOUS at 11:10

## 2020-06-25 RX ADMIN — OXYBUTYNIN CHLORIDE 10 MG: 10 TABLET, EXTENDED RELEASE ORAL at 15:28

## 2020-06-25 RX ADMIN — VITAMIN D, TAB 1000IU (100/BT) 2000 UNITS: 25 TAB at 15:27

## 2020-06-25 RX ADMIN — CELECOXIB 200 MG: 200 CAPSULE ORAL at 21:35

## 2020-06-25 RX ADMIN — TECHNETIUM TC 99M SESTAMIBI 1 DOSE: 1 INJECTION INTRAVENOUS at 11:10

## 2020-06-25 RX ADMIN — CETIRIZINE HYDROCHLORIDE 10 MG: 10 TABLET, FILM COATED ORAL at 15:28

## 2020-06-25 RX ADMIN — ORPHENADRINE CITRATE 100 MG: 100 TABLET, EXTENDED RELEASE ORAL at 15:28

## 2020-06-26 LAB
ANION GAP SERPL CALCULATED.3IONS-SCNC: 12.5 MMOL/L (ref 5–15)
BUN BLD-MCNC: 17 MG/DL (ref 8–23)
BUN/CREAT SERPL: 18.1 (ref 7–25)
CALCIUM SPEC-SCNC: 9.3 MG/DL (ref 8.6–10.5)
CHLORIDE SERPL-SCNC: 103 MMOL/L (ref 98–107)
CO2 SERPL-SCNC: 26.5 MMOL/L (ref 22–29)
CREAT BLD-MCNC: 0.94 MG/DL (ref 0.57–1)
DEPRECATED RDW RBC AUTO: 47 FL (ref 37–54)
ERYTHROCYTE [DISTWIDTH] IN BLOOD BY AUTOMATED COUNT: 14.4 % (ref 12.3–15.4)
GFR SERPL CREATININE-BSD FRML MDRD: 61 ML/MIN/1.73
GLUCOSE BLD-MCNC: 90 MG/DL (ref 65–99)
HCT VFR BLD AUTO: 38.2 % (ref 34–46.6)
HGB BLD-MCNC: 12.8 G/DL (ref 12–15.9)
MCH RBC QN AUTO: 30 PG (ref 26.6–33)
MCHC RBC AUTO-ENTMCNC: 33.5 G/DL (ref 31.5–35.7)
MCV RBC AUTO: 89.7 FL (ref 79–97)
PLATELET # BLD AUTO: 268 10*3/MM3 (ref 140–450)
PMV BLD AUTO: 9.8 FL (ref 6–12)
POTASSIUM BLD-SCNC: 4.1 MMOL/L (ref 3.5–5.2)
RBC # BLD AUTO: 4.26 10*6/MM3 (ref 3.77–5.28)
SODIUM BLD-SCNC: 142 MMOL/L (ref 136–145)
WBC NRBC COR # BLD: 6.16 10*3/MM3 (ref 3.4–10.8)

## 2020-06-26 PROCEDURE — 80048 BASIC METABOLIC PNL TOTAL CA: CPT | Performed by: INTERNAL MEDICINE

## 2020-06-26 PROCEDURE — 85027 COMPLETE CBC AUTOMATED: CPT | Performed by: INTERNAL MEDICINE

## 2020-06-26 PROCEDURE — G0378 HOSPITAL OBSERVATION PER HR: HCPCS

## 2020-06-26 PROCEDURE — 0 IOPAMIDOL PER 1 ML: Performed by: INTERNAL MEDICINE

## 2020-06-26 PROCEDURE — C1769 GUIDE WIRE: HCPCS | Performed by: INTERNAL MEDICINE

## 2020-06-26 PROCEDURE — 25010000002 MORPHINE SULFATE (PF) 2 MG/ML SOLUTION: Performed by: INTERNAL MEDICINE

## 2020-06-26 PROCEDURE — C1725 CATH, TRANSLUMIN NON-LASER: HCPCS | Performed by: INTERNAL MEDICINE

## 2020-06-26 PROCEDURE — C9600 PERC DRUG-EL COR STENT SING: HCPCS | Performed by: INTERNAL MEDICINE

## 2020-06-26 PROCEDURE — C1887 CATHETER, GUIDING: HCPCS | Performed by: INTERNAL MEDICINE

## 2020-06-26 PROCEDURE — 25010000002 FENTANYL CITRATE (PF) 100 MCG/2ML SOLUTION: Performed by: INTERNAL MEDICINE

## 2020-06-26 PROCEDURE — 94640 AIRWAY INHALATION TREATMENT: CPT

## 2020-06-26 PROCEDURE — C1874 STENT, COATED/COV W/DEL SYS: HCPCS | Performed by: INTERNAL MEDICINE

## 2020-06-26 PROCEDURE — 92928 PRQ TCAT PLMT NTRAC ST 1 LES: CPT | Performed by: INTERNAL MEDICINE

## 2020-06-26 PROCEDURE — 25010000002 HEPARIN (PORCINE) PER 1000 UNITS: Performed by: INTERNAL MEDICINE

## 2020-06-26 PROCEDURE — 85347 COAGULATION TIME ACTIVATED: CPT

## 2020-06-26 PROCEDURE — 93458 L HRT ARTERY/VENTRICLE ANGIO: CPT | Performed by: INTERNAL MEDICINE

## 2020-06-26 PROCEDURE — 25010000002 EPTIFIBATIDE PER 5 MG: Performed by: INTERNAL MEDICINE

## 2020-06-26 PROCEDURE — 25010000002 MIDAZOLAM PER 1 MG: Performed by: INTERNAL MEDICINE

## 2020-06-26 PROCEDURE — C1894 INTRO/SHEATH, NON-LASER: HCPCS | Performed by: INTERNAL MEDICINE

## 2020-06-26 DEVICE — XIENCE SIERRA™ EVEROLIMUS ELUTING CORONARY STENT SYSTEM 2.50 MM X 15 MM / RAPID-EXCHANGE
Type: IMPLANTABLE DEVICE | Status: FUNCTIONAL
Brand: XIENCE SIERRA™

## 2020-06-26 DEVICE — XIENCE SIERRA™ EVEROLIMUS ELUTING CORONARY STENT SYSTEM 2.25 MM X 12 MM / RAPID-EXCHANGE
Type: IMPLANTABLE DEVICE | Status: FUNCTIONAL
Brand: XIENCE SIERRA™

## 2020-06-26 RX ORDER — MORPHINE SULFATE 2 MG/ML
1 INJECTION, SOLUTION INTRAMUSCULAR; INTRAVENOUS EVERY 4 HOURS PRN
Status: DISCONTINUED | OUTPATIENT
Start: 2020-06-26 | End: 2020-06-27 | Stop reason: HOSPADM

## 2020-06-26 RX ORDER — ATORVASTATIN CALCIUM 20 MG/1
40 TABLET, FILM COATED ORAL NIGHTLY
Status: DISCONTINUED | OUTPATIENT
Start: 2020-06-26 | End: 2020-06-26

## 2020-06-26 RX ORDER — EPTIFIBATIDE 20 MG/10ML
180 INJECTION INTRAVENOUS ONCE
Status: DISCONTINUED | OUTPATIENT
Start: 2020-06-26 | End: 2020-06-26 | Stop reason: SDUPTHER

## 2020-06-26 RX ORDER — NALOXONE HCL 0.4 MG/ML
0.4 VIAL (ML) INJECTION
Status: DISCONTINUED | OUTPATIENT
Start: 2020-06-26 | End: 2020-06-27 | Stop reason: HOSPADM

## 2020-06-26 RX ORDER — ASPIRIN 81 MG/1
81 TABLET ORAL DAILY
Status: DISCONTINUED | OUTPATIENT
Start: 2020-06-26 | End: 2020-06-27 | Stop reason: HOSPADM

## 2020-06-26 RX ORDER — FENTANYL CITRATE 50 UG/ML
INJECTION, SOLUTION INTRAMUSCULAR; INTRAVENOUS AS NEEDED
Status: DISCONTINUED | OUTPATIENT
Start: 2020-06-26 | End: 2020-06-26 | Stop reason: HOSPADM

## 2020-06-26 RX ORDER — EPTIFIBATIDE 0.75 MG/ML
INJECTION, SOLUTION INTRAVENOUS CONTINUOUS PRN
Status: COMPLETED | OUTPATIENT
Start: 2020-06-26 | End: 2020-06-26

## 2020-06-26 RX ORDER — CLOPIDOGREL BISULFATE 75 MG/1
600 TABLET ORAL ONCE
Status: DISCONTINUED | OUTPATIENT
Start: 2020-06-26 | End: 2020-06-26 | Stop reason: SDUPTHER

## 2020-06-26 RX ORDER — ACETAMINOPHEN 325 MG/1
650 TABLET ORAL EVERY 4 HOURS PRN
Status: DISCONTINUED | OUTPATIENT
Start: 2020-06-26 | End: 2020-06-27 | Stop reason: HOSPADM

## 2020-06-26 RX ORDER — MIDAZOLAM HYDROCHLORIDE 1 MG/ML
INJECTION INTRAMUSCULAR; INTRAVENOUS AS NEEDED
Status: DISCONTINUED | OUTPATIENT
Start: 2020-06-26 | End: 2020-06-26 | Stop reason: HOSPADM

## 2020-06-26 RX ORDER — EPTIFIBATIDE 0.75 MG/ML
2 INJECTION, SOLUTION INTRAVENOUS CONTINUOUS
Status: DISCONTINUED | OUTPATIENT
Start: 2020-06-26 | End: 2020-06-27

## 2020-06-26 RX ORDER — LIDOCAINE HYDROCHLORIDE 20 MG/ML
INJECTION, SOLUTION INFILTRATION; PERINEURAL AS NEEDED
Status: DISCONTINUED | OUTPATIENT
Start: 2020-06-26 | End: 2020-06-26 | Stop reason: HOSPADM

## 2020-06-26 RX ORDER — SODIUM CHLORIDE 9 MG/ML
INJECTION, SOLUTION INTRAVENOUS CONTINUOUS PRN
Status: COMPLETED | OUTPATIENT
Start: 2020-06-26 | End: 2020-06-26

## 2020-06-26 RX ORDER — MORPHINE SULFATE 2 MG/ML
INJECTION, SOLUTION INTRAMUSCULAR; INTRAVENOUS AS NEEDED
Status: DISCONTINUED | OUTPATIENT
Start: 2020-06-26 | End: 2020-06-26 | Stop reason: HOSPADM

## 2020-06-26 RX ORDER — CLOPIDOGREL BISULFATE 75 MG/1
75 TABLET ORAL DAILY
Status: DISCONTINUED | OUTPATIENT
Start: 2020-06-27 | End: 2020-06-27 | Stop reason: HOSPADM

## 2020-06-26 RX ORDER — CLOPIDOGREL BISULFATE 75 MG/1
TABLET ORAL AS NEEDED
Status: DISCONTINUED | OUTPATIENT
Start: 2020-06-26 | End: 2020-06-26 | Stop reason: HOSPADM

## 2020-06-26 RX ORDER — ATORVASTATIN CALCIUM 20 MG/1
10 TABLET, FILM COATED ORAL NIGHTLY
Status: DISCONTINUED | OUTPATIENT
Start: 2020-06-26 | End: 2020-06-27 | Stop reason: HOSPADM

## 2020-06-26 RX ORDER — NICOTINE 21 MG/24HR
1 PATCH, TRANSDERMAL 24 HOURS TRANSDERMAL EVERY 24 HOURS
Status: DISCONTINUED | OUTPATIENT
Start: 2020-06-26 | End: 2020-06-27 | Stop reason: HOSPADM

## 2020-06-26 RX ORDER — HEPARIN SODIUM 1000 [USP'U]/ML
INJECTION, SOLUTION INTRAVENOUS; SUBCUTANEOUS AS NEEDED
Status: DISCONTINUED | OUTPATIENT
Start: 2020-06-26 | End: 2020-06-26 | Stop reason: HOSPADM

## 2020-06-26 RX ADMIN — MONTELUKAST SODIUM 10 MG: 10 TABLET, FILM COATED ORAL at 00:00

## 2020-06-26 RX ADMIN — SODIUM CHLORIDE, PRESERVATIVE FREE 10 ML: 5 INJECTION INTRAVENOUS at 00:02

## 2020-06-26 RX ADMIN — ACETAMINOPHEN 650 MG: 325 TABLET, FILM COATED ORAL at 23:18

## 2020-06-26 RX ADMIN — FLUTICASONE PROPIONATE 2 SPRAY: 50 SPRAY, METERED NASAL at 09:00

## 2020-06-26 RX ADMIN — CETIRIZINE HYDROCHLORIDE 10 MG: 10 TABLET, FILM COATED ORAL at 09:01

## 2020-06-26 RX ADMIN — EPTIFIBATIDE 2 MCG/KG/MIN: 0.75 INJECTION INTRAVENOUS at 20:52

## 2020-06-26 RX ADMIN — ACETAMINOPHEN 650 MG: 325 TABLET, FILM COATED ORAL at 16:09

## 2020-06-26 RX ADMIN — NICOTINE 1 PATCH: 21 PATCH, EXTENDED RELEASE TRANSDERMAL at 16:05

## 2020-06-26 RX ADMIN — SODIUM CHLORIDE, PRESERVATIVE FREE 10 ML: 5 INJECTION INTRAVENOUS at 21:05

## 2020-06-26 RX ADMIN — PANTOPRAZOLE SODIUM 40 MG: 40 TABLET, DELAYED RELEASE ORAL at 06:02

## 2020-06-26 RX ADMIN — GUAIFENESIN 1200 MG: 600 TABLET, EXTENDED RELEASE ORAL at 21:03

## 2020-06-26 RX ADMIN — OXYBUTYNIN CHLORIDE 10 MG: 10 TABLET, EXTENDED RELEASE ORAL at 09:01

## 2020-06-26 RX ADMIN — MONTELUKAST SODIUM 10 MG: 10 TABLET, FILM COATED ORAL at 21:03

## 2020-06-26 RX ADMIN — VARENICLINE TARTRATE 1 MG: 0.5 TABLET, FILM COATED ORAL at 09:00

## 2020-06-26 RX ADMIN — ALBUTEROL SULFATE 2.5 MG: 2.5 SOLUTION RESPIRATORY (INHALATION) at 09:24

## 2020-06-26 RX ADMIN — VITAMIN D, TAB 1000IU (100/BT) 2000 UNITS: 25 TAB at 09:01

## 2020-06-26 RX ADMIN — VARENICLINE TARTRATE 1 MG: 0.5 TABLET, FILM COATED ORAL at 00:01

## 2020-06-26 RX ADMIN — SODIUM CHLORIDE, PRESERVATIVE FREE 10 ML: 5 INJECTION INTRAVENOUS at 09:02

## 2020-06-26 RX ADMIN — ORPHENADRINE CITRATE 100 MG: 100 TABLET, EXTENDED RELEASE ORAL at 21:03

## 2020-06-26 RX ADMIN — CELECOXIB 200 MG: 200 CAPSULE ORAL at 21:03

## 2020-06-26 RX ADMIN — Medication 1 CAPSULE: at 09:01

## 2020-06-26 RX ADMIN — GUAIFENESIN 1200 MG: 600 TABLET, EXTENDED RELEASE ORAL at 00:00

## 2020-06-26 RX ADMIN — ORPHENADRINE CITRATE 100 MG: 100 TABLET, EXTENDED RELEASE ORAL at 09:01

## 2020-06-26 RX ADMIN — ATORVASTATIN CALCIUM 10 MG: 20 TABLET, FILM COATED ORAL at 21:03

## 2020-06-26 RX ADMIN — ORPHENADRINE CITRATE 100 MG: 100 TABLET, EXTENDED RELEASE ORAL at 00:01

## 2020-06-26 RX ADMIN — CELECOXIB 200 MG: 200 CAPSULE ORAL at 09:01

## 2020-06-26 RX ADMIN — GUAIFENESIN 1200 MG: 600 TABLET, EXTENDED RELEASE ORAL at 09:01

## 2020-06-26 RX ADMIN — CLOPIDOGREL BISULFATE 600 MG: 75 TABLET, FILM COATED ORAL at 18:39

## 2020-06-27 ENCOUNTER — READMISSION MANAGEMENT (OUTPATIENT)
Dept: CALL CENTER | Facility: HOSPITAL | Age: 60
End: 2020-06-27

## 2020-06-27 VITALS
OXYGEN SATURATION: 95 % | DIASTOLIC BLOOD PRESSURE: 71 MMHG | HEIGHT: 64 IN | TEMPERATURE: 96.8 F | HEART RATE: 56 BPM | RESPIRATION RATE: 16 BRPM | BODY MASS INDEX: 33.03 KG/M2 | SYSTOLIC BLOOD PRESSURE: 127 MMHG | WEIGHT: 193.5 LBS

## 2020-06-27 LAB
ANION GAP SERPL CALCULATED.3IONS-SCNC: 10.9 MMOL/L (ref 5–15)
BUN BLD-MCNC: 19 MG/DL (ref 8–23)
BUN/CREAT SERPL: 17.9 (ref 7–25)
CALCIUM SPEC-SCNC: 9.1 MG/DL (ref 8.6–10.5)
CHLORIDE SERPL-SCNC: 97 MMOL/L (ref 98–107)
CO2 SERPL-SCNC: 24.1 MMOL/L (ref 22–29)
CREAT BLD-MCNC: 1.06 MG/DL (ref 0.57–1)
GFR SERPL CREATININE-BSD FRML MDRD: 53 ML/MIN/1.73
GLUCOSE BLD-MCNC: 88 MG/DL (ref 65–99)
POTASSIUM BLD-SCNC: 4.1 MMOL/L (ref 3.5–5.2)
SODIUM BLD-SCNC: 132 MMOL/L (ref 136–145)

## 2020-06-27 PROCEDURE — 99217 PR OBSERVATION CARE DISCHARGE MANAGEMENT: CPT | Performed by: NURSE PRACTITIONER

## 2020-06-27 PROCEDURE — 80048 BASIC METABOLIC PNL TOTAL CA: CPT | Performed by: INTERNAL MEDICINE

## 2020-06-27 PROCEDURE — G0378 HOSPITAL OBSERVATION PER HR: HCPCS

## 2020-06-27 PROCEDURE — 94799 UNLISTED PULMONARY SVC/PX: CPT

## 2020-06-27 PROCEDURE — 93005 ELECTROCARDIOGRAM TRACING: CPT | Performed by: INTERNAL MEDICINE

## 2020-06-27 PROCEDURE — 93010 ELECTROCARDIOGRAM REPORT: CPT | Performed by: INTERNAL MEDICINE

## 2020-06-27 RX ORDER — ASPIRIN 81 MG/1
81 TABLET ORAL DAILY
Qty: 90 TABLET | Refills: 3 | Status: SHIPPED | OUTPATIENT
Start: 2020-06-28 | End: 2021-10-05

## 2020-06-27 RX ORDER — CLOPIDOGREL BISULFATE 75 MG/1
75 TABLET ORAL DAILY
Qty: 90 TABLET | Refills: 3 | Status: SHIPPED | OUTPATIENT
Start: 2020-06-28 | End: 2021-06-21

## 2020-06-27 RX ORDER — ATORVASTATIN CALCIUM 10 MG/1
10 TABLET, FILM COATED ORAL NIGHTLY
Qty: 90 TABLET | Refills: 1 | Status: SHIPPED | OUTPATIENT
Start: 2020-06-27 | End: 2020-10-12 | Stop reason: DRUGHIGH

## 2020-06-27 RX ORDER — NICOTINE 21 MG/24HR
1 PATCH, TRANSDERMAL 24 HOURS TRANSDERMAL EVERY 24 HOURS
Qty: 30 EACH | Refills: 1 | Status: SHIPPED | OUTPATIENT
Start: 2020-06-27 | End: 2020-07-29

## 2020-06-27 RX ADMIN — VITAMIN D, TAB 1000IU (100/BT) 2000 UNITS: 25 TAB at 09:18

## 2020-06-27 RX ADMIN — ORPHENADRINE CITRATE 100 MG: 100 TABLET, EXTENDED RELEASE ORAL at 09:19

## 2020-06-27 RX ADMIN — CLOPIDOGREL 75 MG: 75 TABLET, FILM COATED ORAL at 09:19

## 2020-06-27 RX ADMIN — ALBUTEROL SULFATE 2.5 MG: 2.5 SOLUTION RESPIRATORY (INHALATION) at 09:25

## 2020-06-27 RX ADMIN — PANTOPRAZOLE SODIUM 40 MG: 40 TABLET, DELAYED RELEASE ORAL at 06:48

## 2020-06-27 RX ADMIN — CELECOXIB 200 MG: 200 CAPSULE ORAL at 09:19

## 2020-06-27 RX ADMIN — CETIRIZINE HYDROCHLORIDE 10 MG: 10 TABLET, FILM COATED ORAL at 09:19

## 2020-06-27 RX ADMIN — ASPIRIN 81 MG: 81 TABLET, COATED ORAL at 09:19

## 2020-06-27 RX ADMIN — Medication 1 CAPSULE: at 09:18

## 2020-06-27 RX ADMIN — SODIUM CHLORIDE, PRESERVATIVE FREE 10 ML: 5 INJECTION INTRAVENOUS at 09:19

## 2020-06-27 RX ADMIN — GUAIFENESIN 1200 MG: 600 TABLET, EXTENDED RELEASE ORAL at 09:18

## 2020-06-27 RX ADMIN — FLUTICASONE PROPIONATE 2 SPRAY: 50 SPRAY, METERED NASAL at 09:19

## 2020-06-27 RX ADMIN — OXYBUTYNIN CHLORIDE 10 MG: 10 TABLET, EXTENDED RELEASE ORAL at 09:18

## 2020-06-27 NOTE — OUTREACH NOTE
Prep Survey      Responses   [facility] patient discharged from?  [facility]   Is LACE score < 7 ?  Yes   Eligibility  [facility]   Date of Admission  06/24/20   Date of Discharge  06/27/20   Discharge Disposition  Home or Self Care   Discharge diagnosis  Chest pain,   left heart cath,    Ischemic heart disease   COVID-19 Test Status  Not tested   Does the patient have one of the following disease processes/diagnoses(primary or secondary)?  Other   Does the patient have Home health ordered?  No   Is there a DME ordered?  No   Prep survey completed?  Yes          Lynn Peters RN

## 2020-06-29 ENCOUNTER — TRANSITIONAL CARE MANAGEMENT TELEPHONE ENCOUNTER (OUTPATIENT)
Dept: CALL CENTER | Facility: HOSPITAL | Age: 60
End: 2020-06-29

## 2020-06-29 PROBLEM — I25.10 CAD (CORONARY ARTERY DISEASE): Status: ACTIVE | Noted: 2020-06-29

## 2020-06-29 NOTE — OUTREACH NOTE
Call Center TCM Note      Responses   [facility] patient discharged from?  [place]   COVID-19 Test Status  Not tested   Does the patient have one of the following disease processes/diagnoses(primary or secondary)?  Other   TCM attempt successful?  Yes   Call start time  1413   Call end time  1414   Discharge diagnosis  Chest pain,   left heart cath,    Ischemic heart disease   Meds reviewed with patient/caregiver?  Yes   Is the patient having any side effects they believe may be caused by any medication additions or changes?  No   Does the patient have all medications ordered at discharge?  Yes   Is the patient taking all medications as directed (includes completed medication regime)?  Yes   Does the patient have a primary care provider?   Yes   Does the patient have an appointment with their PCP within 7 days of discharge?  Yes   Comments regarding PCP  video visit with PCP tomorrow    Has the patient kept scheduled appointments due by today?  N/A   Psychosocial issues?  No   Did the patient receive a copy of their discharge instructions?  Yes   What is the patient's perception of their health status since discharge?  Improving   Is the patient/caregiver able to teach back the hierarchy of who to call/visit for symptoms/problems? PCP, Specialist, Home health nurse, Urgent Care, ED, 911  Yes   TCM call completed?  Yes   Wrap up additional comments  Patient states she is doing well, no questions or concerns at this time          Lynne Eller RN    6/29/2020, 14:14

## 2020-06-30 ENCOUNTER — TELEMEDICINE (OUTPATIENT)
Dept: INTERNAL MEDICINE | Facility: CLINIC | Age: 60
End: 2020-06-30

## 2020-06-30 VITALS
HEART RATE: 88 BPM | SYSTOLIC BLOOD PRESSURE: 141 MMHG | BODY MASS INDEX: 32.95 KG/M2 | WEIGHT: 193 LBS | HEIGHT: 64 IN | DIASTOLIC BLOOD PRESSURE: 88 MMHG

## 2020-06-30 DIAGNOSIS — I25.10 CORONARY ARTERY DISEASE INVOLVING NATIVE HEART WITHOUT ANGINA PECTORIS, UNSPECIFIED VESSEL OR LESION TYPE: Primary | ICD-10-CM

## 2020-06-30 DIAGNOSIS — K21.9 GASTROESOPHAGEAL REFLUX DISEASE, ESOPHAGITIS PRESENCE NOT SPECIFIED: ICD-10-CM

## 2020-06-30 DIAGNOSIS — F17.200 CURRENT EVERY DAY SMOKER: ICD-10-CM

## 2020-06-30 DIAGNOSIS — M79.672 LEFT FOOT PAIN: ICD-10-CM

## 2020-06-30 LAB
ACT BLD: 208 SECONDS (ref 82–152)
ACT BLD: 230 SECONDS (ref 82–152)

## 2020-06-30 PROCEDURE — 99214 OFFICE O/P EST MOD 30 MIN: CPT | Performed by: NURSE PRACTITIONER

## 2020-06-30 RX ORDER — FLUTICASONE PROPIONATE 50 MCG
2 SPRAY, SUSPENSION (ML) NASAL DAILY
Qty: 3 BOTTLE | Refills: 3 | Status: SHIPPED | OUTPATIENT
Start: 2020-06-30

## 2020-06-30 RX ORDER — PANTOPRAZOLE SODIUM 40 MG/1
40 TABLET, DELAYED RELEASE ORAL DAILY
Qty: 60 TABLET | Refills: 5 | Status: SHIPPED | OUTPATIENT
Start: 2020-06-30 | End: 2021-02-18 | Stop reason: SINTOL

## 2020-06-30 NOTE — PATIENT INSTRUCTIONS
1. CAD- start statin, discussed options, go ahead and take metoprolol twice daily, stop omeprazole and switch to pantoprazole. Watch for bleeding, stomach pain with use of celebrex, do not take with ASA.    Discuss cardiac rehab with cards f/u     2. Current every day smoker- she still has the directions from the starter pack and will follow that directions with her current pack. She has discussed with cardiology already. Notify for adverse effects.     3. Foot pain - needs to come in for further eval.

## 2020-06-30 NOTE — PROGRESS NOTES
Subjective   Flaquita Cr is a 60 y.o. female.      History of Present Illness   The patient is here today to F/U on hospitalization at [facility] June 24 through June 27.  Patient was admitted after chest pain with negative stress testing.  2 stents placed, 1 to the mid RCA and 1 to the left circumflex, patient was started on low-dose statin, aspirin and Plavix.  In addition a beta-blocker was added.    She has had trouble tolerating statins in the past. She has not started this yet.   She is taking her BB, 1 tab BID, she has been taking this some days. She held some doses. She did not take it this morning.   She is taking her ASA and plavix daily.   She is worried about the celebrex with this.     Had stomach pain with enablex at 15 mg daily dose, off right now. Would like to restart at 7.5 mg daily.     Not using nicotine patch, she is smoking 12 cigarettes a day. She was previously on chantix and had started the 2nd pack. She was actually down more cigarettes with the chantix. She would like to return to the chantix.     She also notes she has left foot aching and feels weird. Also sharp pain in heel with walking.   The following portions of the patient's history were reviewed and updated as appropriate: allergies, current medications, past family history, past medical history, past social history, past surgical history and problem list.    Review of Systems   Constitutional: Negative.    Respiratory: Positive for cough (at baseline) and shortness of breath (at baseline).    Cardiovascular: Negative.    Psychiatric/Behavioral: Negative for depressed mood. The patient is nervous/anxious (on saturday when she got home, now is better).        Objective   Physical Exam   Constitutional: She appears well-developed and well-nourished.   Pulmonary/Chest: Effort normal.   Psychiatric: She has a normal mood and affect. Her speech is normal and behavior is normal. Judgment and thought content normal.  Cognition and memory are normal.       Vitals:    06/30/20 1105   BP: 141/88   Pulse: 88     Body mass index is 33.13 kg/m².      Assessment/Plan   Flaquita was seen today for chest pain and numbness.    Diagnoses and all orders for this visit:    Coronary artery disease involving native heart without angina pectoris, unspecified vessel or lesion type    Gastroesophageal reflux disease, esophagitis presence not specified  -     pantoprazole (Protonix) 40 MG EC tablet; Take 1 tablet by mouth Daily.    Current every day smoker    Left foot pain    Other orders  -     fluticasone (FLONASE) 50 MCG/ACT nasal spray; 2 sprays into the nostril(s) as directed by provider Daily.        Visit completed via doximity, pt consents.        1. CAD- start statin, discussed options if she has SEs, go ahead and take metoprolol twice daily, stop omeprazole and switch to pantoprazole. Watch for bleeding, stomach pain with use of celebrex, do not take with ASA.    Discuss cardiac rehab with cards f/u, continue to monitor BP and give numbers to cardiology, a little high today but with out meds.     2. Current every day smoker- she still has the directions from the starter pack and will follow that directions with her current pack. She has discussed with cardiology already. Notify for adverse effects.     3. Foot pain - needs to come in for further eval. Has MRI from 2018 and has seen neurology in the past for this.

## 2020-07-06 RX ORDER — MONTELUKAST SODIUM 10 MG/1
TABLET ORAL
Qty: 90 TABLET | Refills: 0 | Status: SHIPPED | OUTPATIENT
Start: 2020-07-06 | End: 2020-09-29

## 2020-07-09 ENCOUNTER — TELEPHONE (OUTPATIENT)
Dept: CARDIOLOGY | Facility: CLINIC | Age: 60
End: 2020-07-09

## 2020-07-09 ENCOUNTER — TELEPHONE (OUTPATIENT)
Dept: INTERNAL MEDICINE | Facility: CLINIC | Age: 60
End: 2020-07-09

## 2020-07-09 RX ORDER — BACLOFEN 5 MG/1
5 TABLET ORAL 3 TIMES DAILY PRN
Qty: 60 TABLET | Refills: 1 | Status: SHIPPED | OUTPATIENT
Start: 2020-07-09 | End: 2020-07-29

## 2020-07-09 NOTE — TELEPHONE ENCOUNTER
Called patient told her to keep track of her BP reading since she is stopping her Metoprolol   Also told her the morning of her appt. Take her vitals so we will have that for her appt.

## 2020-07-09 NOTE — TELEPHONE ENCOUNTER
Patient calling was e/c from the hosp on 6/24/20  States she doesn't feel well, nauseated,tired all the time. When she lies down at night she feels like she is going to pass out, if she gets up that goes away.  States she couldn't sleep last night  She has stopped chantex and norflex thought the passing out feeling might be from that. But not the case.  She states around 5:00 this morning when she had this feeling her BP was 132/101 pulse 72  5:44 /79 pulse 82  She didn't take her morning dose of metoprolol this morning   At 9:43 her BP was 127/77 pulse 91    Takes Metoprolol 25mg 1/2 bid    She states when she takes her dose at 9:00 pm and try's  to  Lie down she has this feeling of going to pass out  This has been going on for 2-3 days      [phone]

## 2020-07-09 NOTE — TELEPHONE ENCOUNTER
Pt aware and rx sent to pharmacy.      I got fax from pharmacy that pt is wanting to change her orphenadrine to something mild and they give options for  Baclofen or methocarbamol.  I call pt and she said that's right because she is not feeling good, she get very dizzy while laying down, very tired and not feel good at all, her bp at 5 am this morning was 132/101 and later it went down. Pt spoke with cardiology and they told her to stop her bp med and keep monitoring and she have televisit with them next week.

## 2020-07-13 ENCOUNTER — OFFICE VISIT (OUTPATIENT)
Dept: CARDIOLOGY | Facility: CLINIC | Age: 60
End: 2020-07-13

## 2020-07-13 VITALS
BODY MASS INDEX: 33.02 KG/M2 | WEIGHT: 193.4 LBS | HEIGHT: 64 IN | HEART RATE: 75 BPM | SYSTOLIC BLOOD PRESSURE: 122 MMHG | DIASTOLIC BLOOD PRESSURE: 69 MMHG

## 2020-07-13 DIAGNOSIS — E78.5 HYPERLIPIDEMIA, UNSPECIFIED HYPERLIPIDEMIA TYPE: ICD-10-CM

## 2020-07-13 DIAGNOSIS — I25.118 CORONARY ARTERY DISEASE OF NATIVE ARTERY OF NATIVE HEART WITH STABLE ANGINA PECTORIS (HCC): ICD-10-CM

## 2020-07-13 DIAGNOSIS — Z95.5 STATUS POST PRIMARY ANGIOPLASTY WITH CORONARY STENT: ICD-10-CM

## 2020-07-13 DIAGNOSIS — G89.29 CHRONIC BILATERAL LOW BACK PAIN WITH LEFT-SIDED SCIATICA: ICD-10-CM

## 2020-07-13 DIAGNOSIS — M54.16 LUMBAR RADICULOPATHY: ICD-10-CM

## 2020-07-13 DIAGNOSIS — M54.42 CHRONIC BILATERAL LOW BACK PAIN WITH LEFT-SIDED SCIATICA: ICD-10-CM

## 2020-07-13 DIAGNOSIS — Z95.5 STATUS POST INSERTION OF DRUG-ELUTING STENT INTO RIGHT CORONARY ARTERY FOR CORONARY ARTERY DISEASE: ICD-10-CM

## 2020-07-13 DIAGNOSIS — R00.2 PALPITATIONS: ICD-10-CM

## 2020-07-13 DIAGNOSIS — J44.9 CHRONIC OBSTRUCTIVE PULMONARY DISEASE, UNSPECIFIED COPD TYPE (HCC): ICD-10-CM

## 2020-07-13 DIAGNOSIS — I73.9 PAD (PERIPHERAL ARTERY DISEASE) (HCC): ICD-10-CM

## 2020-07-13 DIAGNOSIS — I73.9 PVD (PERIPHERAL VASCULAR DISEASE) WITH CLAUDICATION (HCC): Primary | ICD-10-CM

## 2020-07-13 PROCEDURE — 99214 OFFICE O/P EST MOD 30 MIN: CPT | Performed by: NURSE PRACTITIONER

## 2020-07-13 NOTE — PROGRESS NOTES
Date of Office Visit: 2020  Encounter Provider: ISAAK Baum  Place of Service: [facility] CARDIOLOGY  Patient Name: Flaquita Cr  :1960    Chief Complaint   Patient presents with   • Coronary Artery Disease     1 [facility] HOSP FOLLOW UP   :     HPI: Flaquita Cr is a 60 y.o. female who presents today for follow-up of her coronary disease.  She was seen by Dr. Robins chondroitin is known to me from the hospital.  Patient is a previous patient of Dr. Bright.  She had seen him for peripheral arterial disease and actually had undergone a lower extremity bypass.  Patient had come to [facility] and had a EKG consistent with a right bundle branch block.  She had also complained of some chest discomfort.  She ruled out for MI and underwent stress testing that was negative for ischemia but she had some discomfort when she was walking on the treadmill in the chest.  Due to patient's symptoms she underwent coronary angiography.  Had a normal EF of 63%.  Coronary angiography revealed a normal left main, proximal LAD was normal, mid segment was 30% and distal luminal irregularities.  Diagonal was without narrowing.  There was a large ramus which was normal.  Circumflex was a small vessel in the first OM and the proximal segment had a 50% stenosis.  There was a second marginal that was narrowed 75% and then bifurcating into 2 branches 1 of the inferior branch was 30 to 40%.  The right coronary proximally was normal but the mid segment had a 95% stenosis.  The PDA was without disease.  Patient underwent angioplasty and stenting with a 2.25 x 12 mm Xience Monique drug-eluting stent to the circumflex lesion and a 2.5 x 15 mm Xience Monique drug-eluting stent to the mid RCA.  Circumflex lesion.  She was started on dual antiplatelet therapy with aspirin and Plavix.  She was also started on low-dose Lipitor as well as beta-blocker.  Couple days after discharge she started  having some dizziness.  None times when she would lay down at night she would almost feel like she was going to pass out.  She felt like it was related to the metoprolol.  So she stopped this medication and she is not have the symptoms since.  Not having any chest discomfort but does get pain in her legs when she is walking around.  It appears in her calfs and she has trouble walking from the grocery store parking lot into the grocery and sometimes has to lean on the cart when she is walking around.  She has chronic back pain and sacroiliac pain and sometimes when she is sitting and props her legs up she will get a sharp pain in her right foot.  But this does not appear to be the same type of pain.  She says this pain is similar to what she had when she had her peripheral artery bypass.      Past Medical History:   Diagnosis Date   • Allergy     Allergies   • Arthritis     failed naproxena dn meloxicam - celebrex works well   • Asthma    • Chest pain    • Circulation problem    • Colon polyp    • COPD (chronic obstructive pulmonary disease) (CMS/[facility])     does not use O2   • Depression    • Emphysema of lung (CMS/[facility])    • Fatigue    • Gastritis    • GERD (gastroesophageal reflux disease)    • Hyperlipidemia    • Hypertension     just started, no meds   • IBS (irritable bowel syndrome)    • Low back pain    • OAB (overactive bladder)    • Ovarian cyst 1989   • PAD (peripheral artery disease) (CMS/[facility])    • Peripheral arterial disease (CMS/[facility])    • Right leg pain    • Sleep apnea    • Tobacco abuse        Past Surgical History:   Procedure Laterality Date   • AORTA FEMORAL BYPASS  2011   • CARDIAC CATHETERIZATION N/A 6/26/2020    Procedure: Stent OSIRIS coronary;  Surgeon: David Higgins MD;  Location: [facility] CATH INVASIVE LOCATION;  Service: Cardiology;  Laterality: N/A;   • CARDIAC CATHETERIZATION N/A 6/26/2020    Procedure: Coronary angiography;  Surgeon: David Higgins MD;  Location: [facility] CATH INVASIVE  LOCATION;  Service: Cardiology;  Laterality: N/A;   • CARDIAC CATHETERIZATION N/A 6/26/2020    Procedure: Aortic root aortogram;  Surgeon: David Higgins MD;  Location:  [facility] CATH INVASIVE LOCATION;  Service: Cardiology;  Laterality: N/A;   • CARDIAC CATHETERIZATION N/A 6/26/2020    Procedure: Percutaneous Coronary Intervention;  Surgeon: David Higgins MD;  Location:  [facility] CATH INVASIVE LOCATION;  Service: Cardiology;  Laterality: N/A;   • COLONOSCOPY N/A 8/24/2017    NBIH, diverticulosis, four 5 to 6 mm polyps (one tubular adenoma)   • EPIDURAL BLOCK     • HYSTERECTOMY  1988    endometriosis   • LEG SURGERY Bilateral     for PAD   • ROOT CANAL      R upper jaw with rootcanal       Social History     Socioeconomic History   • Marital status:      Spouse name: Not on file   • Number of children: 3   • Years of education: 12   • Highest education level: Not on file   Occupational History   • Occupation: disability for back problems   Tobacco Use   • Smoking status: Current Every Day Smoker     Years: 88.00     Types: Electronic Cigarette, Cigarettes     Start date: 1970   • Smokeless tobacco: Never Used   • Tobacco comment: Began smoking at age 10.  Smoked 1 ppd for 15 years, 2 ppd for 18 years, and 2.5 ppd for 15 years for an 88.5 pack year history.  Currently smoking 4 tobacco cigarettes daily with frequent use of an e-cigarette.  Using e-cig since 2010.   Substance and Sexual Activity   • Alcohol use: Yes     Comment: once per year, holidays/ CAFFEINE USE: 2 CUPS COFFEE DAILY   • Drug use: No   • Sexual activity: Defer   Social History Narrative    LIVES WITH SPOUSE       Family History   Problem Relation Age of Onset   • Osteoporosis Mother    • Atrial fibrillation Mother         pacemaker   • Arthritis Mother    • Alzheimer's disease Mother    • Macular degeneration Father    • Cancer Father    • Arthritis Sister    • Gout Brother    • Arthritis Brother    • Colon cancer Maternal Aunt    •  Uterine cancer Maternal Aunt    • Breast cancer Neg Hx        Review of Systems   Constitution: Negative for diaphoresis and malaise/fatigue.   Cardiovascular: Negative for chest pain, claudication, dyspnea on exertion, irregular heartbeat, leg swelling, near-syncope, orthopnea, palpitations, paroxysmal nocturnal dyspnea and syncope.   Respiratory: Negative for cough, shortness of breath and sleep disturbances due to breathing.    Musculoskeletal: Positive for back pain and joint pain. Negative for falls.   Neurological: Negative for dizziness and weakness.   Psychiatric/Behavioral: Negative for altered mental status and substance abuse.       Allergies   Allergen Reactions   • Doxycycline GI Intolerance   • Lortab [Hydrocodone-Acetaminophen] Itching   • Macrobid [Nitrofurantoin Macrocrystal] Myalgia         Current Outpatient Medications:   •  Acetaminophen (TYLENOL ARTHRITIS PAIN PO), Take 650 mg by mouth 2 (Two) Times a Day As Needed., Disp: , Rfl:   •  aspirin 81 MG EC tablet, Take 1 tablet by mouth Daily., Disp: 90 tablet, Rfl: 3  •  atorvastatin (LIPITOR) 10 MG tablet, Take 1 tablet by mouth Every Night., Disp: 90 tablet, Rfl: 1  •  celecoxib (CeleBREX) 200 MG capsule, Take 1 capsule by mouth 2 (Two) Times a Day., Disp: 180 capsule, Rfl: 1  •  cetirizine (ZyrTEC Allergy) 10 MG tablet, Take 1 tablet by mouth Daily., Disp: 180 tablet, Rfl: 1  •  Cholecalciferol (VITAMIN D) 50 MCG (2000 UT) tablet, Take 2,000 Units by mouth Daily., Disp: 30 tablet, Rfl: 5  •  clopidogrel (PLAVIX) 75 MG tablet, Take 1 tablet by mouth Daily., Disp: 90 tablet, Rfl: 3  •  diphenhydrAMINE (BENADRYL) 25 mg capsule, Take 25 mg by mouth Every 6 (Six) Hours As Needed for Itching., Disp: , Rfl:   •  flunisolide (NASALIDE) 25 MCG/ACT (0.025%) solution nasal spray, SPRAY TWO SPRAYS IN EACH NOSTRIL EVERY 12 HOURS, Disp: 25 mL, Rfl: 2  •  fluticasone (FLONASE) 50 MCG/ACT nasal spray, 2 sprays into the nostril(s) as directed by provider Daily.,  "Disp: 3 bottle, Rfl: 3  •  Fluticasone-Umeclidin-Vilant (TRELEGY ELLIPTA) 100-62.5-25 MCG/INH aerosol powder , Inhale 1 puff., Disp: , Rfl:   •  guaiFENesin (Mucinex) 600 MG 12 hr tablet, Take 2 tablets by mouth 2 (Two) Times a Day., Disp: 60 tablet, Rfl: 5  •  montelukast (SINGULAIR) 10 MG tablet, TAKE 1 TABLET BY MOUTH EVERY DAY AT NIGHT, Disp: 90 tablet, Rfl: 0  •  MULTIPLE VITAMIN PO, Take 1 tablet by mouth Daily., Disp: , Rfl:   •  pantoprazole (Protonix) 40 MG EC tablet, Take 1 tablet by mouth Daily., Disp: 60 tablet, Rfl: 5  •  PROAIR  (90 Base) MCG/ACT inhaler, INHALE TWO PUFFS BY MOUTH EVERY 4 HOURS AS NEEDED FOR WHEEZING OR SHORTNESS OF AIR, Disp: 8.5 g, Rfl: 0  •  Probiotic capsule, Take 1 capsule by mouth Daily., Disp: 30 capsule, Rfl: 5  •  azelastine (ASTEPRO) 0.15 % solution nasal spray, 1 spray into the nostril(s) as directed by provider Daily., Disp: , Rfl:   •  Baclofen 5 MG tablet, Take 5 mg by mouth 3 (Three) Times a Day As Needed (take 1 tab 3 times a day prn.). Hydrate well, do not take with ETOH or operate heavy machinery., Disp: 60 tablet, Rfl: 1  •  HYDROcodone-acetaminophen (LORTAB) 7.5-325 MG per tablet, Take 1 tablet by mouth Every 6 (Six) Hours As Needed for Moderate Pain ., Disp: , Rfl:   •  metoprolol tartrate (LOPRESSOR) 25 MG tablet, Take 0.5 tablets by mouth 2 (Two) Times a Day., Disp: 45 tablet, Rfl: 3  •  nicotine (NICODERM CQ) 21 MG/24HR patch, Place 1 patch on the skin as directed by provider Daily., Disp: 30 each, Rfl: 1  •  Spacer/Aero Chamber Mouthpiece misc, Use spacer with all inhaled treatments, Disp: 1 each, Rfl: 1      Objective:     Vitals:    07/13/20 1417   BP: 122/69   Pulse: 75   Weight: 87.7 kg (193 lb 6.4 oz)   Height: 162.6 cm (64\")     Body mass index is 33.2 kg/m².    PHYSICAL EXAM:    Physical Exam   Constitutional: She is oriented to person, place, and time. She appears well-developed and well-nourished.   HENT:   Head: Normocephalic.   Neck: Normal " range of motion.   Neurological: She is alert and oriented to person, place, and time.       Procedures      Assessment:       Diagnosis Plan   1. PVD (peripheral vascular disease) with claudication (CMS/[facility])  Doppler Arterial Multi Level Lower Extremity - Bilateral CAR    Lipid Panel    Comprehensive Metabolic Panel   2. Palpitations  Holter Monitor - 48 Hour   3. Coronary artery disease of native artery of native heart with stable angina pectoris (CMS/[facility])  Ambulatory Referral to Cardiac Rehab    Ambulatory Referral to Nutrition Services   4. PAD (peripheral artery disease) (CMS/[facility])     5. Hyperlipidemia, unspecified hyperlipidemia type     6. Chronic obstructive pulmonary disease, unspecified COPD type (CMS/[facility])     7. Lumbar radiculopathy     8. Chronic bilateral low back pain with left-sided sciatica     9. Status post insertion of drug-eluting stent into right coronary artery for coronary artery disease     10. Status post primary angioplasty with coronary stent       Orders Placed This Encounter   Procedures   • Lipid Panel     Standing Status:   Future     Standing Expiration Date:   7/13/2021   • Comprehensive Metabolic Panel     Standing Status:   Future     Standing Expiration Date:   7/13/2021   • Ambulatory Referral to Cardiac Rehab     Referral Priority:   Routine     Referral Type:   Rehabilitation - Outpatient     Number of Visits Requested:   1   • Ambulatory Referral to Nutrition Services     Referral Priority:   Routine     Referral Type:   Health Education     Referral Reason:   Specialty Services Required     Number of Visits Requested:   1   • Holter Monitor - 48 Hour     Standing Status:   Future     Standing Expiration Date:   7/13/2021     Order Specific Question:   Reason for exam?     Answer:   Presyncope or Syncope     Order Specific Question:   Reason for exam?     Answer:   Dizziness          Plan:       Mrs. Cr has a lot of things going on.  She has symptoms of claudication in  her legs which she has had peripheral arterial disease before Imelda to do an arterial GISELLE to see what her severity of limb ischemia is the other thing is this dizziness could have been related to the beta-blocker may be she was having some slowing or pauses.  So I am getting have her wear a Holter monitor.  Her blood pressure is stable at this time and we can consider starting a low-dose ACE inhibitor if needed in the future but for now I am going to leave her off the metoprolol due to significant dizziness that resolved when she stopped it.  In terms of her cholesterol I am going to repeat a lipid panel in September to see how the Lipitor is working for her her LDLs on her last lab draw were very high and I suspect she is probably getting need more than 10 mg a day of Lipitor.  She wants to see a nutritionist and I encouraged her to start cardiac rehab.  I have put in a referral for both.  Going to follow-up with Dr. Higgins in a month.    This patient has consented to a telehealth visit via video. The visit was scheduled as a video visit to comply with patient safety concerns in accordance with CDC recommendations.  All vitals recorded within this visit are reported by the patient.  I spent 35 minutes in total including but not limited to the 28 minutes spent in direct conversation with this patient.        Your medication list           Accurate as of July 13, 2020  3:25 PM. If you have any questions, ask your nurse or doctor.               CONTINUE taking these medications      Instructions Last Dose Given Next Dose Due   aspirin 81 MG EC tablet      Take 1 tablet by mouth Daily.       atorvastatin 10 MG tablet  Commonly known as:  LIPITOR      Take 1 tablet by mouth Every Night.       azelastine 0.15 % solution nasal spray  Commonly known as:  ASTEPRO      1 spray into the nostril(s) as directed by provider Daily.       Baclofen 5 MG tablet      Take 5 mg by mouth 3 (Three) Times a Day As Needed (take 1 tab 3  times a day prn.). Hydrate well, do not take with ETOH or operate heavy machinery.       celecoxib 200 MG capsule  Commonly known as:  CeleBREX      Take 1 capsule by mouth 2 (Two) Times a Day.       cetirizine 10 MG tablet  Commonly known as:  ZyrTEC Allergy      Take 1 tablet by mouth Daily.       clopidogrel 75 MG tablet  Commonly known as:  PLAVIX      Take 1 tablet by mouth Daily.       diphenhydrAMINE 25 mg capsule  Commonly known as:  BENADRYL      Take 25 mg by mouth Every 6 (Six) Hours As Needed for Itching.       flunisolide 25 MCG/ACT (0.025%) solution nasal spray  Commonly known as:  NASALIDE      SPRAY TWO SPRAYS IN EACH NOSTRIL EVERY 12 HOURS       fluticasone 50 MCG/ACT nasal spray  Commonly known as:  FLONASE      2 sprays into the nostril(s) as directed by provider Daily.       guaiFENesin 600 MG 12 hr tablet  Commonly known as:  Mucinex      Take 2 tablets by mouth 2 (Two) Times a Day.       Lortab 7.5-325 MG per tablet  Generic drug:  HYDROcodone-acetaminophen      Take 1 tablet by mouth Every 6 (Six) Hours As Needed for Moderate Pain .       metoprolol tartrate 25 MG tablet  Commonly known as:  LOPRESSOR      Take 0.5 tablets by mouth 2 (Two) Times a Day.       montelukast 10 MG tablet  Commonly known as:  SINGULAIR      TAKE 1 TABLET BY MOUTH EVERY DAY AT NIGHT       MULTIPLE VITAMIN PO      Take 1 tablet by mouth Daily.       nicotine 21 MG/24HR patch  Commonly known as:  NICODERM CQ      Place 1 patch on the skin as directed by provider Daily.       pantoprazole 40 MG EC tablet  Commonly known as:  Protonix      Take 1 tablet by mouth Daily.       ProAir  (90 Base) MCG/ACT inhaler  Generic drug:  albuterol sulfate HFA      INHALE TWO PUFFS BY MOUTH EVERY 4 HOURS AS NEEDED FOR WHEEZING OR SHORTNESS OF AIR       Probiotic capsule      Take 1 capsule by mouth Daily.       Spacer/Aero Chamber Mouthpiece misc      Use spacer with all inhaled treatments       Trelegy Ellipta 100-62.5-25  MCG/INH aerosol powder   Generic drug:  Fluticasone-Umeclidin-Vilant      Inhale 1 puff.       TYLENOL ARTHRITIS PAIN PO      Take 650 mg by mouth 2 (Two) Times a Day As Needed.       Vitamin D 50 MCG (2000 UT) tablet      Take 2,000 Units by mouth Daily.                As always, it has been a pleasure to participate in your patient's care.      Sincerely,     Jacki MULLIGAN

## 2020-07-15 ENCOUNTER — TELEPHONE (OUTPATIENT)
Dept: CARDIAC REHAB | Facility: HOSPITAL | Age: 60
End: 2020-07-15

## 2020-07-24 ENCOUNTER — HOSPITAL ENCOUNTER (OUTPATIENT)
Dept: CARDIOLOGY | Facility: HOSPITAL | Age: 60
Discharge: HOME | End: 2020-07-24
Admitting: NURSE PRACTITIONER

## 2020-07-24 ENCOUNTER — HOSPITAL ENCOUNTER (OUTPATIENT)
Dept: DIABETES SERVICES | Facility: HOSPITAL | Age: 60
Discharge: HOME | End: 2020-07-24

## 2020-07-24 DIAGNOSIS — I73.9 PVD (PERIPHERAL VASCULAR DISEASE) WITH CLAUDICATION (HCC): ICD-10-CM

## 2020-07-24 LAB
BH CV LOWER ARTERIAL LEFT ABI RATIO: 0.95
BH CV LOWER ARTERIAL LEFT HIGH THIGH SYS MAX: 163 MMHG
BH CV LOWER ARTERIAL LEFT POPLITEAL SYS MAX: 149 MMHG
BH CV LOWER ARTERIAL LEFT POST TIBIAL SYS MAX: 140 MMHG
BH CV LOWER ARTERIAL RIGHT ABI RATIO: 0.87
BH CV LOWER ARTERIAL RIGHT HIGH THIGH SYS MAX: 115 MMHG
BH CV LOWER ARTERIAL RIGHT POPLITEAL SYS MAX: 121 MMHG
BH CV LOWER ARTERIAL RIGHT POST TIBIAL SYS MAX: 129 MMHG
UPPER ARTERIAL LEFT ARM BRACHIAL SYS MAX: 148 MMHG
UPPER ARTERIAL RIGHT ARM BRACHIAL SYS MAX: 142 MMHG

## 2020-07-24 PROCEDURE — 93923 UPR/LXTR ART STDY 3+ LVLS: CPT | Performed by: INTERNAL MEDICINE

## 2020-07-24 PROCEDURE — 97802 MEDICAL NUTRITION INDIV IN: CPT | Performed by: DIETITIAN, REGISTERED

## 2020-07-24 PROCEDURE — 93923 UPR/LXTR ART STDY 3+ LVLS: CPT

## 2020-07-27 ENCOUNTER — TELEPHONE (OUTPATIENT)
Dept: CARDIAC REHAB | Facility: HOSPITAL | Age: 60
End: 2020-07-27

## 2020-07-27 ENCOUNTER — TELEPHONE (OUTPATIENT)
Dept: CARDIOLOGY | Facility: CLINIC | Age: 60
End: 2020-07-27

## 2020-07-27 DIAGNOSIS — I73.9 PVD (PERIPHERAL VASCULAR DISEASE) WITH CLAUDICATION (HCC): Primary | ICD-10-CM

## 2020-07-28 ENCOUNTER — TELEPHONE (OUTPATIENT)
Dept: CARDIOLOGY | Facility: CLINIC | Age: 60
End: 2020-07-28

## 2020-07-28 NOTE — TELEPHONE ENCOUNTER
Advised pt of Doppler Results and of Jacki's recommendations. Pt wrote down Dr. Bañuelos's info. Pt then stated she's seen Dr. Hahn prior in the same office and would like to see him instead. I advised her to just make them aware when they contact her and they would assist. Pt verbalized all understanding.    KEITH Santana

## 2020-07-29 ENCOUNTER — TELEPHONE (OUTPATIENT)
Dept: CARDIOLOGY | Facility: CLINIC | Age: 60
End: 2020-07-29

## 2020-07-29 ENCOUNTER — TELEMEDICINE (OUTPATIENT)
Dept: INTERNAL MEDICINE | Facility: CLINIC | Age: 60
End: 2020-07-29

## 2020-07-29 VITALS
BODY MASS INDEX: 32.83 KG/M2 | HEIGHT: 64 IN | HEART RATE: 100 BPM | WEIGHT: 192.3 LBS | SYSTOLIC BLOOD PRESSURE: 123 MMHG | DIASTOLIC BLOOD PRESSURE: 79 MMHG

## 2020-07-29 DIAGNOSIS — M54.42 CHRONIC LEFT-SIDED LOW BACK PAIN WITH LEFT-SIDED SCIATICA: Primary | ICD-10-CM

## 2020-07-29 DIAGNOSIS — G89.29 CHRONIC LEFT-SIDED LOW BACK PAIN WITH LEFT-SIDED SCIATICA: Primary | ICD-10-CM

## 2020-07-29 DIAGNOSIS — R53.83 FATIGUE, UNSPECIFIED TYPE: ICD-10-CM

## 2020-07-29 PROCEDURE — 99442 PR PHYS/QHP TELEPHONE EVALUATION 11-20 MIN: CPT | Performed by: NURSE PRACTITIONER

## 2020-07-29 RX ORDER — BACLOFEN 10 MG/1
10 TABLET ORAL 3 TIMES DAILY
Qty: 90 TABLET | Refills: 3 | Status: SHIPPED | OUTPATIENT
Start: 2020-07-29 | End: 2021-06-02 | Stop reason: SDUPTHER

## 2020-07-29 NOTE — PROGRESS NOTES
Subjective   Flaquita Cr is a 60 y.o. female.     History of Present Illness    The patient is here today with c/o low back and hip pain. Hx of chronic back pain cervical spine stenosis, and lumbar radiculopathy. The baclofen was not strong enough.   Recently changed from norflex to baclofen. She thought it was causing her fatigue and nausea.     Stopped chantix, metoprolol. The nausea is better with changing when she takes the protonix.   Fatigue is persisting, she has had several sleep studies in the past, has a CPAP but pulls off mask at night. She is working on treating her allergies.   Holter nl.     She is to see vascular for her abnormal GISELLE.   The following portions of the patient's history were reviewed and updated as appropriate: allergies, current medications, past family history, past medical history, past social history, past surgical history and problem list.    Review of Systems   Constitutional: Negative.    Respiratory: Negative.    Cardiovascular: Negative.    Musculoskeletal: Positive for back pain.   Psychiatric/Behavioral: Negative.  Positive for stress.       Objective   Physical Exam   Psychiatric: She has a normal mood and affect. Her speech is normal and behavior is normal. Judgment and thought content normal. Cognition and memory are normal.       Vitals:    07/29/20 1548   BP: 123/79   Pulse: 100     Body mass index is 33.01 kg/m².      Assessment/Plan   Flaquita was seen today for back pain, hip pain, leg pain, feet pain and insomnia.    Diagnoses and all orders for this visit:    Chronic left-sided low back pain with left-sided sciatica    Fatigue, unspecified type    Other orders  -     baclofen (LIORESAL) 10 MG tablet; Take 1 tablet by mouth 3 (Three) Times a Day.        Visit completed via telephone, pt consents, lasted 14 minutes.          1. Chronic back pain- ok for increase of baclofen to 10 mg TID, suggest pain management referral, no ETOH or operating heavy machinery  2.  Fatigue- return to CPAP

## 2020-08-04 ENCOUNTER — OFFICE VISIT (OUTPATIENT)
Dept: CARDIOLOGY | Facility: CLINIC | Age: 60
End: 2020-08-04

## 2020-08-04 VITALS
SYSTOLIC BLOOD PRESSURE: 184 MMHG | DIASTOLIC BLOOD PRESSURE: 92 MMHG | WEIGHT: 194.4 LBS | OXYGEN SATURATION: 97 % | BODY MASS INDEX: 33.19 KG/M2 | HEART RATE: 71 BPM | HEIGHT: 64 IN

## 2020-08-04 DIAGNOSIS — I25.10 CORONARY ARTERY DISEASE INVOLVING NATIVE CORONARY ARTERY OF NATIVE HEART WITHOUT ANGINA PECTORIS: Primary | ICD-10-CM

## 2020-08-04 DIAGNOSIS — Z72.0 TOBACCO ABUSE: ICD-10-CM

## 2020-08-04 DIAGNOSIS — E78.5 HYPERLIPIDEMIA, UNSPECIFIED HYPERLIPIDEMIA TYPE: ICD-10-CM

## 2020-08-04 DIAGNOSIS — J44.9 CHRONIC OBSTRUCTIVE PULMONARY DISEASE, UNSPECIFIED COPD TYPE (HCC): ICD-10-CM

## 2020-08-04 DIAGNOSIS — I10 ESSENTIAL HYPERTENSION: ICD-10-CM

## 2020-08-04 PROCEDURE — 99214 OFFICE O/P EST MOD 30 MIN: CPT | Performed by: INTERNAL MEDICINE

## 2020-08-04 PROCEDURE — 93000 ELECTROCARDIOGRAM COMPLETE: CPT | Performed by: INTERNAL MEDICINE

## 2020-08-04 RX ORDER — NEBIVOLOL 10 MG/1
10 TABLET ORAL DAILY
Qty: 30 TABLET | Refills: 1 | Status: SHIPPED | OUTPATIENT
Start: 2020-08-04 | End: 2020-08-06

## 2020-08-04 NOTE — PROGRESS NOTES
Date of Office Visit: 2020    Patient Name: Flaquita Cr  : 1960    Encounter Provider: David Higgins MD  Referring Provider: No ref. provider found  Place of Service: [facility]  Patient Care Team:  Cadence Archuleta APRN as PCP - General (Family Medicine)  Hugo Mims MD as Consulting Physician (Gastroenterology)  Shannan Zuleta APRN as Nurse Practitioner (Oncology)      Chief Complaint   Patient presents with   • Follow-up     20     History of Present Illness  The patient is a 60-year-old white female who was recently hospitalized with unstable angina pectoris.  She underwent a catheterization and ultimately had 2 drug-eluting stents placed 1 each to the circumflex as well as the right coronary artery.  Left ventricular function was normal.  The patient returns today without complaints of angina pectoris.  She does complain of shortness of breath but she has severe underlying COPD.  Intermittently she will complain of lightheadedness and easy fatigability.  There was a thought that her metoprolol might be causing some of her problems.  I am not sure.  Today her blood pressure is quite elevated.  Unfortunately she still smokes quite a bit.    Past Medical History:   Diagnosis Date   • Allergy     Allergies   • Arthritis     failed naproxena dn meloxicam - celebrex works well   • Asthma    • Chest pain    • Circulation problem    • Colon polyp    • COPD (chronic obstructive pulmonary disease) (CMS/[facility])     does not use O2   • Depression    • Emphysema of lung (CMS/[facility])    • Fatigue    • Gastritis    • GERD (gastroesophageal reflux disease)    • Hyperlipidemia    • Hypertension     just started, no meds   • IBS (irritable bowel syndrome)    • Low back pain    • OAB (overactive bladder)    • Ovarian cyst    • PAD (peripheral artery disease) (CMS/[facility])    • Peripheral arterial disease (CMS/[facility])    • Right leg pain    •  Sleep apnea    • Tobacco abuse          Past Surgical History:   Procedure Laterality Date   • AORTA FEMORAL BYPASS  2011   • CARDIAC CATHETERIZATION N/A 6/26/2020    Procedure: Stent OSIRIS coronary;  Surgeon: David Higgins MD;  Location:  [facility];  Service: Cardiology;  Laterality: N/A;   • CARDIAC CATHETERIZATION N/A 6/26/2020    Procedure: Coronary angiography;  Surgeon: David Higgins MD;  Location:  [facility];  Service: Cardiology;  Laterality: N/A;   • CARDIAC CATHETERIZATION N/A 6/26/2020    Procedure: Aortic root aortogram;  Surgeon: David Higgins MD;  Location:  [facility];  Service: Cardiology;  Laterality: N/A;   • CARDIAC CATHETERIZATION N/A 6/26/2020    Procedure: Percutaneous Coronary Intervention;  Surgeon: David Higgins MD;  Location:  [facility];  Service: Cardiology;  Laterality: N/A;   • COLONOSCOPY N/A 8/24/2017    NBIH, diverticulosis, four 5 to 6 mm polyps (one tubular adenoma)   • EPIDURAL BLOCK     • HYSTERECTOMY  1988    endometriosis   • LEG SURGERY Bilateral     for PAD   • ROOT CANAL      R upper jaw with rootcanal           Current Outpatient Medications:   •  Acetaminophen (TYLENOL ARTHRITIS PAIN PO), Take 650 mg by mouth 2 (Two) Times a Day As Needed., Disp: , Rfl:   •  aspirin 81 MG EC tablet, Take 1 tablet by mouth Daily., Disp: 90 tablet, Rfl: 3  •  atorvastatin (LIPITOR) 10 MG tablet, Take 1 tablet by mouth Every Night., Disp: 90 tablet, Rfl: 1  •  azelastine (ASTEPRO) 0.15 % solution nasal spray, 1 spray into the nostril(s) as directed by provider Daily., Disp: , Rfl:   •  baclofen (LIORESAL) 10 MG tablet, Take 1 tablet by mouth 3 (Three) Times a Day., Disp: 90 tablet, Rfl: 3  •  celecoxib (CeleBREX) 200 MG capsule, Take 1 capsule by mouth 2 (Two) Times a Day., Disp: 180 capsule, Rfl: 1  •  cetirizine (ZyrTEC Allergy) 10 MG tablet, Take 1 tablet by mouth Daily., Disp: 180 tablet,  Rfl: 1  •  Cholecalciferol (VITAMIN D) 50 MCG (2000 UT) tablet, Take 2,000 Units by mouth Daily., Disp: 30 tablet, Rfl: 5  •  clopidogrel (PLAVIX) 75 MG tablet, Take 1 tablet by mouth Daily., Disp: 90 tablet, Rfl: 3  •  diphenhydrAMINE (BENADRYL) 25 mg capsule, Take 25 mg by mouth Every 6 (Six) Hours As Needed for Itching., Disp: , Rfl:   •  flunisolide (NASALIDE) 25 MCG/ACT (0.025%) solution nasal spray, SPRAY TWO SPRAYS IN EACH NOSTRIL EVERY 12 HOURS, Disp: 25 mL, Rfl: 2  •  fluticasone (FLONASE) 50 MCG/ACT nasal spray, 2 sprays into the nostril(s) as directed by provider Daily., Disp: 3 bottle, Rfl: 3  •  Fluticasone-Umeclidin-Vilant (TRELEGY ELLIPTA) 100-62.5-25 MCG/INH aerosol powder , Inhale 1 puff., Disp: , Rfl:   •  guaiFENesin (Mucinex) 600 MG 12 hr tablet, Take 2 tablets by mouth 2 (Two) Times a Day., Disp: 60 tablet, Rfl: 5  •  HYDROcodone-acetaminophen (LORTAB) 7.5-325 MG per tablet, Take 1 tablet by mouth Every 6 (Six) Hours As Needed for Moderate Pain ., Disp: , Rfl:   •  montelukast (SINGULAIR) 10 MG tablet, TAKE 1 TABLET BY MOUTH EVERY DAY AT NIGHT, Disp: 90 tablet, Rfl: 0  •  MULTIPLE VITAMIN PO, Take 1 tablet by mouth Daily., Disp: , Rfl:   •  pantoprazole (Protonix) 40 MG EC tablet, Take 1 tablet by mouth Daily., Disp: 60 tablet, Rfl: 5  •  PROAIR  (90 Base) MCG/ACT inhaler, INHALE TWO PUFFS BY MOUTH EVERY 4 HOURS AS NEEDED FOR WHEEZING OR SHORTNESS OF AIR, Disp: 8.5 g, Rfl: 0  •  Probiotic capsule, Take 1 capsule by mouth Daily., Disp: 30 capsule, Rfl: 5      Social History     Socioeconomic History   • Marital status:      Spouse name: Not on file   • Number of children: 3   • Years of education: 12   • Highest education level: Not on file   Occupational History   • Occupation: disability for back problems   Tobacco Use   • Smoking status: Current Every Day Smoker     Years: 88.00     Types: Electronic Cigarette, Cigarettes     Start date: 1970   • Smokeless tobacco: Never Used   •  "Tobacco comment: Began smoking at age 10.  Smoked 1 ppd for 15 years, 2 ppd for 18 years, and 2.5 ppd for 15 years for an 88.5 pack year history.  Currently smoking 4 tobacco cigarettes daily with frequent use of an e-cigarette.  Using e-cig since 2010.   Substance and Sexual Activity   • Alcohol use: Yes     Comment: once per year, holidays/ CAFFEINE USE: 2 CUPS COFFEE DAILY   • Drug use: No   • Sexual activity: Defer   Social History Narrative    LIVES WITH SPOUSE         Review of Systems   Constitution: Positive for malaise/fatigue.   HENT: Negative.    Eyes: Negative.    Cardiovascular: Positive for dyspnea on exertion.   Respiratory: Positive for shortness of breath.    Endocrine: Negative.    Skin: Negative.    Musculoskeletal: Negative.    Gastrointestinal: Negative.    Neurological: Positive for light-headedness.   Psychiatric/Behavioral: Negative.        Procedures      ECG 12 Lead  Date/Time: 8/4/2020 1:32 PM  Performed by: David Higgins MD  Authorized by: David Higgins MD   Comparison: compared with previous ECG from 6/27/2020  Similar to previous ECG  Rhythm: sinus rhythm  Rate: normal  Conduction: right bundle branch block  QRS axis: normal                  Objective:    BP (!) 184/92 (BP Location: Left arm, Patient Position: Sitting, Cuff Size: Adult)   Pulse 71   Ht 162.6 cm (64\")   Wt 88.2 kg (194 lb 6.4 oz)   SpO2 97%   BMI 33.37 kg/m²         Physical Exam   Constitutional: She is oriented to person, place, and time. She appears well-developed and well-nourished.   HENT:   Head: Normocephalic.   Eyes: Pupils are equal, round, and reactive to light.   Neck: Normal range of motion. No JVD present. Carotid bruit is not present. No thyromegaly present.   Cardiovascular: Normal rate, regular rhythm, S1 normal, S2 normal, normal heart sounds and intact distal pulses. Exam reveals no gallop and no friction rub.   No murmur heard.  Pulmonary/Chest: Effort normal and breath sounds " normal.   Abdominal: Soft. Bowel sounds are normal.   Musculoskeletal: She exhibits no edema.   Neurological: She is alert and oriented to person, place, and time.   Skin: Skin is warm, dry and intact. No erythema.   Psychiatric: She has a normal mood and affect.   Vitals reviewed.          Assessment:       Diagnosis Plan   1. Coronary artery disease involving native coronary artery of native heart without angina pectoris     2. Essential hypertension     3. Hyperlipidemia, unspecified hyperlipidemia type     4. Chronic obstructive pulmonary disease, unspecified COPD type (CMS/[facility])     5. Tobacco abuse       1. Coronary Artery Disease  Assessment  • The patient has no angina    Plan  • Lifestyle modifications discussed include adhering to a heart healthy diet, maintenance of a healthy weight, regular exercise and regular monitoring of cholesterol and blood pressure    Subjective - Objective  • There has been a previous stent procedure using OSIRIS LCx, RCA June 2020  • Current antiplatelet therapy includes aspirin 81 mg and clopidogrel 75 mg    2.  Hypertension: Uncontrolled.  Will start on Bystolic  3.  Hyperlipidemia: On statin therapy  4.  Chronic obstructive pulmonary disease  5.  Tobacco abuse.  Recommended at all possible she quit smoking.  She has been a lifelong smoker occur       Plan:

## 2020-08-06 RX ORDER — BISOPROLOL FUMARATE 10 MG/1
10 TABLET, FILM COATED ORAL DAILY
Qty: 90 TABLET | Refills: 3 | Status: SHIPPED | OUTPATIENT
Start: 2020-08-06 | End: 2021-02-18

## 2020-08-17 NOTE — PROGRESS NOTES
"Subjective   History of Present Illness: Flaquita Cr is a 60 y.o. female is here today for follow-up. She was last seen last in office 9/7/18 for neck arm bilateral back and leg pain. She had lumbar MAO.    She returns to the office with increased back, bilateral hip and numbness in both feet L>R.  Depending in activity she has burning in feet.  She has not had any recent treatments.  She takes Tylenol 1300 BID, Baclofen 5 mg in morning and baclofen 15 mg HS for pain.     Doppler ordered by ISAAK Zhao completed 7/24/20.     History of Present Illness  She says today she can walk about 200 steps then has to sit down due to back and bilateral hip pain. She also gets a sharp pain in the left great toe and dorsum of the foot. She is also starting to get a tingling sensation in her right foot.   She has had recent cardiac stents and has had vascular workup.   After she was last seen in 2018 she had lumbar epidurals but says she did not notice much difference in the pattern of pain after three injections and since that time the pain is worse.   She has also been treated by a chiropractor for the pattern of back pain.    The following portions of the patient's history were reviewed and updated as appropriate: allergies, current medications, past family history, past medical history, past social history, past surgical history and problem list.    Review of Systems   Musculoskeletal: Positive for back pain (leg pain ).   Neurological: Positive for weakness (In back ) and numbness (both feet).   All other systems reviewed and are negative.      Objective     ./72   Pulse 84   Temp 98.2 °F (36.8 °C)   Ht 162.6 cm (64\")   Wt 87.4 kg (192 lb 9.6 oz)   BMI 33.06 kg/m²    Body mass index is 33.06 kg/m².      Physical Exam   Constitutional: She is oriented to person, place, and time. She appears well-developed and well-nourished.   HENT:   Head: Normocephalic.   Eyes: Pupils are equal, round, and reactive to " light. EOM are normal.   Neck: Normal range of motion.   Cardiovascular: Normal rate.   Pulmonary/Chest: Effort normal.   Abdominal: She exhibits no distension.   Musculoskeletal: Normal range of motion.   Neurological: She is alert and oriented to person, place, and time. She has normal strength.   Reflex Scores:       Patellar reflexes are 0 on the right side and 0 on the left side.       Achilles reflexes are 0 on the right side and 0 on the left side.  Skin: Skin is warm and dry.   Psychiatric: She has a normal mood and affect. Her speech is normal.   Vitals reviewed.    Neurologic Exam     Mental Status   Oriented to person, place, and time.   Speech: speech is normal   Level of consciousness: alert    Cranial Nerves     CN III, IV, VI   Pupils are equal, round, and reactive to light.  Extraocular motions are normal.     Motor Exam   Muscle bulk: normal  Overall muscle tone: normal    Strength   Strength 5/5 throughout.     Sensory Exam   Light touch normal.     Gait, Coordination, and Reflexes     Gait  Gait: (Lumbago style gait)    Reflexes   Right patellar: 0  Left patellar: 0  Right achilles: 0  Left achilles: 0  Right ankle clonus: absent  Left ankle clonus: absent          Assessment/Plan   Independent Review of Radiographic Studies:      I personally reviewed the images from the following studies.    No recent imaging    Medical Decision Making:      With the persistent back and bilateral hip pain with symptoms in her feet despite epidurals and chiropractic treatment, we will update a lumbar MRI.  We did discuss today that it would be very rare to have foot pain related to lumbar radiculopathy so she understands that if there is nothing of surgical concern on the lumbar MRI we may refer her to a podiatrist in follow-up.  May also have her get an EMG/NCV of the lower extremities to evaluate for peripheral neuropathy as she has already had vascular work-up for the burning sensation in her feet.    Flaquita  was seen today for back pain and leg pain.    Diagnoses and all orders for this visit:    DDD (degenerative disc disease), lumbar  -     MRI Lumbar Spine Without Contrast; Future    Numbness and tingling of foot  -     MRI Lumbar Spine Without Contrast; Future      Return for After radiographic tests.

## 2020-08-18 ENCOUNTER — OFFICE VISIT (OUTPATIENT)
Dept: NEUROSURGERY | Facility: CLINIC | Age: 60
End: 2020-08-18

## 2020-08-18 VITALS
HEART RATE: 84 BPM | WEIGHT: 192.6 LBS | SYSTOLIC BLOOD PRESSURE: 121 MMHG | DIASTOLIC BLOOD PRESSURE: 72 MMHG | TEMPERATURE: 98.2 F | BODY MASS INDEX: 32.88 KG/M2 | HEIGHT: 64 IN

## 2020-08-18 DIAGNOSIS — M51.36 DDD (DEGENERATIVE DISC DISEASE), LUMBAR: Primary | ICD-10-CM

## 2020-08-18 DIAGNOSIS — R20.2 NUMBNESS AND TINGLING OF FOOT: ICD-10-CM

## 2020-08-18 DIAGNOSIS — R20.0 NUMBNESS AND TINGLING OF FOOT: ICD-10-CM

## 2020-08-18 PROCEDURE — 99213 OFFICE O/P EST LOW 20 MIN: CPT | Performed by: NURSE PRACTITIONER

## 2020-08-20 LAB
ALBUMIN SERPL-MCNC: 4.6 G/DL (ref 3.5–5.2)
ALBUMIN/GLOB SERPL: 1.7 G/DL
ALP SERPL-CCNC: 92 U/L (ref 39–117)
ALT SERPL-CCNC: 18 U/L (ref 1–33)
AST SERPL-CCNC: 21 U/L (ref 1–32)
BILIRUB SERPL-MCNC: 0.2 MG/DL (ref 0–1.2)
BUN SERPL-MCNC: 20 MG/DL (ref 8–23)
BUN/CREAT SERPL: 21.1 (ref 7–25)
CALCIUM SERPL-MCNC: 10.1 MG/DL (ref 8.6–10.5)
CHLORIDE SERPL-SCNC: 101 MMOL/L (ref 98–107)
CHOLEST SERPL-MCNC: 279 MG/DL (ref 0–200)
CO2 SERPL-SCNC: 26.1 MMOL/L (ref 22–29)
CREAT SERPL-MCNC: 0.95 MG/DL (ref 0.57–1)
GLOBULIN SER CALC-MCNC: 2.7 GM/DL
GLUCOSE SERPL-MCNC: 97 MG/DL (ref 65–99)
HBA1C MFR BLD: 5.9 % (ref 4.8–5.6)
HDLC SERPL-MCNC: 42 MG/DL (ref 40–60)
LDLC SERPL CALC-MCNC: 182 MG/DL (ref 0–100)
LDLC/HDLC SERPL: 4.33 {RATIO}
POTASSIUM SERPL-SCNC: 4.8 MMOL/L (ref 3.5–5.2)
PROT SERPL-MCNC: 7.3 G/DL (ref 6–8.5)
SODIUM SERPL-SCNC: 139 MMOL/L (ref 136–145)
TRIGL SERPL-MCNC: 276 MG/DL (ref 0–150)
VLDLC SERPL CALC-MCNC: 55.2 MG/DL

## 2020-08-27 ENCOUNTER — OFFICE VISIT (OUTPATIENT)
Dept: INTERNAL MEDICINE | Facility: CLINIC | Age: 60
End: 2020-08-27

## 2020-08-27 VITALS
DIASTOLIC BLOOD PRESSURE: 86 MMHG | WEIGHT: 192.1 LBS | HEIGHT: 64 IN | BODY MASS INDEX: 32.8 KG/M2 | SYSTOLIC BLOOD PRESSURE: 130 MMHG | HEART RATE: 100 BPM | TEMPERATURE: 98.7 F | OXYGEN SATURATION: 98 %

## 2020-08-27 DIAGNOSIS — Z12.31 VISIT FOR SCREENING MAMMOGRAM: ICD-10-CM

## 2020-08-27 DIAGNOSIS — E78.5 HYPERLIPIDEMIA, UNSPECIFIED HYPERLIPIDEMIA TYPE: ICD-10-CM

## 2020-08-27 DIAGNOSIS — I25.10 CORONARY ARTERY DISEASE INVOLVING NATIVE CORONARY ARTERY OF NATIVE HEART WITHOUT ANGINA PECTORIS: ICD-10-CM

## 2020-08-27 DIAGNOSIS — F17.200 CURRENT EVERY DAY SMOKER: ICD-10-CM

## 2020-08-27 DIAGNOSIS — R73.03 PREDIABETES: Primary | ICD-10-CM

## 2020-08-27 DIAGNOSIS — I15.9 SECONDARY HYPERTENSION: ICD-10-CM

## 2020-08-27 PROCEDURE — 99214 OFFICE O/P EST MOD 30 MIN: CPT | Performed by: NURSE PRACTITIONER

## 2020-08-27 RX ORDER — FLUNISOLIDE 0.25 MG/ML
2 SOLUTION NASAL EVERY 12 HOURS
Qty: 3 BOTTLE | Refills: 2 | Status: SHIPPED | OUTPATIENT
Start: 2020-08-27 | End: 2021-02-18 | Stop reason: SDUPTHER

## 2020-08-27 NOTE — PROGRESS NOTES
Subjective   Flaquita Cr is a 60 y.o. female.      History of Present Illness   The patient is here today to F/U on lab work. She is feeling ok overall.     HPL-Pt is doing well with current medication regimen, denies adverse reactions, compliant with medication schedule.   CAD- with 2 recent stent placements. BP was high at last visit, bystolic started  Currently followed for back pain by neurosurgery. They have ordered MRI lumbar spine.  Current every day smoker- has not started chantix yet   The following portions of the patient's history were reviewed and updated as appropriate: allergies, current medications, past family history, past medical history, past social history, past surgical history and problem list.    Review of Systems   Constitutional: Negative.    HENT: Positive for congestion.    Respiratory: Positive for cough (occ), shortness of breath (with exertion) and wheezing (occ).    Allergic/Immunologic: Positive for environmental allergies.       Objective   Physical Exam   Constitutional: She appears well-developed and well-nourished.   Neck: Normal range of motion. Neck supple. No thyromegaly present.   Cardiovascular: Normal rate, regular rhythm, normal heart sounds and intact distal pulses.   Pulmonary/Chest: Effort normal. She has wheezes in the right upper field.   Coarse lung sounds through out   Lymphadenopathy:     She has no cervical adenopathy.   Skin: Skin is warm and dry.   Psychiatric: She has a normal mood and affect. Her behavior is normal. Judgment and thought content normal.       There were no vitals filed for this visit.  There is no height or weight on file to calculate BMI.      Assessment/Plan   Diagnoses and all orders for this visit:    Prediabetes  -     Comprehensive Metabolic Panel; Future  -     Lipid Panel With LDL / HDL Ratio; Future  -     TSH Rfx On Abnormal To Free T4; Future  -     Hemoglobin A1c; Future    Coronary artery disease involving native coronary  artery of native heart without angina pectoris  -     Comprehensive Metabolic Panel; Future  -     Lipid Panel With LDL / HDL Ratio; Future  -     TSH Rfx On Abnormal To Free T4; Future  -     Hemoglobin A1c; Future    Current every day smoker  -     Comprehensive Metabolic Panel; Future  -     Lipid Panel With LDL / HDL Ratio; Future  -     TSH Rfx On Abnormal To Free T4; Future  -     Hemoglobin A1c; Future    Hyperlipidemia, unspecified hyperlipidemia type  -     Comprehensive Metabolic Panel; Future  -     Lipid Panel With LDL / HDL Ratio; Future  -     TSH Rfx On Abnormal To Free T4; Future  -     Hemoglobin A1c; Future    Secondary hypertension  -     Comprehensive Metabolic Panel; Future  -     Lipid Panel With LDL / HDL Ratio; Future  -     TSH Rfx On Abnormal To Free T4; Future  -     Hemoglobin A1c; Future    Visit for screening mammogram  -     Mammo Screening Bilateral With CAD    Other orders  -     flunisolide (NASALIDE) 25 MCG/ACT (0.025%) solution nasal spray; Inhale 2 sprays Every 12 (Twelve) Hours.               1. Prediabetes- improved  2. CAD- has not started cardiac rehab yet.   3. Current every day smoker- she will start chantix, she is ready to quit  4. HPL- pt defers further med increase, discussed mediterranean style diet and exercise, discussed repatha she will look in to this.   5. HTN- well controlled    C-scope- she will call about this

## 2020-08-27 NOTE — PATIENT INSTRUCTIONS
Mediterranean Diet  A Mediterranean diet refers to food and lifestyle choices that are based on the traditions of countries located on the [place]. This way of eating has been shown to help prevent certain conditions and improve outcomes for people who have chronic diseases, like kidney disease and heart disease.  What are tips for following this plan?  Lifestyle  · Cook and eat meals together with your family, when possible.  · Drink enough fluid to keep your urine clear or pale yellow.  · Be physically active every day. This includes:  ? Aerobic exercise like running or swimming.  ? Leisure activities like gardening, walking, or housework.  · Get 7-8 hours of sleep each night.  · If recommended by your health care provider, drink red wine in moderation. This means 1 glass a day for nonpregnant women and 2 glasses a day for men. A glass of wine equals 5 oz (150 mL).  Reading food labels    · Check the serving size of packaged foods. For foods such as rice and pasta, the serving size refers to the amount of cooked product, not dry.  · Check the total fat in packaged foods. Avoid foods that have saturated fat or trans fats.  · Check the ingredients list for added sugars, such as corn syrup.  Shopping  · At the grocery store, buy most of your food from the areas near the walls of the store. This includes:  ? Fresh fruits and vegetables (produce).  ? Grains, beans, nuts, and seeds. Some of these may be available in unpackaged forms or large amounts (in bulk).  ? Fresh seafood.  ? Poultry and eggs.  ? Low-fat dairy products.  · Buy whole ingredients instead of prepackaged foods.  · Buy fresh fruits and vegetables in-season from local farmers markets.  · Buy frozen fruits and vegetables in resealable bags.  · If you do not have access to quality fresh seafood, buy precooked frozen shrimp or canned fish, such as tuna, salmon, or sardines.  · Buy small amounts of raw or cooked vegetables, salads, or olives from  the deli or salad bar at your store.  · Stock your pantry so you always have certain foods on hand, such as olive oil, canned tuna, canned tomatoes, rice, pasta, and beans.  Cooking  · Cook foods with extra-virgin olive oil instead of using butter or other vegetable oils.  · Have meat as a side dish, and have vegetables or grains as your main dish. This means having meat in small portions or adding small amounts of meat to foods like pasta or stew.  · Use beans or vegetables instead of meat in common dishes like chili or lasagna.  · [place] with different cooking methods. Try roasting or broiling vegetables instead of steaming or sautéeing them.  · Add frozen vegetables to soups, stews, pasta, or rice.  · Add nuts or seeds for added healthy fat at each meal. You can add these to yogurt, salads, or vegetable dishes.  · Marinate fish or vegetables using olive oil, lemon juice, garlic, and fresh herbs.  Meal planning    · Plan to eat 1 vegetarian meal one day each week. Try to work up to 2 vegetarian meals, if possible.  · Eat seafood 2 or more times a week.  · Have healthy snacks readily available, such as:  ? Vegetable sticks with hummus.  ? Greek yogurt.  ? Fruit and nut trail mix.  · Eat balanced meals throughout the week. This includes:  ? Fruit: 2-3 servings a day  ? Vegetables: 4-5 servings a day  ? Low-fat dairy: 2 servings a day  ? Fish, poultry, or lean meat: 1 serving a day  ? Beans and legumes: 2 or more servings a week  ? Nuts and seeds: 1-2 servings a day  ? Whole grains: 6-8 servings a day  ? Extra-virgin olive oil: 3-4 servings a day  · Limit red meat and sweets to only a few servings a month  What are my food choices?  · Mediterranean diet  ? Recommended  § Grains: Whole-grain pasta. Brown rice. Bulgar wheat. Polenta. Couscous. Whole-wheat bread. Oatmeal. Quinoa.  § Vegetables: Artichokes. Beets. Broccoli. Cabbage. Carrots. Eggplant. Green beans. Chard. Kale. Spinach. Onions. Leeks. Peas. Squash.  Tomatoes. Peppers. Radishes.  § Fruits: Apples. Apricots. Avocado. Berries. Bananas. Cherries. Dates. Figs. Grapes. Nely. Melon. Oranges. Peaches. Plums. Pomegranate.  § Meats and other protein foods: Beans. Almonds. Sunflower seeds. Pine nuts. Peanuts. Cod. Sweetser. Scallops. Shrimp. Tuna. Tilapia. Clams. Oysters. Eggs.  § Dairy: Low-fat milk. Cheese. Greek yogurt.  § Beverages: Water. Red wine. Herbal tea.  § Fats and oils: Extra virgin olive oil. Avocado oil. Grape seed oil.  § Sweets and desserts: Greek yogurt with honey. Baked apples. Poached pears. Trail mix.  § Seasoning and other foods: Basil. Cilantro. Coriander. Cumin. Mint. Parsley. Tor. Rosemary. Tarragon. Garlic. Oregano. Thyme. Pepper. Balsalmic vinegar. Tahini. Hummus. Tomato sauce. Olives. Mushrooms.  ? Limit these  § Grains: Prepackaged pasta or rice dishes. Prepackaged cereal with added sugar.  § Vegetables: Deep fried potatoes (french fries).  § Fruits: Fruit canned in syrup.  § Meats and other protein foods: Beef. Pork. Lamb. Poultry with skin. Hot dogs. Perez.  § Dairy: Ice cream. Sour cream. Whole milk.  § Beverages: Juice. Sugar-sweetened soft drinks. Beer. Liquor and spirits.  § Fats and oils: Butter. Canola oil. Vegetable oil. Beef fat (tallow). Lard.  § Sweets and desserts: Cookies. Cakes. Pies. Candy.  § Seasoning and other foods: Mayonnaise. Premade sauces and marinades.  The items listed may not be a complete list. Talk with your dietitian about what dietary choices are right for you.  Summary  · The Mediterranean diet includes both food and lifestyle choices.  · Eat a variety of fresh fruits and vegetables, beans, nuts, seeds, and whole grains.  · Limit the amount of red meat and sweets that you eat.  · Talk with your health care provider about whether it is safe for you to drink red wine in moderation. This means 1 glass a day for nonpregnant women and 2 glasses a day for men. A glass of wine equals 5 oz (150 mL).  This information  is not intended to replace advice given to you by your health care provider. Make sure you discuss any questions you have with your health care provider.  Document Released: 08/10/2017 Document Revised: 08/17/2017 Document Reviewed: 08/10/2017  Elsevier Patient Education © 2020 [employer]

## 2020-09-01 RX ORDER — GUAIFENESIN 600 MG/1
TABLET, EXTENDED RELEASE ORAL
Qty: 60 TABLET | Refills: 0 | OUTPATIENT
Start: 2020-09-01

## 2020-09-08 ENCOUNTER — HOSPITAL ENCOUNTER (OUTPATIENT)
Dept: MRI IMAGING | Facility: HOSPITAL | Age: 60
Discharge: HOME | End: 2020-09-08
Admitting: NURSE PRACTITIONER

## 2020-09-08 DIAGNOSIS — M51.36 DDD (DEGENERATIVE DISC DISEASE), LUMBAR: ICD-10-CM

## 2020-09-08 DIAGNOSIS — R20.0 NUMBNESS AND TINGLING OF FOOT: ICD-10-CM

## 2020-09-08 DIAGNOSIS — R20.2 NUMBNESS AND TINGLING OF FOOT: ICD-10-CM

## 2020-09-08 PROCEDURE — 72148 MRI LUMBAR SPINE W/O DYE: CPT

## 2020-09-21 RX ORDER — OMEPRAZOLE 40 MG/1
CAPSULE, DELAYED RELEASE ORAL
Qty: 180 CAPSULE | Refills: 0 | OUTPATIENT
Start: 2020-09-21

## 2020-09-23 NOTE — PROGRESS NOTES
Subjective   History of Present Illness: Flaquita Cr is a 60 y.o. female is here today for follow-up to discuss lumbar MRI results done at [facility] 9.8.20.  She has tried MAO and chiropractic treatment in the past without success    Patient was last seen 8.18.20 for low back and leg pain    Patient states that she has low back and bilateral hip and left foot pain, she denies leg pain or leg weakness.  She has N/T left foot, no urinary incontinence or problems with her balance and gait    She is taking Baclofen 10 mg hs as prescribed by her PCP    This very nice lady is being seen for an opinion about her chronic back pain.  She does have a fairly significant history of peripheral vascular disease and has undergone an aortobifemoral bypass about 10 years ago.  She has 2 kinds of complaints.  The first one is pain mostly in the back and into the region of the sacroiliac joint without radiation of pain.  She has some burning, numbness and tingling in her feet.  Walking tends to make the back pain worse.  It does not really bother the feet.  When she lies down at night sometimes the feet begin to hurt her more and she gets some relief by hanging her foot over the bed.  She is being evaluated by Kalpesh Ahuja MD, to assess the patency of her bypass graft.  The back pain has been quite debilitating.  She has history of cardiac disease and stents, and she cannot really participate in cardiac rehab because she cannot walk very well.  She has no outright motor deficits.  I went over her lumbar MRI with her.  She has mainly facet disease without significant stenosis.  I told her that her pain probably represents pain from the facets, possibly the sacroiliac joint.  There is certainly nothing from a surgical standpoint to do.  I told her since she has already had epidural blocks in the past that a radiofrequency ablation of either the facet joints or the sacroiliac joints could be helpful, and we will send her to the pain  "doctors for that.  I do not really have much to offer her.  We will keep it open-ended, but if she develops sciatica in the future she can come back to see me.  I told her I thought the foot symptoms were related to her vascular disease and she will work with Dr. Ahuja on that.        Back Pain  The problem occurs constantly. The problem is unchanged. The pain is present in the lumbar spine. Associated symptoms include numbness. Pertinent negatives include no fever or weakness.       The following portions of the patient's history were reviewed and updated as appropriate: allergies, current medications, past family history, past medical history, past social history, past surgical history and problem list.    Review of Systems   Constitutional: Negative for fever.   HENT: Negative.    Eyes: Negative.    Respiratory: Negative.    Cardiovascular: Negative.    Gastrointestinal: Negative.    Endocrine: Negative.    Genitourinary: Negative for urgency.   Musculoskeletal: Positive for back pain. Negative for arthralgias, gait problem, joint swelling and myalgias.   Allergic/Immunologic: Negative.    Neurological: Positive for numbness. Negative for weakness.   Hematological: Negative.    Psychiatric/Behavioral: Negative.            Objective     Vitals:    09/30/20 1020   BP: 129/80   Pulse: 78   Temp: 98.4 °F (36.9 °C)   TempSrc: Temporal   Height: 162.6 cm (64.02\")     Body mass index is 32.96 kg/m².      Physical Exam  Constitutional:       Appearance: She is well-developed.   HENT:      Head: Normocephalic and atraumatic.   Eyes:      Extraocular Movements: EOM normal.      Conjunctiva/sclera: Conjunctivae normal.      Pupils: Pupils are equal, round, and reactive to light.      Funduscopic exam:     Right eye: No papilledema.         Left eye: No papilledema.   Neck:      Vascular: No carotid bruit.   Neurological:      Mental Status: She is oriented to person, place, and time.      Coordination: Finger-Nose-Finger " Test and Heel to Mann Test normal.      Gait: Gait is intact.      Deep Tendon Reflexes:      Reflex Scores:       Tricep reflexes are 2+ on the right side and 2+ on the left side.       Bicep reflexes are 2+ on the right side and 2+ on the left side.       Brachioradialis reflexes are 2+ on the right side and 2+ on the left side.       Patellar reflexes are 2+ on the right side and 2+ on the left side.       Achilles reflexes are 2+ on the right side and 2+ on the left side.  Psychiatric:         Speech: Speech normal.       Neurologic Exam     Mental Status   Oriented to person, place, and time.   Registration of memory: Good recent and remote memory.   Attention: normal. Concentration: normal.   Speech: speech is normal   Level of consciousness: alert  Knowledge: consistent with education.     Cranial Nerves     CN II   Visual fields full to confrontation.   Visual acuity: normal    CN III, IV, VI   Pupils are equal, round, and reactive to light.  Extraocular motions are normal.     CN V   Facial sensation intact.   Right corneal reflex: normal  Left corneal reflex: normal    CN VII   Facial expression full, symmetric.   Right facial weakness: none  Left facial weakness: none    CN VIII   Hearing: intact    CN IX, X   Palate: symmetric    CN XI   Right sternocleidomastoid strength: normal  Left sternocleidomastoid strength: normal    CN XII   Tongue: not atrophic  Tongue deviation: none    Motor Exam   Muscle bulk: normal  Right arm tone: normal  Left arm tone: normal  Right leg tone: normal  Left leg tone: normal    Strength   Strength 5/5 except as noted.     Sensory Exam   Light touch normal.     Gait, Coordination, and Reflexes     Gait  Gait: normal    Coordination   Finger to nose coordination: normal  Heel to shin coordination: normal    Reflexes   Right brachioradialis: 2+  Left brachioradialis: 2+  Right biceps: 2+  Left biceps: 2+  Right triceps: 2+  Left triceps: 2+  Right patellar: 2+  Left patellar:  2+  Right achilles: 2+  Left achilles: 2+  Right : 2+  Left : 2+          Assessment/Plan   Independent Review of Radiographic Studies:      I personally reviewed the images from the following studies.    I reviewed the lumbar MRI done on 9/8/2020 which shows mild facet overgrowth at L4-L5 and L5-S1 with a small annular tear at L5-S1.  There is no significant spinal stenosis or disc herniation.  Agree with the report.        Medical Decision Making:      Nothing from a surgical standpoint.  I think the back pain may be helped by a radiofrequency ablation of the lumbar facets and possibly the SI joints.  We will send her to Dr. Pepper for that.  We will keep it open-ended.  If she starts developing radiating sciatica then she can certainly come back to see us again.      Flaquita was seen today for back pain, hip pain, foot pain and numbness.    Diagnoses and all orders for this visit:    DDD (degenerative disc disease), lumbar  -     Ambulatory Referral to Pain Management    Facet syndrome, lumbar  -     Ambulatory Referral to Pain Management      Return if symptoms worsen or fail to improve.

## 2020-09-25 ENCOUNTER — HOSPITAL ENCOUNTER (OUTPATIENT)
Dept: MAMMOGRAPHY | Facility: HOSPITAL | Age: 60
End: 2020-09-25

## 2020-09-25 RX ORDER — GUAIFENESIN 600 MG/1
TABLET, EXTENDED RELEASE ORAL
Qty: 60 TABLET | Refills: 0 | OUTPATIENT
Start: 2020-09-25

## 2020-09-28 ENCOUNTER — TRANSCRIBE ORDERS (OUTPATIENT)
Dept: ADMINISTRATIVE | Facility: HOSPITAL | Age: 60
End: 2020-09-28

## 2020-09-28 DIAGNOSIS — I71.40 ABDOMINAL AORTIC ANEURYSM (AAA) WITHOUT RUPTURE (HCC): Primary | ICD-10-CM

## 2020-09-29 RX ORDER — MONTELUKAST SODIUM 10 MG/1
TABLET ORAL
Qty: 90 TABLET | Refills: 0 | Status: SHIPPED | OUTPATIENT
Start: 2020-09-29 | End: 2021-02-18 | Stop reason: SDUPTHER

## 2020-09-29 RX ORDER — GUAIFENESIN 600 MG/1
TABLET, EXTENDED RELEASE ORAL
Qty: 60 TABLET | Refills: 0 | OUTPATIENT
Start: 2020-09-29

## 2020-09-30 ENCOUNTER — OFFICE VISIT (OUTPATIENT)
Dept: NEUROSURGERY | Facility: CLINIC | Age: 60
End: 2020-09-30

## 2020-09-30 VITALS
HEIGHT: 64 IN | HEART RATE: 78 BPM | TEMPERATURE: 98.4 F | SYSTOLIC BLOOD PRESSURE: 129 MMHG | DIASTOLIC BLOOD PRESSURE: 80 MMHG | BODY MASS INDEX: 32.96 KG/M2

## 2020-09-30 DIAGNOSIS — M51.36 DDD (DEGENERATIVE DISC DISEASE), LUMBAR: Primary | ICD-10-CM

## 2020-09-30 DIAGNOSIS — M47.816 FACET SYNDROME, LUMBAR: ICD-10-CM

## 2020-09-30 PROCEDURE — 99214 OFFICE O/P EST MOD 30 MIN: CPT | Performed by: NEUROLOGICAL SURGERY

## 2020-10-08 ENCOUNTER — HOSPITAL ENCOUNTER (OUTPATIENT)
Dept: CT IMAGING | Facility: HOSPITAL | Age: 60
Discharge: HOME | End: 2020-10-08
Admitting: SURGERY

## 2020-10-08 DIAGNOSIS — I71.40 ABDOMINAL AORTIC ANEURYSM (AAA) WITHOUT RUPTURE (HCC): ICD-10-CM

## 2020-10-08 LAB — CREAT BLDA-MCNC: 1.2 MG/DL (ref 0.6–1.3)

## 2020-10-08 PROCEDURE — 75635 CT ANGIO ABDOMINAL ARTERIES: CPT

## 2020-10-08 PROCEDURE — 0 IOPAMIDOL PER 1 ML: Performed by: SURGERY

## 2020-10-08 PROCEDURE — 82565 ASSAY OF CREATININE: CPT

## 2020-10-08 RX ADMIN — IOPAMIDOL 95 ML: 755 INJECTION, SOLUTION INTRAVENOUS at 13:58

## 2020-10-12 RX ORDER — ATORVASTATIN CALCIUM 40 MG/1
40 TABLET, FILM COATED ORAL DAILY
Qty: 30 TABLET | Refills: 11 | Status: SHIPPED | OUTPATIENT
Start: 2020-10-12 | End: 2020-12-17 | Stop reason: SDUPTHER

## 2020-10-13 NOTE — TELEPHONE ENCOUNTER
----- Message from Aster Estrada MD sent at 12/27/2018 12:08 PM EST -----  Regarding: RE: meds refill  Contact: [phone]  OK zina increase to 40 mg on the prozac #30 with 1 refill needs fu with awilda  ----- Message -----  From: Coleen Cruz  Sent: 12/27/2018  11:39 AM  To: Aster Estrada MD  Subject: FW: meds refill                                  PT CURRENTLY TAKING 20 MG PROZAC.  ----- Message -----  From: Courtney Bo  Sent: 12/26/2018  12:55 PM  To: Coleen Cruz  Subject: meds refill                                      Patient needs a refill on her prozac and her celebrex sent in for 30 days to mayur she wants to increase the dose of the prozac.       normal...

## 2020-10-19 RX ORDER — CELECOXIB 200 MG/1
CAPSULE ORAL
Qty: 180 CAPSULE | Refills: 0 | Status: SHIPPED | OUTPATIENT
Start: 2020-10-19 | End: 2021-02-18 | Stop reason: SDUPTHER

## 2020-11-02 RX ORDER — GUAIFENESIN 600 MG/1
TABLET, EXTENDED RELEASE ORAL
Qty: 60 TABLET | Refills: 0 | OUTPATIENT
Start: 2020-11-02

## 2020-11-27 ENCOUNTER — RESULTS ENCOUNTER (OUTPATIENT)
Dept: INTERNAL MEDICINE | Facility: CLINIC | Age: 60
End: 2020-11-27

## 2020-11-27 DIAGNOSIS — I25.10 CORONARY ARTERY DISEASE INVOLVING NATIVE CORONARY ARTERY OF NATIVE HEART WITHOUT ANGINA PECTORIS: ICD-10-CM

## 2020-11-27 DIAGNOSIS — R73.03 PREDIABETES: ICD-10-CM

## 2020-11-27 DIAGNOSIS — I15.9 SECONDARY HYPERTENSION: ICD-10-CM

## 2020-11-27 DIAGNOSIS — F17.200 CURRENT EVERY DAY SMOKER: ICD-10-CM

## 2020-11-27 DIAGNOSIS — E78.5 HYPERLIPIDEMIA, UNSPECIFIED HYPERLIPIDEMIA TYPE: ICD-10-CM

## 2020-12-17 RX ORDER — ATORVASTATIN CALCIUM 40 MG/1
40 TABLET, FILM COATED ORAL DAILY
Qty: 30 TABLET | Refills: 11 | Status: SHIPPED | OUTPATIENT
Start: 2020-12-17 | End: 2021-02-18 | Stop reason: SDUPTHER

## 2020-12-17 RX ORDER — ATORVASTATIN CALCIUM 40 MG/1
40 TABLET, FILM COATED ORAL DAILY
Qty: 90 TABLET | Refills: 3 | Status: SHIPPED | OUTPATIENT
Start: 2020-12-17 | End: 2021-09-20

## 2020-12-17 NOTE — TELEPHONE ENCOUNTER
Last appt 8/2020 missed f/u 12/2020 requesting refill. Pt states she is taking 40 mg atorvastatin it was increased per her vascular Dr.Will have scheduling call her to reschedule.     DA

## 2021-02-16 RX ORDER — MONTELUKAST SODIUM 10 MG/1
TABLET ORAL
Qty: 90 TABLET | Refills: 0 | OUTPATIENT
Start: 2021-02-16

## 2021-02-17 RX ORDER — CELECOXIB 200 MG/1
CAPSULE ORAL
Qty: 180 CAPSULE | Refills: 0 | OUTPATIENT
Start: 2021-02-17

## 2021-02-18 ENCOUNTER — TELEMEDICINE (OUTPATIENT)
Dept: INTERNAL MEDICINE | Facility: CLINIC | Age: 61
End: 2021-02-18

## 2021-02-18 DIAGNOSIS — I10 ESSENTIAL HYPERTENSION: Primary | ICD-10-CM

## 2021-02-18 DIAGNOSIS — I25.10 CORONARY ARTERY DISEASE INVOLVING NATIVE CORONARY ARTERY OF NATIVE HEART WITHOUT ANGINA PECTORIS: ICD-10-CM

## 2021-02-18 DIAGNOSIS — M79.672 BILATERAL FOOT PAIN: ICD-10-CM

## 2021-02-18 DIAGNOSIS — F17.200 CURRENT EVERY DAY SMOKER: ICD-10-CM

## 2021-02-18 DIAGNOSIS — M79.671 BILATERAL FOOT PAIN: ICD-10-CM

## 2021-02-18 DIAGNOSIS — E78.2 MIXED HYPERLIPIDEMIA: ICD-10-CM

## 2021-02-18 PROCEDURE — 99214 OFFICE O/P EST MOD 30 MIN: CPT | Performed by: NURSE PRACTITIONER

## 2021-02-18 RX ORDER — MONTELUKAST SODIUM 10 MG/1
10 TABLET ORAL
Qty: 90 TABLET | Refills: 1 | Status: SHIPPED | OUTPATIENT
Start: 2021-02-18 | End: 2021-08-12

## 2021-02-18 RX ORDER — BISMUTH SUBSALICYLATE 262 MG/1
524 TABLET, CHEWABLE ORAL
COMMUNITY
End: 2021-09-20

## 2021-02-18 RX ORDER — CELECOXIB 200 MG/1
200 CAPSULE ORAL 2 TIMES DAILY
Qty: 180 CAPSULE | Refills: 1 | Status: SHIPPED | OUTPATIENT
Start: 2021-02-18 | End: 2021-09-29 | Stop reason: SDUPTHER

## 2021-02-18 NOTE — PROGRESS NOTES
"Chief Complaint  Hypertension    Subjective          Flaquita Cr presents to [facility]  History of Present Illness  You have chosen to receive care through a telehealth visit.  Do you consent to use a video/audio connection for your medical care today? Yes    She is here today as a new patient to me for a telehealth visit using Doximity for f/u and lab work. She has not had her lab work completed. She is feeling well today. She has been trying to change her diet, eating a more whole food-style diet. She is struggling with exercise secondary to her chronic bilateral foot pain. She has seen neurology and vascular regarding this without a clear diagnosis or cause of the pain.     CAD- 2 stents placed last year. At last cardiology visit started bystolic for HTN. Currently taking plavix and ASA. She never completed cardiac rehab last year. Denies any chest pain, palpitations, SOA, LE edema,   HTN- she stopped the bystolic secondary to \"feeling worse.\" BP running 120s/70s.   HPL- she has stopped taking her atorvastatin 40 mg a few months ago. Last lipid profile in August with LDL of 182. She does not like taking cholesterol medication.    Current every day smoker- she had previously stopped smoking for 3 months, but increased her food intake causing weight gain. She is currently smoking < 1PPD. She tried chantix last year, could only take 1 pill a day. She is planning on quitting once her exercise bike is delivered.   GERD/IBS- stopped protonix secondary to abdominal pain. Using pepto PRN with symptom improvement.      Review of Systems   Constitutional: Positive for fatigue. Negative for chills and fever.   HENT: Positive for congestion, postnasal drip and sinus pressure.    Eyes: Positive for visual disturbance (she is due to see the eye doctor).   Respiratory: Negative.    Cardiovascular: Negative.    Musculoskeletal: Positive for arthralgias and back pain.   Neurological: " Negative.      Objective   Vital Signs:   There were no vitals taken for this visit.    Physical Exam  Constitutional:       General: She is awake.      Appearance: Normal appearance.   Pulmonary:      Effort: Pulmonary effort is normal.   Neurological:      General: No focal deficit present.      Mental Status: She is alert and oriented to person, place, and time. Mental status is at baseline.   Psychiatric:         Attention and Perception: Attention and perception normal.         Mood and Affect: Mood and affect normal.         Speech: Speech normal.         Behavior: Behavior normal. Behavior is cooperative.         Thought Content: Thought content normal.         Cognition and Memory: Cognition and memory normal.         Judgment: Judgment normal.        Result Review :                 Assessment and Plan    Diagnoses and all orders for this visit:    1. Essential hypertension (Primary)    2. Mixed hyperlipidemia    3. Coronary artery disease involving native coronary artery of native heart without angina pectoris    4. Bilateral foot pain  -     Ambulatory Referral to Podiatry    5. Current every day smoker    Other orders  -     celecoxib (CeleBREX) 200 MG capsule; Take 1 capsule by mouth 2 (Two) Times a Day.  Dispense: 180 capsule; Refill: 1  -     montelukast (SINGULAIR) 10 MG tablet; Take 1 tablet by mouth every night at bedtime.  Dispense: 90 tablet; Refill: 1         1. HTN- well controlled off bystolic. Recommend that she continue to check her BP at home, call if running >130/80. I recommend that she notify cardiology of stopping this medication.  2. HPL- not controlled. Need to check lipid profile. She has stopped the atorvastatin secondary to not liking statin medications. Last LDL not controlled at 182. Recommend that she restart the statin, and the importance of the medication in prevention of secondary cardiovascular events. She defers medication at this time. I discussed with her the  cardiovascular risks associated with stopping her statin and that she needs to f/u with cardiology. She is aware and all questions answered.   3. CAD- currently on dual antiplatelet therapy. Denies any bleeding. She did not complete cardiac rehab. Recommend that she f/u with cardiology before starting her exercise program to ensure she is medically cleared for this. Encouraged her to quit smoking.  4. Current every day smoker- encouraged her to quit. She is ready to do so. She has Chantix at home.  5. Bilateral chronic foot pain- referral placed to podiatry for further evaluation.    Fasting labs ordered. She will come next week for this.    Doximity visit lasting 25 minutes       Follow Up   Return in about 3 months (around 5/18/2021), or if symptoms worsen or fail to improve.  Patient was given instructions and counseling regarding her condition or for health maintenance advice. Please see specific information pulled into the AVS if appropriate.

## 2021-03-19 ENCOUNTER — BULK ORDERING (OUTPATIENT)
Dept: CASE MANAGEMENT | Facility: OTHER | Age: 61
End: 2021-03-19

## 2021-03-19 DIAGNOSIS — Z23 IMMUNIZATION DUE: ICD-10-CM

## 2021-05-13 RX ORDER — ALBUTEROL SULFATE 90 UG/1
2 AEROSOL, METERED RESPIRATORY (INHALATION) EVERY 4 HOURS PRN
Qty: 8.5 G | Refills: 3 | Status: SHIPPED | OUTPATIENT
Start: 2021-05-13 | End: 2021-05-18 | Stop reason: SDUPTHER

## 2021-05-18 RX ORDER — ALBUTEROL SULFATE 90 UG/1
2 AEROSOL, METERED RESPIRATORY (INHALATION) EVERY 4 HOURS PRN
Qty: 8.5 G | Refills: 3 | Status: SHIPPED | OUTPATIENT
Start: 2021-05-18

## 2021-05-24 ENCOUNTER — TRANSCRIBE ORDERS (OUTPATIENT)
Dept: ADMINISTRATIVE | Facility: HOSPITAL | Age: 61
End: 2021-05-24

## 2021-05-24 DIAGNOSIS — I65.23 CAROTID STENOSIS, ASYMPTOMATIC, BILATERAL: Primary | ICD-10-CM

## 2021-06-09 RX ORDER — BACLOFEN 10 MG/1
10 TABLET ORAL 3 TIMES DAILY
Qty: 90 TABLET | Refills: 2 | Status: SHIPPED | OUTPATIENT
Start: 2021-06-09 | End: 2021-09-20

## 2021-06-09 NOTE — TELEPHONE ENCOUNTER
Caller: ShayeFlaquita    Relationship: Self    Best call back number: [phone] (H)    Medication needed:   Requested Prescriptions     Pending Prescriptions Disp Refills   • baclofen (LIORESAL) 10 MG tablet 90 tablet 3     Sig: Take 1 tablet by mouth 3 (Three) Times a Day.       When do you need the refill by: 06/11/21    What additional details did the patient provide when requesting the medication: PATIENT CALLED TO REQUEST A MEDICATION REFILL ON HER MEDICATION. PATIENT STATES THAT SHE HAS A 3 DAY SUPPLY LEFT. PATIENT STATES THAT SHE HAS BEEN WITH HER MOTHER WHO HAS PASSED AWAY LAST WEEK. PATIENT IS WANTING TO KNOW IF TONA FORDE COULD PRESCRIBE SOMETHING FOR HER NERVES.     PLEASE CONTACT TO ADVISE.      Does the patient have less than a 3 day supply:  [] Yes  [] No    What is the patient's preferred pharmacy:    [facility]/pharmacy #[id number] [place], [place] - [address]  [phone] [facility] [phone] FX      THANKS

## 2021-06-11 ENCOUNTER — TELEPHONE (OUTPATIENT)
Dept: INTERNAL MEDICINE | Facility: CLINIC | Age: 61
End: 2021-06-11

## 2021-06-11 DIAGNOSIS — F41.9 ANXIETY: Primary | ICD-10-CM

## 2021-06-11 RX ORDER — CLONAZEPAM 0.5 MG/1
0.5 TABLET ORAL 2 TIMES DAILY PRN
Qty: 20 TABLET | Refills: 0 | Status: SHIPPED | OUTPATIENT
Start: 2021-06-11 | End: 2021-09-20

## 2021-06-11 NOTE — TELEPHONE ENCOUNTER
Caller: ShahriarorisFlaquita    Relationship: Self    Best call back number: [phone]    What medication are you requesting: SOMETHING TO CALM NERVES.  PATIENTS MOTHER JUST PASSED AWAY.    What are your current symptoms: GRIEF    How long have you been experiencing symptoms: COUPLE OF WEEKS    Have you had these symptoms before:    [x] Yes  [] No    Have you been treated for these symptoms before:   [x] Yes  [] No    If a prescription is needed, what is your preferred pharmacy and phone number: [facility]/PHARMACY #[id number] - [place], [place] - [address] - [phone] [facility] [phone] FX     Additional notes: PATIENT BELIEVES THAT SHE HAD BEEN GIVEN COLOIPIN BY ANOTHER DOCTOR IN THE PAST.    PLEASE ADVISE PATIENT EITHER WAY.

## 2021-06-21 RX ORDER — CLOPIDOGREL BISULFATE 75 MG/1
TABLET ORAL
Qty: 90 TABLET | Refills: 3 | Status: SHIPPED | OUTPATIENT
Start: 2021-06-21 | End: 2022-07-20

## 2021-06-23 ENCOUNTER — HOSPITAL ENCOUNTER (OUTPATIENT)
Dept: RADIOLOGY | Facility: HOSPITAL | Age: 61
Discharge: HOME | End: 2021-06-23

## 2021-06-23 ENCOUNTER — HOSPITAL ENCOUNTER (OUTPATIENT)
Dept: CT IMAGING | Facility: HOSPITAL | Age: 61
Discharge: HOME | End: 2021-06-23

## 2021-06-23 DIAGNOSIS — I65.23 CAROTID STENOSIS, ASYMPTOMATIC, BILATERAL: ICD-10-CM

## 2021-06-23 LAB — CREAT BLDA-MCNC: 1.1 MG/DL (ref 0.6–1.3)

## 2021-06-23 PROCEDURE — 70496 CT ANGIOGRAPHY HEAD: CPT

## 2021-06-23 PROCEDURE — 82565 ASSAY OF CREATININE: CPT

## 2021-06-23 PROCEDURE — 96360 HYDRATION IV INFUSION INIT: CPT

## 2021-06-23 PROCEDURE — 0 IOPAMIDOL PER 1 ML: Performed by: SURGERY

## 2021-06-23 PROCEDURE — 96361 HYDRATE IV INFUSION ADD-ON: CPT

## 2021-06-23 PROCEDURE — 70498 CT ANGIOGRAPHY NECK: CPT

## 2021-06-23 RX ORDER — SODIUM CHLORIDE 9 MG/ML
500 INJECTION, SOLUTION INTRAVENOUS CONTINUOUS
Status: ACTIVE | OUTPATIENT
Start: 2021-06-23 | End: 2021-06-23

## 2021-06-23 RX ORDER — FLUNISOLIDE 0.25 MG/ML
SOLUTION NASAL
COMMUNITY
Start: 2021-04-07

## 2021-06-23 RX ORDER — SODIUM CHLORIDE 9 MG/ML
100 INJECTION, SOLUTION INTRAVENOUS CONTINUOUS
Status: ACTIVE | OUTPATIENT
Start: 2021-06-23 | End: 2021-06-23

## 2021-06-23 RX ADMIN — IOPAMIDOL 95 ML: 755 INJECTION, SOLUTION INTRAVENOUS at 12:54

## 2021-06-23 RX ADMIN — SODIUM CHLORIDE 500 ML/HR: 9 INJECTION, SOLUTION INTRAVENOUS at 11:29

## 2021-06-23 NOTE — NURSING NOTE
6 month labs reviewed.     CBC is stable.   BMP reviewed.   Creatinine is improved. K is good.   Protein and albumin are low.     Chol 122  Trig 124  HDL 49  LDL73    No changes from our end. Thank you.    Patient arrived in xray triage for pre/ post fluids for CTA. Protective goggles and mask in place with all patient interactions today.

## 2021-06-23 NOTE — NURSING NOTE
Patient refused full 4 hours of fluids after CT scan. Patient received 250 ml of the 400 ml ordered after her CTA today. Patient states she does not want to wait another 1.5 hours for the fluids to finish. Educated patient on importance of the hydration for her kidneys and to continue to drink lots of fluids today. Patient verbalized understanding and walked out to car independently.

## 2021-08-12 RX ORDER — MONTELUKAST SODIUM 10 MG/1
TABLET ORAL
Qty: 90 TABLET | Refills: 1 | Status: SHIPPED | OUTPATIENT
Start: 2021-08-12 | End: 2022-03-30

## 2021-08-12 RX ORDER — MULTIVIT-MIN/FOLIC/VIT K/LYCOP 400-20-370
TABLET ORAL
Qty: 30 CAPSULE | Refills: 1 | Status: SHIPPED | OUTPATIENT
Start: 2021-08-12

## 2021-08-16 RX ORDER — CELECOXIB 200 MG/1
CAPSULE ORAL
Qty: 180 CAPSULE | Refills: 1 | OUTPATIENT
Start: 2021-08-16

## 2021-09-07 RX ORDER — BACLOFEN 10 MG/1
TABLET ORAL
Qty: 270 TABLET | OUTPATIENT
Start: 2021-09-07

## 2021-09-20 ENCOUNTER — OFFICE VISIT (OUTPATIENT)
Dept: FAMILY MEDICINE CLINIC | Facility: CLINIC | Age: 61
End: 2021-09-20

## 2021-09-20 VITALS
HEART RATE: 92 BPM | SYSTOLIC BLOOD PRESSURE: 128 MMHG | HEIGHT: 64 IN | WEIGHT: 198.4 LBS | OXYGEN SATURATION: 97 % | TEMPERATURE: 98.6 F | BODY MASS INDEX: 33.87 KG/M2 | DIASTOLIC BLOOD PRESSURE: 82 MMHG

## 2021-09-20 DIAGNOSIS — Z12.31 SCREENING MAMMOGRAM, ENCOUNTER FOR: ICD-10-CM

## 2021-09-20 DIAGNOSIS — I10 ESSENTIAL HYPERTENSION: ICD-10-CM

## 2021-09-20 DIAGNOSIS — E55.9 VITAMIN D DEFICIENCY: ICD-10-CM

## 2021-09-20 DIAGNOSIS — Z12.11 COLON CANCER SCREENING: ICD-10-CM

## 2021-09-20 DIAGNOSIS — E78.2 MIXED HYPERLIPIDEMIA: ICD-10-CM

## 2021-09-20 DIAGNOSIS — G57.93 NEUROPATHY OF BOTH FEET: Primary | ICD-10-CM

## 2021-09-20 DIAGNOSIS — R73.03 PREDIABETES: ICD-10-CM

## 2021-09-20 PROCEDURE — 99214 OFFICE O/P EST MOD 30 MIN: CPT | Performed by: INTERNAL MEDICINE

## 2021-09-20 RX ORDER — ORPHENADRINE CITRATE 100 MG/1
100 TABLET, EXTENDED RELEASE ORAL 2 TIMES DAILY
COMMUNITY
End: 2022-02-14 | Stop reason: SDUPTHER

## 2021-09-20 RX ORDER — TIZANIDINE 2 MG/1
2 TABLET ORAL NIGHTLY PRN
Qty: 60 TABLET | Refills: 3 | Status: SHIPPED | OUTPATIENT
Start: 2021-09-20 | End: 2021-10-22 | Stop reason: SDUPTHER

## 2021-09-20 RX ORDER — OXYCODONE HYDROCHLORIDE AND ACETAMINOPHEN 5; 325 MG/1; MG/1
1 TABLET ORAL EVERY 6 HOURS PRN
COMMUNITY
End: 2022-07-18

## 2021-09-20 NOTE — PROGRESS NOTES
"Kye Cr is a 61 y.o. female.     Chief Complaint   Patient presents with   • Lower Extremity Issue       History of Present Illness   Complains of chronic persistent pain in the feet.  Was evaluated by vascular with good flow, neurosurgery not related to the back, podiatrist with [facility] and given gabapentin that makes her \"loopy\".  Using tylenol with no relief.  Tried celebrex high dose but not really helping and has stopped.  Tried cymbalta, meloxicam, naproxen in past with no relief.  Tramadol helps at 100 mg but causes severe itching, was on baclofen but was feeling it was keeping her awake and changed to orphenadrine at night.  Flexeril makes to sleepy.  Has been using old oxycodone from 2015 as needed only from 2015.  Feeling discouraged and feels like giving up.  Denies depression.  Has a chronic back pain but resolves with laying down for about an hour.    The following portions of the patient's history were reviewed and updated as appropriate: allergies, current medications, past family history, past medical history, past social history, past surgical history and problem list.    Depression Screen:  No flowsheet data found.    Past Medical History:   Diagnosis Date   • Allergy     Allergies   • Arthritis     failed naproxena dn meloxicam - celebrex works well   • Asthma    • Chest pain    • Circulation problem    • Colon polyp    • COPD (chronic obstructive pulmonary disease) (CMS/[facility])     does not use O2   • Depression    • Emphysema of lung (CMS/[facility])    • Fatigue    • Gastritis    • GERD (gastroesophageal reflux disease)    • Hyperlipidemia    • Hypertension     just started, no meds   • IBS (irritable bowel syndrome)    • Low back pain    • OAB (overactive bladder)    • Ovarian cyst 1989   • PAD (peripheral artery disease) (CMS/[facility])    • Peripheral arterial disease (CMS/[facility])    • Right leg pain    • Sleep apnea    • Tobacco abuse        Past Surgical History:   Procedure " Laterality Date   • AORTA FEMORAL BYPASS  2011   • CARDIAC CATHETERIZATION N/A 6/26/2020    Procedure: Stent OSIRIS coronary;  Surgeon: David Higgins MD;  Location:  [facility];  Service: Cardiology;  Laterality: N/A;   • CARDIAC CATHETERIZATION N/A 6/26/2020    Procedure: Coronary angiography;  Surgeon: David Higgins MD;  Location:  [facility];  Service: Cardiology;  Laterality: N/A;   • CARDIAC CATHETERIZATION N/A 6/26/2020    Procedure: Aortic root aortogram;  Surgeon: David Higgins MD;  Location:  [facility];  Service: Cardiology;  Laterality: N/A;   • CARDIAC CATHETERIZATION N/A 6/26/2020    Procedure: Percutaneous Coronary Intervention;  Surgeon: David Higgins MD;  Location:  [facility];  Service: Cardiology;  Laterality: N/A;   • COLONOSCOPY N/A 8/24/2017    NBIH, diverticulosis, four 5 to 6 mm polyps (one tubular adenoma)   • EPIDURAL BLOCK     • HYSTERECTOMY  1988    endometriosis   • LEG SURGERY Bilateral     for PAD   • ROOT CANAL      R upper jaw with rootcanal       Family History   Problem Relation Age of Onset   • Osteoporosis Mother    • Atrial fibrillation Mother         pacemaker   • Arthritis Mother    • Alzheimer's disease Mother    • Macular degeneration Father    • Cancer Father    • Arthritis Sister    • Gout Brother    • Arthritis Brother    • Colon cancer Maternal Aunt    • Uterine cancer Maternal Aunt    • Breast cancer Neg Hx        Social History     Socioeconomic History   • Marital status:      Spouse name: Not on file   • Number of children: 3   • Years of education: 12   • Highest education level: Not on file   Tobacco Use   • Smoking status: Current Every Day Smoker     Years: 88.00     Types: Electronic Cigarette, Cigarettes     Start date: 1970   • Smokeless tobacco: Never Used   • Tobacco comment: Began smoking at age 10.  Smoked 1 ppd for 15 years, 2 ppd for 18 years, and 2.5  ppd for 15 years for an 88.5 pack year history.  Currently smoking 4 tobacco cigarettes daily with frequent use of an e-cigarette.  Using e-cig since 2010.   Substance and Sexual Activity   • Alcohol use: Yes     Comment: once per year, holidays/ CAFFEINE USE: 2 CUPS COFFEE DAILY   • Drug use: No   • Sexual activity: Defer       Current Outpatient Medications   Medication Sig Dispense Refill   • Acetaminophen (TYLENOL ARTHRITIS PAIN PO) Take 650 mg by mouth 2 (Two) Times a Day As Needed.     • albuterol sulfate HFA (ProAir HFA) 108 (90 Base) MCG/ACT inhaler Inhale 2 puffs Every 4 (Four) Hours As Needed for Wheezing or Shortness of Air. 8.5 g 3   • aspirin 81 MG EC tablet Take 1 tablet by mouth Daily. 90 tablet 3   • azelastine (ASTEPRO) 0.15 % solution nasal spray 1 spray into the nostril(s) as directed by provider Daily.     • celecoxib (CeleBREX) 200 MG capsule Take 1 capsule by mouth 2 (Two) Times a Day. 180 capsule 1   • cetirizine (ZyrTEC Allergy) 10 MG tablet Take 1 tablet by mouth Daily. 180 tablet 1   • Cholecalciferol (VITAMIN D) 50 MCG (2000 UT) tablet Take 2,000 Units by mouth Daily. 30 tablet 5   • clopidogrel (PLAVIX) 75 MG tablet TAKE 1 TABLET BY MOUTH EVERY DAY 90 tablet 3   • [employer] Digestive Probiotic 250 MG capsule TAKE 1 CAPSULE BY MOUTH EVERY DAY 30 capsule 1   • diphenhydrAMINE (BENADRYL) 25 mg capsule Take 25 mg by mouth Every 6 (Six) Hours As Needed for Itching.     • flunisolide (NASALIDE) 25 MCG/ACT (0.025%) solution nasal spray SPRAY 2 SPRAYS EVERY 12 HOURS AS DIRECTED     • fluticasone (FLONASE) 50 MCG/ACT nasal spray 2 sprays into the nostril(s) as directed by provider Daily. 3 bottle 3   • Fluticasone-Umeclidin-Vilant (TRELEGY ELLIPTA) 100-62.5-25 MCG/INH aerosol powder  Inhale 1 puff.     • guaiFENesin (Mucinex) 600 MG 12 hr tablet Take 2 tablets by mouth 2 (Two) Times a Day. 60 tablet 5   • montelukast (SINGULAIR) 10 MG tablet TAKE 1 TABLET BY MOUTH EVERYDAY AT BEDTIME 90 tablet 1   •  "MULTIPLE VITAMIN PO Take 1 tablet by mouth Daily.     • Omega-3 Fatty Acids (OMEGA 3 PO) Take  by mouth Daily. 2 caps daily     • orphenadrine (NORFLEX) 100 MG 12 hr tablet Take 100 mg by mouth 2 (Two) Times a Day.     • oxyCODONE-acetaminophen (PERCOCET) 5-325 MG per tablet Take 1 tablet by mouth Every 6 (Six) Hours As Needed.     • Turmeric (QC TUMERIC COMPLEX PO) Take  by mouth Daily. 2 caps daily     • Probiotic capsule Take 1 capsule by mouth Daily. 30 capsule 5   • tiZANidine (ZANAFLEX) 2 MG tablet Take 1 tablet by mouth At Night As Needed for Muscle Spasms. 60 tablet 3     No current facility-administered medications for this visit.       Review of Systems   Constitutional: Negative for activity change, appetite change, fatigue, fever, unexpected weight gain and unexpected weight loss.   HENT: Negative for nosebleeds, rhinorrhea, trouble swallowing and voice change.    Eyes: Negative for visual disturbance.   Respiratory: Negative for cough, chest tightness, shortness of breath and wheezing.    Cardiovascular: Negative for chest pain, palpitations and leg swelling.   Gastrointestinal: Negative for abdominal pain, blood in stool, constipation, diarrhea, nausea, vomiting, GERD and indigestion.   Genitourinary: Negative for dysuria, frequency and hematuria.   Musculoskeletal: Negative for arthralgias, back pain and myalgias.   Skin: Negative for rash and bruise.   Neurological: Positive for numbness. Negative for dizziness, tremors, weakness, light-headedness, headache and memory problem.        Chronic pain in the feet   Hematological: Negative for adenopathy. Does not bruise/bleed easily.   Psychiatric/Behavioral: Negative for sleep disturbance and depressed mood. The patient is not nervous/anxious.        Objective   /82 (BP Location: Left arm, Patient Position: Sitting, Cuff Size: Adult)   Pulse 92   Temp 98.6 °F (37 °C) (Temporal)   Ht 162.6 cm (64.02\")   Wt 90 kg (198 lb 6.4 oz)   SpO2 97%   " BMI 34.04 kg/m²     Physical Exam  Vitals and nursing note reviewed.   Constitutional:       General: She is not in acute distress.     Appearance: She is well-developed. She is not diaphoretic.   HENT:      Head: Normocephalic and atraumatic.      Right Ear: External ear normal.      Left Ear: External ear normal.      Nose: Nose normal.   Eyes:      Conjunctiva/sclera: Conjunctivae normal.      Pupils: Pupils are equal, round, and reactive to light.   Neck:      Thyroid: No thyromegaly.      Trachea: No tracheal deviation.   Cardiovascular:      Rate and Rhythm: Normal rate and regular rhythm.      Heart sounds: Normal heart sounds. No murmur heard.   No friction rub. No gallop.    Pulmonary:      Effort: Pulmonary effort is normal. No respiratory distress.      Breath sounds: Normal breath sounds.   Abdominal:      General: Bowel sounds are normal.      Palpations: Abdomen is soft. There is no mass.      Tenderness: There is no abdominal tenderness. There is no guarding.   Musculoskeletal:         General: Normal range of motion.      Cervical back: Normal range of motion and neck supple.   Lymphadenopathy:      Cervical: No cervical adenopathy.   Skin:     General: Skin is warm and dry.      Capillary Refill: Capillary refill takes less than 2 seconds.      Findings: No rash.   Neurological:      Mental Status: She is alert and oriented to person, place, and time.      Motor: No abnormal muscle tone.      Deep Tendon Reflexes: Reflexes normal.   Psychiatric:         Behavior: Behavior normal.         Thought Content: Thought content normal.         Judgment: Judgment normal.         No results found for this or any previous visit (from the past 2016 hour(s)).  Assessment/Plan   Diagnoses and all orders for this visit:    1. Neuropathy of both feet (Primary)  -     Vitamin B12 & Folate  -     tiZANidine (ZANAFLEX) 2 MG tablet; Take 1 tablet by mouth At Night As Needed for Muscle Spasms.  Dispense: 60 tablet;  Refill: 3    2. Colon cancer screening  -     Ambulatory Referral For Screening Colonoscopy    3. Mixed hyperlipidemia  -     Lipid Panel  -     Comprehensive Metabolic Panel    4. Essential hypertension  -     Lipid Panel  -     Comprehensive Metabolic Panel    5. Vitamin D deficiency  -     Vitamin D 25 Hydroxy    6. Prediabetes  -     Hemoglobin A1c    7. Screening mammogram, encounter for  -     Mammo Screening Bilateral With CAD; Future      Discussed with patient neuropathy of feet that apparently has been ruled out by spine and vascular to be of their issue.  Has tried multiple medications and either had side effects or did not respond well.  At this time I would recommend stopping the muscle relaxers that she has and we can try tizanidine starting at 2 mg.  Help with pain and also has a muscle relaxer.  I gave warnings of possible side effects including fatigue and sleepiness.  We reviewed all of her history and needs labs.  Labs as noted above hypertension is currently well controlled.  We will check the vitamin D and A1c as noted above is due for her mammogram and her colonoscopy which have also been ordered.         · COVID-19 Precautions - Patient was compliant in wearing a mask. When I saw the patient, I used appropriate personal protective equipment (PPE) including mask and eye shield (standard procedure).  Additionally, I used gown and gloves if indicated.  Hand hygiene was completed before and after seeing the patient.  · Dictated utilizing Dragon Dictation

## 2021-09-21 LAB
25(OH)D3+25(OH)D2 SERPL-MCNC: 36.9 NG/ML (ref 30–100)
ALBUMIN SERPL-MCNC: 4.6 G/DL (ref 3.8–4.8)
ALBUMIN/GLOB SERPL: 1.5 {RATIO} (ref 1.2–2.2)
ALP SERPL-CCNC: 94 IU/L (ref 44–121)
ALT SERPL-CCNC: 19 IU/L (ref 0–32)
AST SERPL-CCNC: 24 IU/L (ref 0–40)
BILIRUB SERPL-MCNC: <0.2 MG/DL (ref 0–1.2)
BUN SERPL-MCNC: 17 MG/DL (ref 8–27)
BUN/CREAT SERPL: 17 (ref 12–28)
CALCIUM SERPL-MCNC: 10.1 MG/DL (ref 8.7–10.3)
CHLORIDE SERPL-SCNC: 101 MMOL/L (ref 96–106)
CHOLEST SERPL-MCNC: 294 MG/DL (ref 100–199)
CO2 SERPL-SCNC: 24 MMOL/L (ref 20–29)
CREAT SERPL-MCNC: 1.02 MG/DL (ref 0.57–1)
FOLATE SERPL-MCNC: 17.3 NG/ML
GLOBULIN SER CALC-MCNC: 3.1 G/DL (ref 1.5–4.5)
GLUCOSE SERPL-MCNC: 86 MG/DL (ref 65–99)
HBA1C MFR BLD: 6.3 % (ref 4.8–5.6)
HDLC SERPL-MCNC: 44 MG/DL
LDLC SERPL CALC-MCNC: 195 MG/DL (ref 0–99)
POTASSIUM SERPL-SCNC: 4.5 MMOL/L (ref 3.5–5.2)
PROT SERPL-MCNC: 7.7 G/DL (ref 6–8.5)
SODIUM SERPL-SCNC: 142 MMOL/L (ref 134–144)
TRIGL SERPL-MCNC: 277 MG/DL (ref 0–149)
VIT B12 SERPL-MCNC: 628 PG/ML (ref 232–1245)
VLDLC SERPL CALC-MCNC: 55 MG/DL (ref 5–40)

## 2021-09-23 ENCOUNTER — TRANSCRIBE ORDERS (OUTPATIENT)
Dept: ADMINISTRATIVE | Facility: HOSPITAL | Age: 61
End: 2021-09-23

## 2021-09-23 DIAGNOSIS — Z12.39 SCREENING BREAST EXAMINATION: Primary | ICD-10-CM

## 2021-09-29 RX ORDER — CELECOXIB 200 MG/1
200 CAPSULE ORAL 2 TIMES DAILY
Qty: 180 CAPSULE | Refills: 1 | Status: SHIPPED | OUTPATIENT
Start: 2021-09-29 | End: 2022-03-30

## 2021-09-29 NOTE — TELEPHONE ENCOUNTER
Rx Refill Note  Requested Prescriptions     Pending Prescriptions Disp Refills   • celecoxib (CeleBREX) 200 MG capsule 180 capsule 1     Sig: Take 1 capsule by mouth 2 (Two) Times a Day.      Last office visit with prescribing clinician: 9/20/2021      Next office visit with prescribing clinician: Visit date not found            Caroline Smith  09/29/21, 14:57 EDT

## 2021-09-29 NOTE — TELEPHONE ENCOUNTER
Caller: Flaquita Cr    Relationship: Self      Medication requested (name and dosage): celecoxib (CeleBREX) 200 MG capsule    Pharmacy where request should be sent: [facility]/pharmacy #[id number] - [place], [place] - [address] - [phone]  - [phone] FX      Additional details provided by patient: HAS 1 WEEK OF MEDS LEFT     Best call back number: [phone]    Does the patient have less than a 3 day supply:  [] Yes  [x] No    Onelia Givens Rep   09/29/21 14:36 EDT

## 2021-10-05 RX ORDER — ASPIRIN 81 MG/1
TABLET ORAL
Qty: 90 TABLET | Refills: 2 | Status: SHIPPED | OUTPATIENT
Start: 2021-10-05

## 2021-10-22 DIAGNOSIS — G57.93 NEUROPATHY OF BOTH FEET: ICD-10-CM

## 2021-10-22 NOTE — TELEPHONE ENCOUNTER
Caller: Flaquita Cr    Relationship: Self      Medication requested (name and dosage):   SEE BELOW AND SEE NOTES PLEASE    Requested Prescriptions:   Requested Prescriptions     Pending Prescriptions Disp Refills   • tiZANidine (ZANAFLEX) 2 MG tablet 60 tablet 3     Sig: Take 1 tablet by mouth At Night As Needed for Muscle Spasms.        Pharmacy where request should be sent:   [facility]/pharmacy #[id number] - [place], [place] - [address] - [phone]  - [phone]   [phone]    Additional details provided by patient:  PATIENT CALLED IN AND IS OUT OF HER TIZANIDINE. DR GUARDADO TOLD HER TO TAKE 2 EVERY 8 HOURS. PATIENT HAS BEEN TAKING 1 IN THE MORNING AND 2 AT NIGHT AND OCCASIONALLY 1 IN THE AFTERNOON. REQUESTS AN INCREASE IN MEDICATION. SHE WOULD LIKE TO BE PRESCRIBED 4 PER DAY. TO TAKE 1 IN THE MORNING, 1 IN THE AFTERNOON (PRN) AND 2 IN THE EVENING. THE PHARMACY WILL NOT REFILL HER MEDICATION UNTIL December 1ST DUE TO CURRENT INSTRUCTIONS. PLEASE HAVE DR GUARDADO REVIEW THIS AND CALL PATIENT TO ADVISE.    Best call back number: [phone] (H)    Does the patient have less than a 3 day supply:  [x] Yes  [] No    Onelia Nelson Rep   10/22/21 09:00 EDT

## 2021-10-25 RX ORDER — TIZANIDINE 2 MG/1
2 TABLET ORAL NIGHTLY PRN
Qty: 60 TABLET | Refills: 3 | Status: SHIPPED | OUTPATIENT
Start: 2021-10-25 | End: 2021-10-27

## 2021-10-25 NOTE — TELEPHONE ENCOUNTER
Rx Refill Note  Requested Prescriptions     Pending Prescriptions Disp Refills   • tiZANidine (ZANAFLEX) 2 MG tablet 60 tablet 3     Sig: Take 1 tablet by mouth At Night As Needed for Muscle Spasms.      Last office visit with prescribing clinician: 9/20/2021      Next office visit with prescribing clinician:NURY 12/20/21    Mercedes Villa  10/25/21, 15:43 EDT

## 2021-10-25 NOTE — TELEPHONE ENCOUNTER
PATIENT CALLED IN AND WANTED TO KNOW IF YOU COULD CALL THIS IN TO [facility],     SHE WILL GET IT FASTER     MEDICATION :  tiZANidine (ZANAFLEX) 2 MG tablet    PHARMACY:  [facility] [address] [place] [address] [address] - [phone]  - [phone]   [phone]    PATIENT   Flaquita Cr (Self) [phone] ()

## 2021-10-26 NOTE — TELEPHONE ENCOUNTER
Pt called back to check on status of her request. [facility] will not fill the medication until December if the directions are not change to 2 Q D.  Please note pt is about to run out of medication.   Pt ask that she is called when this has been taken care of.

## 2021-10-27 DIAGNOSIS — G57.93 NEUROPATHY OF BOTH FEET: ICD-10-CM

## 2021-10-27 RX ORDER — TIZANIDINE 2 MG/1
2 TABLET ORAL EVERY 6 HOURS PRN
Qty: 120 TABLET | Refills: 3 | Status: SHIPPED | OUTPATIENT
Start: 2021-10-27 | End: 2021-10-28 | Stop reason: SDUPTHER

## 2021-10-27 NOTE — TELEPHONE ENCOUNTER
Patient is out of her zanaflex & is wanting this med refilled to [facility] with the directions & quantity changed to what she &  discussed. PT takes this medication 2 tabs twice a day

## 2021-10-27 NOTE — TELEPHONE ENCOUNTER
PATIENT STATES DR GUARDADO TOLD HER TO TAKE 2 EVERY 8 HOURS. PATIENT HAS BEEN TAKING 1 IN THE MORNING AND 2 AT NIGHT AND OCCASIONALLY 1 IN THE AFTERNOON. REQUESTS AN INCREASE IN MEDICATION. SHE WOULD LIKE TO BE PRESCRIBED 4 PER DAY. TO TAKE 1 IN THE MORNING, 1 IN THE AFTERNOON (PRN) AND 2 IN THE EVENING. THE PHARMACY WILL NOT REFILL HER MEDICATION UNTIL December 1ST DUE TO CURRENT INSTRUCTIONS. PLEASE HAVE DR GUARDADO REVIEW THIS AND CALL PATIENT TO ADVISE.

## 2021-10-28 DIAGNOSIS — G57.93 NEUROPATHY OF BOTH FEET: ICD-10-CM

## 2021-10-28 RX ORDER — TIZANIDINE 2 MG/1
TABLET ORAL
Qty: 120 TABLET | Refills: 3 | Status: SHIPPED | OUTPATIENT
Start: 2021-10-28 | End: 2021-11-18

## 2021-10-28 NOTE — TELEPHONE ENCOUNTER
Caller: Flaquita Cr    Relationship: Self    Requested Prescriptions:   Requested Prescriptions     Pending Prescriptions Disp Refills   • tiZANidine (ZANAFLEX) 2 MG tablet 120 tablet 3     Sig: Take 1 tablet by mouth Every 6 (Six) Hours As Needed for Muscle Spasms.        Pharmacy where request should be sent: [id number] - [address] - [phone]  - [phone] FX    Additional details provided by patient: PATIENT IS COMPLETELY OUT AND NEEDS A NEW PRESCRIPTION WITH REFILLS, WROTE TO ALLOW HER TO TAKE UP TO 4 TABLETS DAILY.    PLEASE GIVE PATIENT A CALL WHEN SEEING THIS MESSAGE TO INFORM HER WHATS GOING ON AND WHERE WE  THIS REQUEST. PATIENT IS CURIOUS WHAT NEEDS TO BE DONE IN ORDER TO GET THIS ACCOMPLISHED.      Best call back number:  [phone] (H)    Does the patient have less than a 3 day supply:  [x] Yes  [] No    PRITI Miguel   10/28/21 09:37 EDT

## 2021-11-17 ENCOUNTER — OFFICE VISIT (OUTPATIENT)
Dept: FAMILY MEDICINE CLINIC | Facility: CLINIC | Age: 61
End: 2021-11-17

## 2021-11-17 VITALS
WEIGHT: 200.2 LBS | BODY MASS INDEX: 34.18 KG/M2 | SYSTOLIC BLOOD PRESSURE: 140 MMHG | HEART RATE: 102 BPM | HEIGHT: 64 IN | OXYGEN SATURATION: 96 % | DIASTOLIC BLOOD PRESSURE: 80 MMHG | TEMPERATURE: 98 F

## 2021-11-17 DIAGNOSIS — R10.819 LOWER ABDOMINAL TENDERNESS: Primary | ICD-10-CM

## 2021-11-17 DIAGNOSIS — M54.50 ACUTE MIDLINE LOW BACK PAIN WITHOUT SCIATICA: ICD-10-CM

## 2021-11-17 LAB
BILIRUB BLD-MCNC: NEGATIVE MG/DL
CLARITY, POC: CLEAR
COLOR UR: YELLOW
EXPIRATION DATE: ABNORMAL
GLUCOSE UR STRIP-MCNC: NEGATIVE MG/DL
KETONES UR QL: NEGATIVE
LEUKOCYTE EST, POC: NEGATIVE
Lab: ABNORMAL
NITRITE UR-MCNC: NEGATIVE MG/ML
PH UR: 6 [PH] (ref 5–8)
PROT UR STRIP-MCNC: ABNORMAL MG/DL
RBC # UR STRIP: NEGATIVE /UL
SP GR UR: 1.02 (ref 1–1.03)
UROBILINOGEN UR QL: NORMAL

## 2021-11-17 PROCEDURE — 81003 URINALYSIS AUTO W/O SCOPE: CPT | Performed by: NURSE PRACTITIONER

## 2021-11-17 PROCEDURE — 99214 OFFICE O/P EST MOD 30 MIN: CPT | Performed by: NURSE PRACTITIONER

## 2021-11-17 NOTE — PATIENT INSTRUCTIONS
Continue ice/heat to lower back, whichever feels better.  Follow up pending lab/test results.  Follow up in 1 month for physical with labs, or sooner if symptoms persist or worsen.

## 2021-11-17 NOTE — PROGRESS NOTES
Subjective   Flaquita Cr is a 61 y.o. female.     Chief Complaint   Patient presents with   • Back Pain     back and front in hips ongoing        History of Present Illness   Patient of Dr. Garza and is new to me; patient presents with c/o pain in bilateral lower back pain and hips; discomfort wraps around from lower back to anterior hips; takes Celebrex daily and was taking Tizanidine nightly as needed; started Tizanidine for neuropathy in feet last office visit and helped pain in right foot, but did not help pain in left foot; called and tried higher dose TID; taking higher dose at night helped neuropathy in both feet; ran out of medication due to taking medication more often; made too sleepy to take 2 tabs during the day; then back went out, has had problems in past; has bulging discs in lower back; when walks will start out fine, and then start waddling due to pain in hips; had 2 episodes of feeling like lost balance and having weakness and attributed to Tizanidine; has taken about 2-2.5 weeks to be able to walk without assistance; feels like typical flare of back, but has persisted; when symptoms started, had stepped out of shower with towel and went to bend over and knees buckled due to pain in lower back; for period of time, motion of sitting and raising up was very painful; had hard time walking, had to scoot feet; had to use walker or cane for period of time; yesterday woke up during the night with a lot of discomfort and has been waking up with increased discomfort in the mornings; typically pain improved in the mornings and worsens later in the day; has noted back pain worse with need to have BM, some improvement in pain with BM; no loose BMs or diarrhea currently; has had some urge incontinence; has tenderness in lower abdomen; takes daily probiotic; has been taking THC gummy and is keeping neuropathy in feet controlled; has been taking Norco from sister and has helped; has been taking 1 Norco  daily; has not been very active due to situation; no radiation of pain down legs; no numbness in legs; also has discomfort in left side of abdomen; has tried Baclofen in past; currently taking Norflex again as has seemed to work better than others.    Has had MRI lower back recently due to nerve pain; has had some weakness; has tried inversion table and helped.    Has IBS-D, endometriosis with h/o partial hysterectomy, colon polyps, due to for colonscopy; history of aorta bi-fem bypass.     was present during the history-taking and subsequent discussion with this patient.  Patient agrees to the presence of the individual during this visit.      The following portions of the patient's history were reviewed and updated as appropriate: allergies, current medications, past family history, past medical history, past social history, past surgical history and problem list.    Current Outpatient Medications on File Prior to Visit   Medication Sig   • Acetaminophen (TYLENOL ARTHRITIS PAIN PO) Take 650 mg by mouth 2 (Two) Times a Day As Needed.   • albuterol sulfate HFA (ProAir HFA) 108 (90 Base) MCG/ACT inhaler Inhale 2 puffs Every 4 (Four) Hours As Needed for Wheezing or Shortness of Air.   • aspirin 81 MG EC tablet TAKE 1 TABLET BY MOUTH EVERY DAY   • azelastine (ASTEPRO) 0.15 % solution nasal spray 1 spray into the nostril(s) as directed by provider Daily.   • celecoxib (CeleBREX) 200 MG capsule Take 1 capsule by mouth 2 (Two) Times a Day.   • cetirizine (ZyrTEC Allergy) 10 MG tablet Take 1 tablet by mouth Daily.   • Cholecalciferol (VITAMIN D) 50 MCG (2000 UT) tablet Take 2,000 Units by mouth Daily.   • clopidogrel (PLAVIX) 75 MG tablet TAKE 1 TABLET BY MOUTH EVERY DAY   • [employer] Digestive Probiotic 250 MG capsule TAKE 1 CAPSULE BY MOUTH EVERY DAY   • diphenhydrAMINE (BENADRYL) 25 mg capsule Take 25 mg by mouth Every 6 (Six) Hours As Needed for Itching.   • flunisolide (NASALIDE) 25 MCG/ACT (0.025%) solution nasal  spray SPRAY 2 SPRAYS EVERY 12 HOURS AS DIRECTED   • fluticasone (FLONASE) 50 MCG/ACT nasal spray 2 sprays into the nostril(s) as directed by provider Daily.   • Fluticasone-Umeclidin-Vilant (TRELEGY ELLIPTA) 100-62.5-25 MCG/INH aerosol powder  Inhale 1 puff.   • guaiFENesin (Mucinex) 600 MG 12 hr tablet Take 2 tablets by mouth 2 (Two) Times a Day.   • montelukast (SINGULAIR) 10 MG tablet TAKE 1 TABLET BY MOUTH EVERYDAY AT BEDTIME   • MULTIPLE VITAMIN PO Take 1 tablet by mouth Daily.   • Omega-3 Fatty Acids (OMEGA 3 PO) Take  by mouth Daily. 2 caps daily   • orphenadrine (NORFLEX) 100 MG 12 hr tablet Take 100 mg by mouth 2 (Two) Times a Day.   • oxyCODONE-acetaminophen (PERCOCET) 5-325 MG per tablet Take 1 tablet by mouth Every 6 (Six) Hours As Needed.   • Turmeric (QC TUMERIC COMPLEX PO) Take  by mouth Daily. 2 caps daily   • [DISCONTINUED] tiZANidine (ZANAFLEX) 2 MG tablet Take two tablets in the morning and two in the evening     No current facility-administered medications on file prior to visit.        Past Medical History:   Diagnosis Date   • Allergy     Allergies   • Arthritis     failed naproxena dn meloxicam - celebrex works well   • Asthma    • Chest pain    • Circulation problem    • Colon polyp    • COPD (chronic obstructive pulmonary disease) ([facility])     does not use O2   • Depression    • Emphysema of lung ([facility])    • Fatigue    • Gastritis    • GERD (gastroesophageal reflux disease)    • Hyperlipidemia    • Hypertension     just started, no meds   • IBS (irritable bowel syndrome)    • Low back pain    • OAB (overactive bladder)    • Ovarian cyst 1989   • PAD (peripheral artery disease) ([facility])    • Peripheral arterial disease ([facility])    • Right leg pain    • Sleep apnea    • Tobacco abuse        Past Surgical History:   Procedure Laterality Date   • AORTA FEMORAL BYPASS  2011   • CARDIAC CATHETERIZATION N/A 6/26/2020    Procedure: Stent OSIRIS coronary;  Surgeon: David Higgins MD;  Location: [facility]  INVASIVE LOCATION;  Service: Cardiology;  Laterality: N/A;   • CARDIAC CATHETERIZATION N/A 6/26/2020    Procedure: Coronary angiography;  Surgeon: David Higgins MD;  Location:  [facility] INVASIVE LOC[facility];  Service: Cardiology;  Laterality: N/A;   • CARDIAC CATHETERIZATION N/A 6/26/2020    Procedure: Aortic root aortogram;  Surgeon: David Higgins MD;  Location:  [facility] INVASIVE [facility]ATION;  Service: Cardiology;  Laterality: N/A;   • CARDIAC CATHETERIZATION N/A 6/26/2020    Procedure: Percutaneous Coronary Intervention;  Surgeon: David Higgins MD;  Location:  [facility] INVAS[facility];  Service: Cardiology;  Laterality: N/A;   • COLONOSCOPY N/A 8/24/2017    NBIH, diverticulosis, four 5 to 6 mm polyps (one tubular adenoma)   • EPIDURAL BLOCK     • HYSTERECTOMY  1988    endometriosis   • LEG SURGERY Bilateral     for PAD   • ROOT CANAL      R upper jaw with rootcanal       Family History   Problem Relation Age of Onset   • Osteoporosis Mother    • Atrial fibrillation Mother         pacemaker   • Arthritis Mother    • Alzheimer's disease Mother    • Macular degeneration Father    • Cancer Father    • Arthritis Sister    • Gout Brother    • Arthritis Brother    • Colon cancer Maternal Aunt    • Uterine cancer Maternal Aunt    • Breast cancer Neg Hx        Social History     Socioeconomic History   • Marital status:    • Number of children: 3   • Years of education: 12   Tobacco Use   • Smoking status: Current Every Day Smoker     Years: 88.00     Types: Electronic Cigarette, Cigarettes     Start date: 1970   • Smokeless tobacco: Never Used   • Tobacco comment: Began smoking at age 10.  Smoked 1 ppd for 15 years, 2 ppd for 18 years, and 2.5 ppd for 15 years for an 88.5 pack year history.  Currently smoking 4 tobacco cigarettes daily with frequent use of an e-cigarette.  Using e-cig since 2010.   Substance and Sexual Activity   • Alcohol use: Yes     Comment: once per year, holidays/  "CAFFEINE USE: 2 CUPS COFFEE DAILY   • Drug use: No   • Sexual activity: Defer       Review of Systems   Constitutional: Negative for chills, fever, unexpected weight gain and unexpected weight loss. Appetite change: some decrease at times. Fatigue: has improved since has not been taking Baclofen, Baclofen kept awake at night.   HENT: Negative for trouble swallowing.    Eyes: Blurred vision: no acute change in vision, some at times, attributes to staring at computer screen.   Respiratory: Negative for cough and chest tightness. Shortness of breath: inhaler for COPD helps.    Cardiovascular: Negative for chest pain, palpitations and leg swelling.   Gastrointestinal: Negative for blood in stool (mild dark BM), constipation, diarrhea, GERD and indigestion. Abdominal pain: some in lower abdomen.   Endocrine: Negative for polydipsia.   Genitourinary: Negative for dysuria and frequency.   Musculoskeletal: Positive for back pain. Arthralgias: hips and feet.   Skin: Negative for rash.   Neurological: Negative for dizziness, syncope and headache.   Hematological: Does not bruise/bleed easily.       Objective   Vitals:    11/17/21 0815   BP: 140/80   BP Location: Left arm   Patient Position: Sitting   Cuff Size: Adult   Pulse: 102   Temp: 98 °F (36.7 °C)   SpO2: 96%   Weight: 90.8 kg (200 lb 3.2 oz)   Height: 162.6 cm (64.02\")     Body mass index is 34.34 kg/m².    Physical Exam  Vitals and nursing note reviewed.   Constitutional:       General: She is not in acute distress.     Appearance: She is well-developed and well-groomed. She is not diaphoretic.   HENT:      Head: Normocephalic.      Right Ear: External ear normal.      Left Ear: External ear normal.   Eyes:      Conjunctiva/sclera: Conjunctivae normal.   Neck:      Vascular: No carotid bruit.   Cardiovascular:      Rate and Rhythm: Normal rate and regular rhythm.      Pulses: Normal pulses.      Heart sounds: Normal heart sounds. No murmur heard.      Pulmonary:      " Effort: Pulmonary effort is normal. No respiratory distress.      Breath sounds: Normal breath sounds.   Abdominal:      General: Bowel sounds are normal.      Palpations: Abdomen is soft. There is no hepatomegaly or splenomegaly.      Tenderness: There is abdominal tenderness in the right lower quadrant, suprapubic area and left lower quadrant. There is no guarding or rebound.   Musculoskeletal:      Cervical back: Normal range of motion and neck supple. No bony tenderness.      Thoracic back: No bony tenderness.      Lumbar back: No bony tenderness. Negative right straight leg raise test and negative left straight leg raise test.      Right hip: Tenderness (with palpation of lateral hip) present. Normal range of motion (with internal and external rotation).      Left hip: Tenderness (with palpation of lateral hip) present. Normal range of motion (with internal and external rotation).      Right lower leg: No edema.      Left lower leg: No edema.   Skin:     General: Skin is warm and dry.      Findings: No rash.   Neurological:      Mental Status: She is alert and oriented to person, place, and time.      Gait: Abnormal gait: guarded gait, mild limping.      Deep Tendon Reflexes:      Reflex Scores:       Patellar reflexes are 1+ on the right side and 1+ on the left side.  Psychiatric:         Mood and Affect: Mood normal.         Behavior: Behavior normal.         Thought Content: Thought content normal.         Cognition and Memory: Cognition normal.         Judgment: Judgment normal.         Lab Results   Component Value Date    WBC 6.16 06/26/2020    RBC 4.26 06/26/2020    HGB 12.8 06/26/2020    HCT 38.2 06/26/2020    MCV 89.7 06/26/2020    MCH 30.0 06/26/2020    MCHC 33.5 06/26/2020    RDW 14.4 06/26/2020    RDWSD 47.0 06/26/2020    MPV 9.8 06/26/2020     06/26/2020    NEUTRORELPCT 56.6 06/18/2019    LYMPHORELPCT 32.4 06/18/2019    MONORELPCT 9.6 06/18/2019    EOSRELPCT 0.6 06/18/2019    BASORELPCT 0.5  06/18/2019    AUTOIGPER 69.6 03/12/2018    NEUTROABS 6.00 06/24/2020    LYMPHSABS 2.06 06/18/2019    MONOSABS 0.61 06/18/2019    EOSABS 0.41 (H) 06/24/2020    BASOSABS 0.03 06/18/2019    AUTOIGNUM 4.00 03/12/2018    NRBC 0.0 06/18/2019     Lab Results   Component Value Date    GLUCOSE 86 09/20/2021    BUN 17 09/20/2021    CREATININE 1.02 (H) 09/20/2021    EGFRIFNONA 60 09/20/2021    EGFRIFAFRI 69 09/20/2021    BCR 17 09/20/2021    K 4.5 09/20/2021    CO2 24 09/20/2021    CALCIUM 10.1 09/20/2021    PROTENTOTREF 7.7 09/20/2021    ALBUMIN 4.6 09/20/2021    LABIL2 1.5 09/20/2021    AST 24 09/20/2021    ALT 19 09/20/2021      Lab Results   Component Value Date    CHLPL 294 (H) 09/20/2021    TRIG 277 (H) 09/20/2021    HDL 44 09/20/2021    VLDL 55 (H) 09/20/2021     (H) 09/20/2021     Lab Results   Component Value Date    TSH 2.540 09/18/2019     Lab Results   Component Value Date    HGBA1C 6.3 (H) 09/20/2021     Lab Results   Component Value Date    COLORU Yellow 11/17/2021    CLARITYU Clear 11/17/2021    LEUKOCYTESUR Negative 11/17/2021    BILIRUBINUR Negative 11/17/2021 8/18/20 office note of ISAAK Cifuentes, neurosurgery reviewed; patient with c/o low back pain, bilateral hip pain, and bilateral foot numbness and tingling; has degenerative disc disease in lumbar spine; has had epidural injections in past.  8/18/20 MRI lumbar spine: 1. No change when compared to prior MRI of the lumbar spine 08/31/2018. 2. There is mild bilateral facet overgrowth at L4-5 and L5-S1, a small posterior annular tear at L5-S1 with a broad-based posterior central disc bulge or a tiny disc protrusion that does not appear to abut the  traversing S1 nerve roots. The remainder of the lumbar spine MRI is normal with no lumbar disc herniation and no lumbar central canal or foraminal narrowing identified and the etiology of bilateral hip pain and bilateral foot numbness and tinging is not established on this exam. 3. On the axial  T2-weighted images, the proximal aspect of an aortobifemoral bypass graft is visualized and is unchanged.  9/30/20 office note of Dr. Saldivar reviewed; MRI reviewed; no surgical treatment needed; recommended radiofrequency ablation of lumbar facets and possibly the SI joints; referred to pain management, Dr. Pepper; to return if develops radiating sciatica.  7/19/21 vascular office note reviewed: normal GISELLE    Assessment/Plan .  Problem List Items Addressed This Visit     Lower abdominal tenderness - Primary    Current Assessment & Plan     Make sure drinking adequate liquids.         Relevant Orders    POC Urinalysis Dipstick, Automated (Completed)    CBC & Differential    Comprehensive Metabolic Panel    CT Abdomen Pelvis With Contrast    Acute midline low back pain without sciatica    Current Assessment & Plan     Continue ice/heat to lower back, whichever feels better.         Relevant Orders    POC Urinalysis Dipstick, Automated (Completed)    Comprehensive Metabolic Panel    Ambulatory Referral to Physical Therapy Evaluate and treat          Return in about 1 month (around 12/17/2021) for Annual physical, Recheck.or sooner if symptoms persist or worsen.  Patient does not want to try Tizanidine again at lower dose; pt has been taking Norflex and helps some; will continue Celebrex daily; will try physical therapy and see if helps.  Will also get CT abd/pelvis due to sensation of symptoms worse if needs to have BM and then improves after BM.           I spent 39 minutes caring for Flaquita on this date of service. This time includes time spent by me in the following activities:reviewing tests, obtaining and/or reviewing a separately obtained history, performing a medically appropriate examination and/or evaluation , ordering medications, tests, or procedures, documenting information in the medical record and independently interpreting results and communicating that information with the  patient/family/caregiver    COVID-19 Precautions - Patient was compliant in wearing a mask. When I saw the patient, I used appropriate personal protective equipment (PPE) including mask, gloves, and eye shield (standard procedure).  Hand hygiene was completed before and after seeing the patient.

## 2021-11-18 DIAGNOSIS — M54.50 ACUTE MIDLINE LOW BACK PAIN WITHOUT SCIATICA: ICD-10-CM

## 2021-11-18 DIAGNOSIS — M54.16 LUMBAR RADICULOPATHY: Primary | ICD-10-CM

## 2021-11-18 PROBLEM — R10.819 LOWER ABDOMINAL TENDERNESS: Status: ACTIVE | Noted: 2021-11-18

## 2021-11-18 LAB
ALBUMIN SERPL-MCNC: 4.7 G/DL (ref 3.8–4.8)
ALBUMIN/GLOB SERPL: 1.5 {RATIO} (ref 1.2–2.2)
ALP SERPL-CCNC: 92 IU/L (ref 44–121)
ALT SERPL-CCNC: 22 IU/L (ref 0–32)
AST SERPL-CCNC: 25 IU/L (ref 0–40)
BASOPHILS # BLD AUTO: 0.1 X10E3/UL (ref 0–0.2)
BASOPHILS NFR BLD AUTO: 1 %
BILIRUB SERPL-MCNC: 0.3 MG/DL (ref 0–1.2)
BUN SERPL-MCNC: 20 MG/DL (ref 8–27)
BUN/CREAT SERPL: 17 (ref 12–28)
CALCIUM SERPL-MCNC: 10.1 MG/DL (ref 8.7–10.3)
CHLORIDE SERPL-SCNC: 99 MMOL/L (ref 96–106)
CO2 SERPL-SCNC: 22 MMOL/L (ref 20–29)
CREAT SERPL-MCNC: 1.15 MG/DL (ref 0.57–1)
EOSINOPHIL # BLD AUTO: 0.1 X10E3/UL (ref 0–0.4)
EOSINOPHIL NFR BLD AUTO: 1 %
ERYTHROCYTE [DISTWIDTH] IN BLOOD BY AUTOMATED COUNT: 13.9 % (ref 11.7–15.4)
GLOBULIN SER CALC-MCNC: 3.1 G/DL (ref 1.5–4.5)
GLUCOSE SERPL-MCNC: 108 MG/DL (ref 65–99)
HCT VFR BLD AUTO: 45.4 % (ref 34–46.6)
HGB BLD-MCNC: 15.2 G/DL (ref 11.1–15.9)
IMM GRANULOCYTES # BLD AUTO: 0.1 X10E3/UL (ref 0–0.1)
IMM GRANULOCYTES NFR BLD AUTO: 1 %
LYMPHOCYTES # BLD AUTO: 1.9 X10E3/UL (ref 0.7–3.1)
LYMPHOCYTES NFR BLD AUTO: 18 %
MCH RBC QN AUTO: 30.8 PG (ref 26.6–33)
MCHC RBC AUTO-ENTMCNC: 33.5 G/DL (ref 31.5–35.7)
MCV RBC AUTO: 92 FL (ref 79–97)
MONOCYTES # BLD AUTO: 0.9 X10E3/UL (ref 0.1–0.9)
MONOCYTES NFR BLD AUTO: 9 %
NEUTROPHILS # BLD AUTO: 7.2 X10E3/UL (ref 1.4–7)
NEUTROPHILS NFR BLD AUTO: 70 %
PLATELET # BLD AUTO: 378 X10E3/UL (ref 150–450)
POTASSIUM SERPL-SCNC: 4.5 MMOL/L (ref 3.5–5.2)
PROT SERPL-MCNC: 7.8 G/DL (ref 6–8.5)
RBC # BLD AUTO: 4.94 X10E6/UL (ref 3.77–5.28)
SODIUM SERPL-SCNC: 139 MMOL/L (ref 134–144)
WBC # BLD AUTO: 10.1 X10E3/UL (ref 3.4–10.8)

## 2021-11-18 RX ORDER — PREDNISONE 20 MG/1
TABLET ORAL
Qty: 7 TABLET | Refills: 0 | Status: SHIPPED | OUTPATIENT
Start: 2021-11-18 | End: 2021-11-24

## 2022-01-28 ENCOUNTER — TELEMEDICINE (OUTPATIENT)
Dept: FAMILY MEDICINE CLINIC | Facility: CLINIC | Age: 62
End: 2022-01-28

## 2022-01-28 DIAGNOSIS — Z20.822 ENCOUNTER BY TELEHEALTH FOR SUSPECTED COVID-19: Primary | ICD-10-CM

## 2022-01-28 DIAGNOSIS — Z20.822 EXPOSURE TO CONFIRMED CASE OF COVID-19: ICD-10-CM

## 2022-01-28 PROCEDURE — 99213 OFFICE O/P EST LOW 20 MIN: CPT | Performed by: NURSE PRACTITIONER

## 2022-01-28 NOTE — PROGRESS NOTES
Chief Complaint  Covid-19 exposure  You have chosen to receive care through a telephone visit. Do you consent to use a telephone visit for your medical care today? Yes  Subjective          Flaquita Cr presents to [facility]  This is my first time seeing this patient.     Patient reports that all 3 people who live in her home with her tested positive for Covid-19 within the last week, she has been taking care of all three people. Symptoms started about 3 days ago, include runny nose (clear drainage), congestion, body aches, chills, and diarrhea. She reports headaches but has had a headache for the last 2 weeks before people got sick with Covid. She has an appointment with Dr. Garza on 2/3/22 and was unable to effectively answer the questionnaire from the text she received prior to her appointment about the Covid questions. She was told that she should make an appointment to possibly be tested for Covid-19 prior to her visit with Dr. Garza next week. She took her blood pressure at home with her home cuff, numbers read: 118/57, 107. Patient reports that she has been taking immodium for her diarrhea, tylenol for chills and body aches, vitamin d and zinc, and Emergen-C.         Objective   Vital Signs:   There were no vitals taken for this visit.    Physical Exam  Constitutional:       General: She is awake.   Neurological:      Mental Status: She is alert.   Psychiatric:         Attention and Perception: Attention normal.         Speech: Speech normal.         Behavior: Behavior is cooperative.        Result Review :     Assessment and Plan    Diagnoses and all orders for this visit:    1. Encounter by telehealth for suspected COVID-19 (Primary)  -     COVID-19,[facility] ROUTINE, NP/OP SWAB IN TRANSPORT MEDIA OR ESWAB 72 HR TAT - Swab, Nasopharynx    2. Exposure to confirmed case of COVID-19  -     COVID-19,[facility] ROUTINE, NP/OP SWAB IN TRANSPORT MEDIA OR ESWAB 72 HR TAT - Swab,  Nasopharynx    Ordered Covid-19 nasal swab due to prolonged exposure from people in patient's household. Will notify patient of results. Discussed with patient to drink plenty of water and get plenty of rest. Encouraged patient to continue taking vitamin supplements and Emergen-C to help immune support. Also discussed with patient that she can continue with immodium for diarrhea relief but to follow directions of immodium and can follow BRAT diet to help with diarrhea. May continue tylenol for body aches and chills as well.     I spent 25 minutes caring for Flaquita on this date of service. This time includes time spent by me in the following activities:obtaining and/or reviewing a separately obtained history, counseling and educating the patient/family/caregiver, ordering medications, tests, or procedures and documenting information in the medical record     Follow Up   Return if symptoms worsen or fail to improve.  Patient was given instructions and counseling regarding her condition or for health maintenance advice. Please see specific information pulled into the AVS if appropriate.

## 2022-01-30 LAB
LABCORP SARS-COV-2, NAA 2 DAY TAT: NORMAL
SARS-COV-2 RNA RESP QL NAA+PROBE: DETECTED

## 2022-02-01 ENCOUNTER — TELEPHONE (OUTPATIENT)
Dept: FAMILY MEDICINE CLINIC | Facility: CLINIC | Age: 62
End: 2022-02-01

## 2022-02-01 NOTE — TELEPHONE ENCOUNTER
Patient started with symptoms on 1/25/22 tested on 1/28/22 (positive) still having symptoms wants to know what she can do as far as any medication along with her  Thursday appt

## 2022-02-01 NOTE — TELEPHONE ENCOUNTER
Informed patient we could change visit to video due to still having symptoms. Patient has been taking Mucinex, I recommended her to also take Delsym cough syrup. Patient will also be watching her oxygen

## 2022-02-03 ENCOUNTER — TELEMEDICINE (OUTPATIENT)
Dept: FAMILY MEDICINE CLINIC | Facility: CLINIC | Age: 62
End: 2022-02-03

## 2022-02-03 DIAGNOSIS — U07.1 COVID-19 VIRUS INFECTION: ICD-10-CM

## 2022-02-03 DIAGNOSIS — J45.21 MILD INTERMITTENT ASTHMA WITH EXACERBATION: ICD-10-CM

## 2022-02-03 DIAGNOSIS — G57.93 NEUROPATHY OF BOTH FEET: Primary | ICD-10-CM

## 2022-02-03 PROCEDURE — 99214 OFFICE O/P EST MOD 30 MIN: CPT | Performed by: INTERNAL MEDICINE

## 2022-02-03 RX ORDER — PREGABALIN 75 MG/1
75 CAPSULE ORAL 2 TIMES DAILY
Qty: 60 CAPSULE | Refills: 2 | Status: SHIPPED | OUTPATIENT
Start: 2022-02-03 | End: 2022-07-18 | Stop reason: SINTOL

## 2022-02-03 RX ORDER — METHYLPREDNISOLONE 4 MG/1
TABLET ORAL
Qty: 21 TABLET | Refills: 0 | Status: SHIPPED | OUTPATIENT
Start: 2022-02-03 | End: 2022-07-18

## 2022-02-03 NOTE — PROGRESS NOTES
"Kye rC is a 61 y.o. female.     Chief Complaint   Patient presents with   • Foot Pain   • History of Covid infection with some wheezing       History of Present Illness   You have chosen to receive care through a telehealth visit.  Do you consent to use a video/audio connection for your medical care today? Yes  Unable to complete visit using a video connection to the patient. A phone visit was used to complete this visits. Total time of discussion was 33 minutes.  Patient attempted and failed log into the my chart and the [employer] video conferencing.  Was converted to telephone visit    Patient for televisit to discuss chronic persistent pain in the feet.  Was evaluated by vascular with good flow, neurosurgery not related to the back (MRI of lumbar 9/8/2020), podiatrist with [facility] and given gabapentin that makes her \"loopy\".  Using tylenol with no relief.  Tried celebrex high dose but not really helping and has stopped.  Tried cymbalta, meloxicam, naproxen in past with no relief.  Tramadol helps at 100 mg but causes severe itching, was on baclofen but was feeling it was keeping her awake and changed to orphenadrine at night.  Flexeril makes to sleepy.      Patient tested positive for Covid 1/28/22 for symptoms beginning 1/25/22.  She has had positive contacts within the home of multiple family members and symptoms of runny nose, congestion, cough, body aches, chills, diarrhea, and headache.  Patient has been having some wheezing with history ofDenies CP, SOA    The following portions of the patient's history were reviewed and updated as appropriate: allergies, current medications, past family history, past medical history, past social history, past surgical history and problem list.    Depression Screen:  No flowsheet data found.    Past Medical History:   Diagnosis Date   • Allergy     Allergies   • Arthritis     failed naproxena dn meloxicam - celebrex works well   • Asthma    • " Chest pain    • Circulation problem    • Colon polyp    • COPD (chronic obstructive pulmonary disease) ([facility])     does not use O2   • Depression    • Emphysema of lung ([facility])    • Fatigue    • Gastritis    • GERD (gastroesophageal reflux disease)    • Hyperlipidemia    • Hypertension     just started, no meds   • IBS (irritable bowel syndrome)    • Low back pain    • OAB (overactive bladder)    • Ovarian cyst 1989   • PAD (peripheral artery disease) ([facility])    • Peripheral arterial disease ([facility])    • Right leg pain    • Sleep apnea    • Tobacco abuse        Past Surgical History:   Procedure Laterality Date   • AORTA FEMORAL BYPASS  2011   • CARDIAC CATHETERIZATION N/A 6/26/2020    Procedure: Stent OSIRIS coronary;  Surgeon: David Higgins MD;  Location:  [facility];  Service: Cardiology;  Laterality: N/A;   • CARDIAC CATHETERIZATION N/A 6/26/2020    Procedure: Coronary angiography;  Surgeon: David Higgins MD;  Location:  [facility];  Service: Cardiology;  Laterality: N/A;   • CARDIAC CATHETERIZATION N/A 6/26/2020    Procedure: Aortic root aortogram;  Surgeon: David Higgins MD;  Location:  [facility];  Service: Cardiology;  Laterality: N/A;   • CARDIAC CATHETERIZATION N/A 6/26/2020    Procedure: Percutaneous Coronary Intervention;  Surgeon: David Higgins MD;  Location:  [facility];  Service: Cardiology;  Laterality: N/A;   • COLONOSCOPY N/A 8/24/2017    NBIH, diverticulosis, four 5 to 6 mm polyps (one tubular adenoma)   • EPIDURAL BLOCK     • HYSTERECTOMY  1988    endometriosis   • LEG SURGERY Bilateral     for PAD   • ROOT CANAL      R upper jaw with rootcanal       Family History   Problem Relation Age of Onset   • Osteoporosis Mother    • Atrial fibrillation Mother         pacemaker   • Arthritis Mother    • Alzheimer's disease Mother    • Macular degeneration Father    • Cancer Father    • Arthritis Sister    • Gout  Brother    • Arthritis Brother    • Colon cancer Maternal Aunt    • Uterine cancer Maternal Aunt    • Breast cancer Neg Hx        Social History     Socioeconomic History   • Marital status:    • Number of children: 3   • Years of education: 12   Tobacco Use   • Smoking status: Current Every Day Smoker     Years: 88.00     Types: Electronic Cigarette, Cigarettes     Start date: 1970   • Smokeless tobacco: Never Used   • Tobacco comment: Began smoking at age 10.  Smoked 1 ppd for 15 years, 2 ppd for 18 years, and 2.5 ppd for 15 years for an 88.5 pack year history.  Currently smoking 4 tobacco cigarettes daily with frequent use of an e-cigarette.  Using e-cig since 2010.   Substance and Sexual Activity   • Alcohol use: Yes     Comment: once per year, holidays/ CAFFEINE USE: 2 CUPS COFFEE DAILY   • Drug use: No   • Sexual activity: Defer       Current Outpatient Medications   Medication Sig Dispense Refill   • Acetaminophen (TYLENOL ARTHRITIS PAIN PO) Take 650 mg by mouth 2 (Two) Times a Day As Needed.     • albuterol sulfate HFA (ProAir HFA) 108 (90 Base) MCG/ACT inhaler Inhale 2 puffs Every 4 (Four) Hours As Needed for Wheezing or Shortness of Air. 8.5 g 3   • aspirin 81 MG EC tablet TAKE 1 TABLET BY MOUTH EVERY DAY 90 tablet 2   • azelastine (ASTEPRO) 0.15 % solution nasal spray 1 spray into the nostril(s) as directed by provider Daily.     • celecoxib (CeleBREX) 200 MG capsule Take 1 capsule by mouth 2 (Two) Times a Day. 180 capsule 1   • cetirizine (ZyrTEC Allergy) 10 MG tablet Take 1 tablet by mouth Daily. 180 tablet 1   • Cholecalciferol (VITAMIN D) 50 MCG (2000 UT) tablet Take 2,000 Units by mouth Daily. 30 tablet 5   • clopidogrel (PLAVIX) 75 MG tablet TAKE 1 TABLET BY MOUTH EVERY DAY 90 tablet 3   • [employer] Digestive Probiotic 250 MG capsule TAKE 1 CAPSULE BY MOUTH EVERY DAY 30 capsule 1   • diphenhydrAMINE (BENADRYL) 25 mg capsule Take 25 mg by mouth Every 6 (Six) Hours As Needed for Itching.     •  flunisolide (NASALIDE) 25 MCG/ACT (0.025%) solution nasal spray SPRAY 2 SPRAYS EVERY 12 HOURS AS DIRECTED     • fluticasone (FLONASE) 50 MCG/ACT nasal spray 2 sprays into the nostril(s) as directed by provider Daily. 3 bottle 3   • Fluticasone-Umeclidin-Vilant (TRELEGY ELLIPTA) 100-62.5-25 MCG/INH aerosol powder  Inhale 1 puff.     • guaiFENesin (Mucinex) 600 MG 12 hr tablet Take 2 tablets by mouth 2 (Two) Times a Day. 60 tablet 5   • montelukast (SINGULAIR) 10 MG tablet TAKE 1 TABLET BY MOUTH EVERYDAY AT BEDTIME 90 tablet 1   • MULTIPLE VITAMIN PO Take 1 tablet by mouth Daily.     • Omega-3 Fatty Acids (OMEGA 3 PO) Take  by mouth Daily. 2 caps daily     • orphenadrine (NORFLEX) 100 MG 12 hr tablet Take 100 mg by mouth 2 (Two) Times a Day.     • oxyCODONE-acetaminophen (PERCOCET) 5-325 MG per tablet Take 1 tablet by mouth Every 6 (Six) Hours As Needed.     • Turmeric (QC TUMERIC COMPLEX PO) Take  by mouth Daily. 2 caps daily     • methylPREDNISolone (MEDROL) 4 MG dose pack Take as directed on package instructions. 21 tablet 0   • pregabalin (Lyrica) 75 MG capsule Take 1 capsule by mouth 2 (Two) Times a Day. 60 capsule 2     No current facility-administered medications for this visit.       Review of Systems   Constitutional: Negative for activity change, appetite change, fatigue, fever, unexpected weight gain and unexpected weight loss.   HENT: Positive for rhinorrhea. Negative for nosebleeds, trouble swallowing and voice change.    Eyes: Negative for visual disturbance.   Respiratory: Positive for cough and wheezing. Negative for chest tightness and shortness of breath.    Cardiovascular: Negative for chest pain, palpitations and leg swelling.   Gastrointestinal: Negative for abdominal pain, blood in stool, constipation, diarrhea, nausea, vomiting, GERD and indigestion.   Genitourinary: Negative for dysuria, frequency and hematuria.   Musculoskeletal: Negative for arthralgias, back pain and myalgias.   Skin:  Negative for rash and wound.   Neurological: Positive for headache. Negative for dizziness, tremors, weakness, light-headedness, numbness and memory problem.        Bilateral foot pain and numbness with tingling.   Hematological: Negative for adenopathy. Does not bruise/bleed easily.   Psychiatric/Behavioral: Negative for sleep disturbance and depressed mood. The patient is not nervous/anxious.        Objective   There were no vitals taken for this visit.    Physical Exam   Constitutional: No distress.   Severely limited exam secondary to the use of telephone visit.   Pulmonary/Chest: Effort normal.  No respiratory distress.  Coughing throughout the visit and some faint expiratory wheezes could be heard through the phone.   Neurological: She is alert.   Psychiatric: She has a normal mood and affect. Her behavior is normal. Thought content is normal. She does not express abnormal judgement.       Recent Results (from the past 2016 hour(s))   CBC & Differential    Collection Time: 11/17/21  9:05 AM    Specimen: Blood   Result Value Ref Range    WBC 10.1 3.4 - 10.8 x10E3/uL    RBC 4.94 3.77 - 5.28 x10E6/uL    Hemoglobin 15.2 11.1 - 15.9 g/dL    Hematocrit 45.4 34.0 - 46.6 %    MCV 92 79 - 97 fL    MCH 30.8 26.6 - 33.0 pg    MCHC 33.5 31.5 - 35.7 g/dL    RDW 13.9 11.7 - 15.4 %    Platelets 378 150 - 450 x10E3/uL    Neutrophil Rel % 70 Not Estab. %    Lymphocyte Rel % 18 Not Estab. %    Monocyte Rel % 9 Not Estab. %    Eosinophil Rel % 1 Not Estab. %    Basophil Rel % 1 Not Estab. %    Neutrophils Absolute 7.2 (H) 1.4 - 7.0 x10E3/uL    Lymphocytes Absolute 1.9 0.7 - 3.1 x10E3/uL    Monocytes Absolute 0.9 0.1 - 0.9 x10E3/uL    Eosinophils Absolute 0.1 0.0 - 0.4 x10E3/uL    Basophils Absolute 0.1 0.0 - 0.2 x10E3/uL    Immature Granulocyte Rel % 1 Not Estab. %    Immature Grans Absolute 0.1 0.0 - 0.1 x10E3/uL   Comprehensive Metabolic Panel    Collection Time: 11/17/21  9:05 AM    Specimen: Blood   Result Value Ref Range     Glucose 108 (H) 65 - 99 mg/dL    BUN 20 8 - 27 mg/dL    Creatinine 1.15 (H) 0.57 - 1.00 mg/dL    eGFR Non African Am 51 (L) >59 mL/min/1.73    eGFR African Am 59 (L) >59 mL/min/1.73    BUN/Creatinine Ratio 17 12 - 28    Sodium 139 134 - 144 mmol/L    Potassium 4.5 3.5 - 5.2 mmol/L    Chloride 99 96 - 106 mmol/L    Total CO2 22 20 - 29 mmol/L    Calcium 10.1 8.7 - 10.3 mg/dL    Total Protein 7.8 6.0 - 8.5 g/dL    Albumin 4.7 3.8 - 4.8 g/dL    Globulin 3.1 1.5 - 4.5 g/dL    A/G Ratio 1.5 1.2 - 2.2    Total Bilirubin 0.3 0.0 - 1.2 mg/dL    Alkaline Phosphatase 92 44 - 121 IU/L    AST (SGOT) 25 0 - 40 IU/L    ALT (SGPT) 22 0 - 32 IU/L   POC Urinalysis Dipstick, Automated    Collection Time: 11/17/21  9:14 AM    Specimen: Urine   Result Value Ref Range    Color Yellow Yellow, Straw, Dark Yellow, Kimberley    Clarity, UA Clear Clear    Specific Gravity  1.025 1.005 - 1.030    pH, Urine 6.0 5.0 - 8.0    Leukocytes Negative Negative    Nitrite, UA Negative Negative    Protein, POC Trace (A) Negative mg/dL    Glucose, UA Negative Negative, 1000 mg/dL (3+) mg/dL    Ketones, UA Negative Negative    Urobilinogen, UA Normal Normal    Bilirubin Negative Negative    Blood, UA Negative Negative    Lot Number [id number]     Expiration Date 01/08/23    COVID-19,[facility] ROUTINE, NP/OP SWAB IN TRANSPORT MEDIA OR ESWAB 72 HR TAT - Swab, Nasopharynx    Collection Time: 01/28/22 10:30 AM    Specimen: Nasopharynx; Swab   Result Value Ref Range    SARS-CoV-2, [facility] Detected (A) Not Detected   SARS-CoV-2, ROSALIE 2 DAY TAT - ,    Collection Time: 01/28/22 10:30 AM   Result Value Ref Range    [facility] SARS-COV-2, [facility] 2 DAY TAT Performed      Assessment/Plan   Diagnoses and all orders for this visit:    1. Neuropathy of both feet (Primary)  -     pregabalin (Lyrica) 75 MG capsule; Take 1 capsule by mouth 2 (Two) Times a Day.  Dispense: 60 capsule; Refill: 2    2. COVID-19 virus infection    3. Mild intermittent asthma with exacerbation  -      methylPREDNISolone (MEDROL) 4 MG dose pack; Take as directed on package instructions.  Dispense: 21 tablet; Refill: 0    Discussed and reviewed history with patient.  Patient numbness and pain in the feet which appears most likely be a neuropathy.  Spine and vascular have been ruled out.  Does not tolerate gabapentin.  We will initiate and try low-dose Lyrica 75 mg twice daily.  MARCIA run and reviewed.  Risks of the medication include but are not limited to fatigue, somnolence, increased risk of falls, constipation, allergic reaction, dependence, and addiction.  Patient COVID-19 viral infection which is now nearly 10 days since symptoms began.  Is having some wheezing with history of asthma.  We will treat for an asthma exacerbation secondary to the COVID-19 infection.  Steroids sent to the pharmacy.  I discussed with her that if her symptoms worsen or shortness of breath her oxygen saturations of 90 or less there is to go to emergency room.  Patient is understanding.  Unable to complete visit using a video connection to the patient. A phone visit was used to complete this visits. Total time of discussion was 33 minutes.         I spent 33 minutes caring for Flaquita on this date of service. This time includes time spent by me in the following activities:preparing for the visit, reviewing tests, obtaining and/or reviewing a separately obtained history, performing a medically appropriate examination and/or evaluation , counseling and educating the patient/family/caregiver, ordering medications, tests, or procedures and documenting information in the medical record

## 2022-02-10 DIAGNOSIS — M54.50 ACUTE MIDLINE LOW BACK PAIN WITHOUT SCIATICA: ICD-10-CM

## 2022-02-11 RX ORDER — PREDNISONE 20 MG/1
TABLET ORAL
Qty: 7 TABLET | Refills: 0 | OUTPATIENT
Start: 2022-02-11

## 2022-02-14 NOTE — TELEPHONE ENCOUNTER
Caller: Flaquita Cr    Relationship: Self    Best call back number: [phone]    Requested Prescriptions:   Requested Prescriptions     Pending Prescriptions Disp Refills   • orphenadrine (NORFLEX) 100 MG 12 hr tablet       Sig: Take 1 tablet by mouth.        Pharmacy where request should be sent:  [facility] - [phone]  - [phone]        Additional details provided by patient: PATIENT IS OUT OF THIS MEDICATION. CALL PATIENT TO DISCUSS IF NEEDED    Does the patient have less than a 3 day supply:  [x] Yes  [] No    Onelia Tello Rep   02/14/22 15:06 EST

## 2022-02-15 RX ORDER — ORPHENADRINE CITRATE 100 MG/1
100 TABLET, EXTENDED RELEASE ORAL 2 TIMES DAILY PRN
Qty: 60 TABLET | Refills: 2 | Status: SHIPPED | OUTPATIENT
Start: 2022-02-15 | End: 2022-09-15

## 2022-03-30 RX ORDER — CELECOXIB 200 MG/1
CAPSULE ORAL
Qty: 180 CAPSULE | Refills: 1 | Status: SHIPPED | OUTPATIENT
Start: 2022-03-30

## 2022-03-30 RX ORDER — MONTELUKAST SODIUM 10 MG/1
TABLET ORAL
Qty: 90 TABLET | Refills: 1 | Status: SHIPPED | OUTPATIENT
Start: 2022-03-30 | End: 2022-10-27

## 2022-03-30 NOTE — TELEPHONE ENCOUNTER
Rx Refill Note  Requested Prescriptions     Pending Prescriptions Disp Refills   • celecoxib (CeleBREX) 200 MG capsule [Pharmacy Med Name: CELECOXIB 200 MG CAPSULE] 180 capsule 1     Sig: TAKE 1 CAPSULE BY MOUTH TWICE A DAY      Last office visit with prescribing clinician: 2/3/22      Next office visit with prescribing clinician: Visit date not found            Maggie Vasques MA  03/30/22, 07:44 EDT

## 2022-04-29 ENCOUNTER — TRANSCRIBE ORDERS (OUTPATIENT)
Dept: ADMINISTRATIVE | Facility: HOSPITAL | Age: 62
End: 2022-04-29

## 2022-04-29 DIAGNOSIS — Z12.2 ENCOUNTER FOR SCREENING FOR LUNG CANCER: Primary | ICD-10-CM

## 2022-07-18 ENCOUNTER — OFFICE VISIT (OUTPATIENT)
Dept: FAMILY MEDICINE CLINIC | Facility: CLINIC | Age: 62
End: 2022-07-18

## 2022-07-18 VITALS
HEIGHT: 64 IN | DIASTOLIC BLOOD PRESSURE: 76 MMHG | OXYGEN SATURATION: 96 % | WEIGHT: 184.2 LBS | SYSTOLIC BLOOD PRESSURE: 124 MMHG | BODY MASS INDEX: 31.45 KG/M2 | HEART RATE: 94 BPM | TEMPERATURE: 98 F

## 2022-07-18 DIAGNOSIS — L98.9 SKIN LESION OF SCALP: ICD-10-CM

## 2022-07-18 DIAGNOSIS — R73.03 PREDIABETES: Primary | ICD-10-CM

## 2022-07-18 DIAGNOSIS — M25.462 BILATERAL KNEE SWELLING: ICD-10-CM

## 2022-07-18 DIAGNOSIS — I73.9 PAD (PERIPHERAL ARTERY DISEASE): ICD-10-CM

## 2022-07-18 DIAGNOSIS — E78.2 MIXED HYPERLIPIDEMIA: ICD-10-CM

## 2022-07-18 DIAGNOSIS — I25.10 CORONARY ARTERY DISEASE INVOLVING NATIVE CORONARY ARTERY OF NATIVE HEART WITHOUT ANGINA PECTORIS: ICD-10-CM

## 2022-07-18 DIAGNOSIS — G57.93 NEUROPATHY OF BOTH FEET: ICD-10-CM

## 2022-07-18 DIAGNOSIS — M25.461 BILATERAL KNEE SWELLING: ICD-10-CM

## 2022-07-18 PROCEDURE — 99214 OFFICE O/P EST MOD 30 MIN: CPT | Performed by: NURSE PRACTITIONER

## 2022-07-18 NOTE — PROGRESS NOTES
Subjective   Flaquita Cr is a 62 y.o. female.     Chief Complaint   Patient presents with   • Knee Pain     Knots on both knees painful and hard to touch    • Cyst     Top of head ongoing          History of Present Illness   Patient presents with c/o knots on bilateral knees that are hard; no erythema; no pain in knees; has one area that is tender; symptoms on right knee started long time ago, no change in size; then noted lump on left lateral knee last week; no decreased ROM of knees; cannot walk long distance due to bilateral hip pain; discomfort will resolve with rest; has PAD; sees vascular; has had some swelling in left ankle at times, occasional swelling in right ankle; no swelling of legs.    Also, c/o cyst on top of head; area is hard, no tenderness; noted lump years ago.    F/U prediabetes: last A1c 6.3%; would like labs today; will be starting diet that avoids sugars, carbs, breads, etc and increases protein; would like to check A1c today to get baseline before starting diet; has pool at home and has started getting some exercise; has done water therapy in past and does some of those exercises; has pain in feet at times, feet are numb; tried Lyrica after last OV and did not tolerate; heard carbs cause inflammation and plans to cut out carbs to see if helps; takes Celebrex daily and helps some; also takes Tylenol 2 tabs twice daily.    F/U Hyperlipidemia: takes Omega-3 daily; not very healthy diet; plans to start watching saturated fats; fasting today.    Takes Plavix daily since cardiac stent; has not seen cardiology recently since Dr. Tripathi retired; plans to schedule follow up appointment.    The following portions of the patient's history were reviewed and updated as appropriate: allergies, current medications, past family history, past medical history, past social history, past surgical history and problem list.    Current Outpatient Medications on File Prior to Visit   Medication Sig   •  Acetaminophen (TYLENOL ARTHRITIS PAIN PO) Take 650 mg by mouth 2 (Two) Times a Day As Needed.   • albuterol sulfate HFA (ProAir HFA) 108 (90 Base) MCG/ACT inhaler Inhale 2 puffs Every 4 (Four) Hours As Needed for Wheezing or Shortness of Air.   • aspirin 81 MG EC tablet TAKE 1 TABLET BY MOUTH EVERY DAY   • azelastine (ASTEPRO) 0.15 % solution nasal spray 1 spray into the nostril(s) as directed by provider Daily.   • celecoxib (CeleBREX) 200 MG capsule TAKE 1 CAPSULE BY MOUTH TWICE A DAY   • cetirizine (ZyrTEC Allergy) 10 MG tablet Take 1 tablet by mouth Daily.   • Cholecalciferol (VITAMIN D) 50 MCG (2000 UT) tablet Take 2,000 Units by mouth Daily.   • clopidogrel (PLAVIX) 75 MG tablet TAKE 1 TABLET BY MOUTH EVERY DAY   • [employer] Digestive Probiotic 250 MG capsule TAKE 1 CAPSULE BY MOUTH EVERY DAY   • diphenhydrAMINE (BENADRYL) 25 mg capsule Take 25 mg by mouth Every 6 (Six) Hours As Needed for Itching.   • flunisolide (NASALIDE) 25 MCG/ACT (0.025%) solution nasal spray SPRAY 2 SPRAYS EVERY 12 HOURS AS DIRECTED   • fluticasone (FLONASE) 50 MCG/ACT nasal spray 2 sprays into the nostril(s) as directed by provider Daily.   • Fluticasone-Umeclidin-Vilant (TRELEGY) 100-62.5-25 MCG/INH inhaler Inhale 1 puff.   • guaiFENesin (Mucinex) 600 MG 12 hr tablet Take 2 tablets by mouth 2 (Two) Times a Day.   • montelukast (SINGULAIR) 10 MG tablet TAKE 1 TABLET BY MOUTH EVERYDAY AT BEDTIME   • MULTIPLE VITAMIN PO Take 1 tablet by mouth Daily.   • Omega-3 Fatty Acids (OMEGA 3 PO) Take  by mouth Daily. 2 caps daily   • orphenadrine (NORFLEX) 100 MG 12 hr tablet Take 1 tablet by mouth 2 (Two) Times a Day As Needed for Muscle Spasms or Mild Pain .   • Turmeric (QC TUMERIC COMPLEX PO) Take  by mouth Daily. 2 caps daily     No current facility-administered medications on file prior to visit.        Past Medical History:   Diagnosis Date   • Allergy     Allergies   • Arthritis     failed naproxena dn meloxicam - celebrex works well   • Asthma     • Chest pain    • Circulation problem    • Colon polyp    • COPD (chronic obstructive pulmonary disease) ([facility])     does not use O2   • COVID-19 virus infection 2/3/2022   • Depression    • Emphysema of lung ([facility])    • Fatigue    • Gastritis    • GERD (gastroesophageal reflux disease)    • Hyperlipidemia    • Hypertension     just started, no meds   • IBS (irritable bowel syndrome)    • Low back pain    • OAB (overactive bladder)    • Ovarian cyst 1989   • PAD (peripheral artery disease) ([facility])    • Peripheral arterial disease ([facility])    • Right leg pain    • Sleep apnea    • Tobacco abuse        Past Surgical History:   Procedure Laterality Date   • AORTA FEMORAL BYPASS  2011   • CARDIAC CATHETERIZATION N/A 6/26/2020    Procedure: Stent OSIRIS coronary;  Surgeon: David Higgins MD;  Location:  [facility];  Service: Cardiology;  Laterality: N/A;   • CARDIAC CATHETERIZATION N/A 6/26/2020    Procedure: Coronary angiography;  Surgeon: David Higgins MD;  Location:  [facility];  Service: Cardiology;  Laterality: N/A;   • CARDIAC CATHETERIZATION N/A 6/26/2020    Procedure: Aortic root aortogram;  Surgeon: David Higgins MD;  Location:  [facility];  Service: Cardiology;  Laterality: N/A;   • CARDIAC CATHETERIZATION N/A 6/26/2020    Procedure: Percutaneous Coronary Intervention;  Surgeon: David Higgins MD;  Location:  [facility];  Service: Cardiology;  Laterality: N/A;   • COLONOSCOPY N/A 8/24/2017    NBIH, diverticulosis, four 5 to 6 mm polyps (one tubular adenoma)   • EPIDURAL BLOCK     • HYSTERECTOMY  1988    endometriosis   • LEG SURGERY Bilateral     for PAD   • ROOT CANAL      R upper jaw with rootcanal       Family History   Problem Relation Age of Onset   • Osteoporosis Mother    • Atrial fibrillation Mother         pacemaker   • Arthritis Mother    • Alzheimer's disease Mother    • Macular degeneration Father    • Cancer  Father    • Arthritis Sister    • Gout Brother    • Arthritis Brother    • Colon cancer Maternal Aunt    • Uterine cancer Maternal Aunt    • Breast cancer Neg Hx        Social History     Socioeconomic History   • Marital status:    • Number of children: 3   • Years of education: 12   Tobacco Use   • Smoking status: Current Every Day Smoker     Years: 88.00     Types: Electronic Cigarette, Cigarettes     Start date: 1970   • Smokeless tobacco: Never Used   • Tobacco comment: Began smoking at age 10.  Smoked 1 ppd for 15 years, 2 ppd for 18 years, and 2.5 ppd for 15 years for an 88.5 pack year history.  Currently smoking 4 tobacco cigarettes daily with frequent use of an e-cigarette.  Using e-cig since 2010.   Substance and Sexual Activity   • Alcohol use: Yes     Comment: once per year, holidays/ CAFFEINE USE: 2 CUPS COFFEE DAILY   • Drug use: No   • Sexual activity: Defer       Review of Systems   Constitutional: Negative for appetite change, chills, fever, unexpected weight gain and unexpected weight loss. Fatigue: has improved since has decreased intake of carbs.   HENT: Negative for ear pain, sore throat and trouble swallowing.    Eyes: Negative for blurred vision.   Respiratory: Negative for cough, chest tightness and shortness of breath.    Cardiovascular: Negative for chest pain and palpitations.   Gastrointestinal: Negative for abdominal pain (at times with flares of IBS), blood in stool, constipation, GERD and indigestion. Diarrhea: has flares of IBS; had recent flare and followed FODMAP diet and helped.   Endocrine: Negative for polydipsia (needs to drink more liquids).   Genitourinary: Negative for dysuria and frequency.   Musculoskeletal: Back pain: some in lower back, right hip, and bilateral feet; no bladder or bowel dysfunction.   Skin: Negative for rash.   Neurological: Negative for dizziness, light-headedness and headache.   Hematological: Does not bruise/bleed easily.       Objective  "  Vitals:    07/18/22 1449   BP: 124/76   BP Location: Left arm   Patient Position: Sitting   Cuff Size: Adult   Pulse: 94   Temp: 98 °F (36.7 °C)   SpO2: 96%   Weight: 83.6 kg (184 lb 3.2 oz)   Height: 162.6 cm (64.02\")     Body mass index is 31.6 kg/m².    Physical Exam  Vitals and nursing note reviewed.   Constitutional:       General: She is not in acute distress.     Appearance: She is well-developed and well-groomed. She is not diaphoretic.   HENT:      Head: Normocephalic.      Right Ear: External ear normal.      Left Ear: External ear normal.   Eyes:      Conjunctiva/sclera: Conjunctivae normal.   Neck:      Vascular: Carotid bruit (has known blockage per CT) present.   Cardiovascular:      Rate and Rhythm: Normal rate and regular rhythm.      Pulses: Normal pulses.      Heart sounds: Normal heart sounds. No murmur heard.  Pulmonary:      Effort: Pulmonary effort is normal. No respiratory distress.      Breath sounds: Normal breath sounds.   Abdominal:      General: Bowel sounds are normal.      Palpations: Abdomen is soft. There is no hepatomegaly or splenomegaly.      Tenderness: There is generalized abdominal tenderness. There is no guarding or rebound.   Musculoskeletal:      Cervical back: Normal range of motion and neck supple.      Right knee: Crepitus present. No erythema or ecchymosis. Normal range of motion.      Left knee: Crepitus present. No erythema or ecchymosis. Normal range of motion.      Right lower leg: No edema.      Left lower leg: No edema.        Legs:    Skin:     General: Skin is warm and dry.      Findings: No rash.          Neurological:      Mental Status: She is alert and oriented to person, place, and time.      Gait: Abnormal gait: guarded gait.   Psychiatric:         Mood and Affect: Mood normal.         Behavior: Behavior normal.         Thought Content: Thought content normal.         Cognition and Memory: Cognition normal.         Judgment: Judgment normal.         Lab " Results   Component Value Date    WBC 7.0 07/18/2022    RBC 4.71 07/18/2022    HGB 14.8 07/18/2022    HCT 43.5 07/18/2022    MCV 92 07/18/2022    MCH 31.4 07/18/2022    MCHC 34.0 07/18/2022    RDW 13.9 07/18/2022    RDWSD 47.0 06/26/2020    MPV 9.8 06/26/2020     07/18/2022    NEUTRORELPCT 65 07/18/2022    LYMPHORELPCT 23 07/18/2022    MONORELPCT 9 07/18/2022    EOSRELPCT 1 07/18/2022    BASORELPCT 1 07/18/2022    AUTOIGPER 69.6 03/12/2018    NEUTROABS 4.6 07/18/2022    LYMPHSABS 1.6 07/18/2022    MONOSABS 0.6 07/18/2022    EOSABS 0.1 07/18/2022    BASOSABS 0.0 07/18/2022    AUTOIGNUM 4.00 03/12/2018    NRBC 0.0 06/18/2019     Lab Results   Component Value Date    GLUCOSE 88 07/18/2022    BUN 20 07/18/2022    CREATININE 1.21 (H) 07/18/2022    EGFRIFNONA 51 (L) 11/17/2021    EGFRIFAFRI 59 (L) 11/17/2021    BCR 17 07/18/2022    K 4.4 07/18/2022    CO2 20 07/18/2022    CALCIUM 10.1 07/18/2022    PROTENTOTREF 7.7 07/18/2022    ALBUMIN 4.7 07/18/2022    LABIL2 1.6 07/18/2022    AST 24 07/18/2022    ALT 18 07/18/2022      Lab Results   Component Value Date    TSH 2.540 09/18/2019     Lab Results   Component Value Date    HGBA1C 6.1 (H) 07/18/2022         Assessment    Problem List Items Addressed This Visit     PAD (peripheral artery disease) (HCC)    Current Assessment & Plan     Follow up as scheduled with vascular.           Hyperlipidemia    Current Assessment & Plan     Continue Omega-3 daily.  Continue to work on healthy diet and exercise.           Relevant Orders    Comprehensive Metabolic Panel (Completed)    Lipid Panel With LDL / HDL Ratio (Completed)    Prediabetes - Primary    Current Assessment & Plan     Continue to decrease intake of carbs/sugars in diet and increase exercise.           Relevant Orders    Comprehensive Metabolic Panel (Completed)    Lipid Panel With LDL / HDL Ratio (Completed)    Hemoglobin A1c (Completed)    CAD (coronary artery disease)    Current Assessment & Plan     Continue  Plavix daily.  Schedule a follow up appointment with cardiology.           Neuropathy of both feet    Current Assessment & Plan     Continue Celebrex daily and Tylenol twice daily.           Bilateral knee swelling    Current Assessment & Plan     Continue to monitor lumps on bilateral knees for any change in size or symptom.           Relevant Orders    CBC & Differential (Completed)    Skin lesion of scalp    Relevant Orders    Ambulatory Referral to Dermatology          Return in about 3 months (around 10/18/2022) for Annual physical, Recheck.or sooner if symptoms persist or worsen.  Areas of swelling in bilateral knees symmetrical and no noted change in size per patient; no erythema, ecchymosis, pain, or decreased ROM; no swelling in legs; will consider referral to ortho if any change noted in areas or if any new symptoms.         COVID-19 Precautions - Patient was compliant in wearing a mask. When I saw the patient, I used appropriate personal protective equipment (PPE) including mask, gloves, and eye shield (standard procedure).  Hand hygiene was completed before and after seeing the patient.

## 2022-07-18 NOTE — PATIENT INSTRUCTIONS
Continue to monitor areas on bilateral knees.  Continue to work on healthy diet and exercise as tolerated.  Follow up pending lab results.  Follow up in 3 months for physical with fasting labs, or sooner if symptoms persist or worsen.

## 2022-07-19 PROBLEM — M25.462 BILATERAL KNEE SWELLING: Status: ACTIVE | Noted: 2022-07-19

## 2022-07-19 PROBLEM — U07.1 COVID-19 VIRUS INFECTION: Status: RESOLVED | Noted: 2022-02-03 | Resolved: 2022-07-19

## 2022-07-19 PROBLEM — M25.461 BILATERAL KNEE SWELLING: Status: ACTIVE | Noted: 2022-07-19

## 2022-07-19 PROBLEM — L98.9 SKIN LESION OF SCALP: Status: ACTIVE | Noted: 2022-07-19

## 2022-07-19 LAB
ALBUMIN SERPL-MCNC: 4.7 G/DL (ref 3.8–4.8)
ALBUMIN/GLOB SERPL: 1.6 {RATIO} (ref 1.2–2.2)
ALP SERPL-CCNC: 84 IU/L (ref 44–121)
ALT SERPL-CCNC: 18 IU/L (ref 0–32)
AST SERPL-CCNC: 24 IU/L (ref 0–40)
BASOPHILS # BLD AUTO: 0 X10E3/UL (ref 0–0.2)
BASOPHILS NFR BLD AUTO: 1 %
BILIRUB SERPL-MCNC: <0.2 MG/DL (ref 0–1.2)
BUN SERPL-MCNC: 20 MG/DL (ref 8–27)
BUN/CREAT SERPL: 17 (ref 12–28)
CALCIUM SERPL-MCNC: 10.1 MG/DL (ref 8.7–10.3)
CHLORIDE SERPL-SCNC: 104 MMOL/L (ref 96–106)
CHOLEST SERPL-MCNC: 318 MG/DL (ref 100–199)
CO2 SERPL-SCNC: 20 MMOL/L (ref 20–29)
CREAT SERPL-MCNC: 1.21 MG/DL (ref 0.57–1)
EGFRCR SERPLBLD CKD-EPI 2021: 51 ML/MIN/1.73
EOSINOPHIL # BLD AUTO: 0.1 X10E3/UL (ref 0–0.4)
EOSINOPHIL NFR BLD AUTO: 1 %
ERYTHROCYTE [DISTWIDTH] IN BLOOD BY AUTOMATED COUNT: 13.9 % (ref 11.7–15.4)
GLOBULIN SER CALC-MCNC: 3 G/DL (ref 1.5–4.5)
GLUCOSE SERPL-MCNC: 88 MG/DL (ref 65–99)
HBA1C MFR BLD: 6.1 % (ref 4.8–5.6)
HCT VFR BLD AUTO: 43.5 % (ref 34–46.6)
HDLC SERPL-MCNC: 45 MG/DL
HGB BLD-MCNC: 14.8 G/DL (ref 11.1–15.9)
IMM GRANULOCYTES # BLD AUTO: 0 X10E3/UL (ref 0–0.1)
IMM GRANULOCYTES NFR BLD AUTO: 1 %
LDLC SERPL CALC-MCNC: 231 MG/DL (ref 0–99)
LDLC/HDLC SERPL: 5.1 RATIO (ref 0–3.2)
LYMPHOCYTES # BLD AUTO: 1.6 X10E3/UL (ref 0.7–3.1)
LYMPHOCYTES NFR BLD AUTO: 23 %
MCH RBC QN AUTO: 31.4 PG (ref 26.6–33)
MCHC RBC AUTO-ENTMCNC: 34 G/DL (ref 31.5–35.7)
MCV RBC AUTO: 92 FL (ref 79–97)
MONOCYTES # BLD AUTO: 0.6 X10E3/UL (ref 0.1–0.9)
MONOCYTES NFR BLD AUTO: 9 %
NEUTROPHILS # BLD AUTO: 4.6 X10E3/UL (ref 1.4–7)
NEUTROPHILS NFR BLD AUTO: 65 %
PLATELET # BLD AUTO: 376 X10E3/UL (ref 150–450)
POTASSIUM SERPL-SCNC: 4.4 MMOL/L (ref 3.5–5.2)
PROT SERPL-MCNC: 7.7 G/DL (ref 6–8.5)
RBC # BLD AUTO: 4.71 X10E6/UL (ref 3.77–5.28)
SODIUM SERPL-SCNC: 140 MMOL/L (ref 134–144)
TRIGL SERPL-MCNC: 210 MG/DL (ref 0–149)
VLDLC SERPL CALC-MCNC: 42 MG/DL (ref 5–40)
WBC # BLD AUTO: 7 X10E3/UL (ref 3.4–10.8)

## 2022-07-20 RX ORDER — CLOPIDOGREL BISULFATE 75 MG/1
TABLET ORAL
Qty: 90 TABLET | Refills: 2 | Status: SHIPPED | OUTPATIENT
Start: 2022-07-20

## 2022-07-21 ENCOUNTER — TELEPHONE (OUTPATIENT)
Dept: FAMILY MEDICINE CLINIC | Facility: CLINIC | Age: 62
End: 2022-07-21

## 2022-07-21 NOTE — TELEPHONE ENCOUNTER
The patient was returning Jazmin's call about her lab results. I read her the message Jazmin left and the patient verbalized understanding. She said she would like Jazmin to give her a call so she can go over her cholesterol in more detail. Please advise [phone].

## 2022-07-22 DIAGNOSIS — R22.42 SKIN LUMP OF LEG, LEFT: Primary | ICD-10-CM

## 2022-07-22 NOTE — TELEPHONE ENCOUNTER
Spoke with patient over the phone; reviewed recent lab results; discussed cholesterol is very high; patient declines medication for cholesterol at this time and accepts risks; patient has plans to adjust diet and would like to try that first; will repeat lipid panel in 3 months; patient agrees to start cholesterol medication if cholesterol remains increased after dietary changes; patient would like to go ahead and get ultrasound of soft tissue of left leg to determine what is causing lump; will order ultrasound of soft tissue lower extremity.

## 2022-08-01 ENCOUNTER — TELEPHONE (OUTPATIENT)
Dept: FAMILY MEDICINE CLINIC | Facility: CLINIC | Age: 62
End: 2022-08-01

## 2022-08-01 DIAGNOSIS — R22.41 LOCALIZED SWELLING, MASS, OR LUMP OF LOWER EXTREMITY, RIGHT: ICD-10-CM

## 2022-08-01 DIAGNOSIS — M79.604 PAIN IN MEDIAL RIGHT LOWER EXTREMITY: Primary | ICD-10-CM

## 2022-08-01 NOTE — TELEPHONE ENCOUNTER
THE PATIENT WOULD LIKE TO KNOW IF HER UPCOMING ULTRASOUND CAN BE SCHEDULED FOR BOTH KNEES INSTEAD OF JUST HER LEFT KNEE. THE PATIENT IS EXPERIENCING INCREASED PAIN IN HER RIGHT KNEE AS WELL. PLEASE ADVISE BY CALLING [phone].

## 2022-08-01 NOTE — TELEPHONE ENCOUNTER
Patient states that her right knee Is painful, under neath of the knot, painful achy, she would liek to get both done patient is worried about blood clots, denies red hot swollen right outside knee is bruised,       fyi  Patient also complains of fatigue and wonders if her b12 is low patient would like to get blood work to see, but informed patient she will need a appointment to address that,

## 2022-08-01 NOTE — TELEPHONE ENCOUNTER
Spoke with patient over the phone; patient has had pain and tenderness in area of swelling near right knee, as well as mild bruising; will get venous Doppler for further evaluation of symptoms.

## 2022-08-02 ENCOUNTER — HOSPITAL ENCOUNTER (OUTPATIENT)
Dept: CARDIOLOGY | Facility: HOSPITAL | Age: 62
Discharge: HOME | End: 2022-08-02
Admitting: NURSE PRACTITIONER

## 2022-08-02 DIAGNOSIS — M79.604 PAIN IN MEDIAL RIGHT LOWER EXTREMITY: ICD-10-CM

## 2022-08-02 DIAGNOSIS — R22.41 LOCALIZED SWELLING, MASS, OR LUMP OF LOWER EXTREMITY, RIGHT: ICD-10-CM

## 2022-08-02 LAB
BH CV LOWER VASCULAR LEFT COMMON FEMORAL AUGMENT: NORMAL
BH CV LOWER VASCULAR LEFT COMMON FEMORAL COMPETENT: NORMAL
BH CV LOWER VASCULAR LEFT COMMON FEMORAL COMPRESS: NORMAL
BH CV LOWER VASCULAR LEFT COMMON FEMORAL PHASIC: NORMAL
BH CV LOWER VASCULAR LEFT COMMON FEMORAL SPONT: NORMAL
BH CV LOWER VASCULAR RIGHT COMMON FEMORAL AUGMENT: NORMAL
BH CV LOWER VASCULAR RIGHT COMMON FEMORAL COMPETENT: NORMAL
BH CV LOWER VASCULAR RIGHT COMMON FEMORAL COMPRESS: NORMAL
BH CV LOWER VASCULAR RIGHT COMMON FEMORAL PHASIC: NORMAL
BH CV LOWER VASCULAR RIGHT COMMON FEMORAL SPONT: NORMAL
BH CV LOWER VASCULAR RIGHT DISTAL FEMORAL COMPRESS: NORMAL
BH CV LOWER VASCULAR RIGHT GASTRONEMIUS COMPRESS: NORMAL
BH CV LOWER VASCULAR RIGHT GREATER SAPH AK COMPRESS: NORMAL
BH CV LOWER VASCULAR RIGHT GREATER SAPH BK COMPRESS: NORMAL
BH CV LOWER VASCULAR RIGHT LESSER SAPH COMPRESS: NORMAL
BH CV LOWER VASCULAR RIGHT MID FEMORAL AUGMENT: NORMAL
BH CV LOWER VASCULAR RIGHT MID FEMORAL COMPETENT: NORMAL
BH CV LOWER VASCULAR RIGHT MID FEMORAL COMPRESS: NORMAL
BH CV LOWER VASCULAR RIGHT MID FEMORAL PHASIC: NORMAL
BH CV LOWER VASCULAR RIGHT MID FEMORAL SPONT: NORMAL
BH CV LOWER VASCULAR RIGHT PERONEAL COMPRESS: NORMAL
BH CV LOWER VASCULAR RIGHT POPLITEAL AUGMENT: NORMAL
BH CV LOWER VASCULAR RIGHT POPLITEAL COMPETENT: NORMAL
BH CV LOWER VASCULAR RIGHT POPLITEAL COMPRESS: NORMAL
BH CV LOWER VASCULAR RIGHT POPLITEAL PHASIC: NORMAL
BH CV LOWER VASCULAR RIGHT POPLITEAL SPONT: NORMAL
BH CV LOWER VASCULAR RIGHT POSTERIOR TIBIAL COMPRESS: NORMAL
BH CV LOWER VASCULAR RIGHT PROFUNDA FEMORAL COMPRESS: NORMAL
BH CV LOWER VASCULAR RIGHT PROXIMAL FEMORAL COMPRESS: NORMAL
BH CV LOWER VASCULAR RIGHT SAPHENOFEMORAL JUNCTION COMPRESS: NORMAL
MAXIMAL PREDICTED HEART RATE: 158 BPM
STRESS TARGET HR: 134 BPM

## 2022-08-02 PROCEDURE — 93971 EXTREMITY STUDY: CPT

## 2022-08-02 NOTE — PROGRESS NOTES
Right leg venous Doppler preliminary report is negative for deep vein thrombosis. Called report to ISAAK Myers and her instructions are to give patient preliminary results.

## 2022-09-15 RX ORDER — ORPHENADRINE CITRATE 100 MG/1
TABLET, EXTENDED RELEASE ORAL
Qty: 60 TABLET | Refills: 2 | Status: SHIPPED | OUTPATIENT
Start: 2022-09-15

## 2022-09-15 NOTE — TELEPHONE ENCOUNTER
Rx Refill Note  Requested Prescriptions     Pending Prescriptions Disp Refills   • orphenadrine (NORFLEX) 100 MG 12 hr tablet [Pharmacy Med Name: ORPHENADRINE  MG TABLET] 60 tablet 2     Sig: TAKE ONE TABLET BY MOUTH TWICE A DAY AS NEEDED FOR MUSCLE SPASMS OR MILD PAIN .      Last office visit with prescribing clinician: 7/18/2022      Next office visit with prescribing clinician: Visit date not found            Gavin Arguello CMA/ROMAINE  09/15/22, 15:39 EDT

## 2022-10-25 ENCOUNTER — APPOINTMENT (OUTPATIENT)
Dept: CT IMAGING | Facility: HOSPITAL | Age: 62
End: 2022-10-25

## 2022-10-27 RX ORDER — MONTELUKAST SODIUM 10 MG/1
TABLET ORAL
Qty: 90 TABLET | Refills: 1 | Status: SHIPPED | OUTPATIENT
Start: 2022-10-27

## 2022-10-28 ENCOUNTER — OFFICE VISIT (OUTPATIENT)
Dept: FAMILY MEDICINE CLINIC | Facility: CLINIC | Age: 62
End: 2022-10-28

## 2022-10-28 VITALS
WEIGHT: 161 LBS | BODY MASS INDEX: 27.49 KG/M2 | SYSTOLIC BLOOD PRESSURE: 122 MMHG | HEART RATE: 78 BPM | OXYGEN SATURATION: 100 % | DIASTOLIC BLOOD PRESSURE: 80 MMHG | HEIGHT: 64 IN | TEMPERATURE: 98.6 F

## 2022-10-28 DIAGNOSIS — Z00.00 HEALTHCARE MAINTENANCE: Primary | ICD-10-CM

## 2022-10-28 DIAGNOSIS — Z12.31 SCREENING MAMMOGRAM, ENCOUNTER FOR: ICD-10-CM

## 2022-10-28 DIAGNOSIS — I10 ESSENTIAL HYPERTENSION: ICD-10-CM

## 2022-10-28 DIAGNOSIS — Z12.11 COLON CANCER SCREENING: ICD-10-CM

## 2022-10-28 DIAGNOSIS — R73.03 PREDIABETES: ICD-10-CM

## 2022-10-28 DIAGNOSIS — E55.9 VITAMIN D DEFICIENCY: ICD-10-CM

## 2022-10-28 DIAGNOSIS — E78.2 MIXED HYPERLIPIDEMIA: ICD-10-CM

## 2022-10-28 PROCEDURE — 99396 PREV VISIT EST AGE 40-64: CPT | Performed by: NURSE PRACTITIONER

## 2022-10-28 NOTE — PROGRESS NOTES
Flaquita Cr is here today for an annual physical exam.     Eating a healthy diet, has been cutting down on carbs. Not exercising routinely due to peripheral neuropathy.   No regular periods due to partial hysterectomy. Wears seat belt. Feels safe at home.   Sexual activity: not very active with   Birth control: surgical  Pregnancy: 5- 2 miscarriages, 3 live births  Sexual and gender orientation:    PHQ-2 Depression Screening  Little interest or pleasure in doing things? 0-->not at all   Feeling down, depressed, or hopeless? 0-->not at all   PHQ-2 Total Score 0         I have reviewed the patient's medical, family, and social history in detail and updated the computerized patient record.    Screening history:  Colonoscopy - due  Mammogram- due, Pap/pelvic - not needed anymore due to partial hysterectomy  Metabolic - due    Health Maintenance   Topic Date Due   • COVID-19 Vaccine (1) Never done   • TDAP/TD VACCINES (1 - Tdap) Never done   • ZOSTER VACCINE (1 of 2) Never done   • DXA SCAN  04/08/2011   • Pneumococcal Vaccine 0-64 (2 - PCV) 05/30/2019   • MAMMOGRAM  06/13/2020   • COLORECTAL CANCER SCREENING  08/24/2020   • ANNUAL PHYSICAL  09/26/2020   • INFLUENZA VACCINE  08/01/2022   • LIPID PANEL  07/18/2023   • HEPATITIS C SCREENING  Addressed   • PAP SMEAR  Discontinued       Review of Systems   Constitutional: Negative for activity change, appetite change, fatigue and fever.   HENT: Positive for congestion (chest). Negative for postnasal drip, rhinorrhea and sore throat.    Eyes: Negative for pain and redness.   Respiratory: Positive for cough. Negative for shortness of breath and wheezing.    Cardiovascular: Negative for chest pain, palpitations and leg swelling.   Gastrointestinal: Negative for abdominal pain, constipation, diarrhea, nausea and vomiting.   Genitourinary: Negative for difficulty urinating, flank pain and hematuria.   Musculoskeletal: Negative for arthralgias, back pain, myalgias and  "neck pain.   Neurological: Negative for dizziness, weakness, numbness and headaches.   Psychiatric/Behavioral: Negative for decreased concentration. The patient is not nervous/anxious.        /80 (BP Location: Left arm, Patient Position: Sitting, Cuff Size: Adult)   Pulse 78   Temp 98.6 °F (37 °C) (Temporal)   Ht 162.6 cm (64\")   Wt 73 kg (161 lb)   SpO2 100%   BMI 27.64 kg/m²      Physical Exam    Vital signs reviewed.  General appearance: No acute distress  Eyes: conjunctiva clear without erythema; pupils equally round and reactive  ENT: external ears and nose normal; hearing normal, oropharynx clear  Neck: supple; no thyromegaly  CV: normal rate and rhythm; no peripheral edema  Respiratory: normal respiratory effort; lungs clear to auscultation bilaterally  Abd: flat, soft, nontender; bowel sounds auscultated over all 4 quadrants  MSK: normal gait and station; no focal joint deformity or swelling  Skin: no rash or wounds; normal turgor  Neuro: cranial nerves 2-12 grossly intact; normal sensation to light touch  Psych: mood and affect normal; recent and remote memory intact    No visits with results within 2 Week(s) from this visit.   Latest known visit with results is:   Hospital Outpatient Visit on 08/02/2022   Component Date Value Ref Range Status   • Target HR (85%) 08/02/2022 134  bpm Final   • Max. Pred. HR (100%) 08/02/2022 158  bpm Final   • Right Common Femoral Spont 08/02/2022 Y   Final   • Right Common Femoral Phasic 08/02/2022 Y   Final   • Right Common Femoral Augment 08/02/2022 Y   Final   • Right Common Femoral Competent 08/02/2022 Y   Final   • Right Common Femoral Compress 08/02/2022 C   Final   • Right Saphenofemoral Junction Comp* 08/02/2022 C   Final   • Right Profunda Femoral Compress 08/02/2022 C   Final   • Right Proximal Femoral Compress 08/02/2022 C   Final   • Right Mid Femoral Spont 08/02/2022 Y   Final   • Right Mid Femoral Phasic 08/02/2022 Y   Final   • Right Mid Femoral " Augment 08/02/2022 Y   Final   • Right Mid Femoral Competent 08/02/2022 Y   Final   • Right Mid Femoral Compress 08/02/2022 C   Final   • Right Distal Femoral Compress 08/02/2022 C   Final   • Right Popliteal Spont 08/02/2022 Y   Final   • Right Popliteal Phasic 08/02/2022 Y   Final   • Right Popliteal Augment 08/02/2022 Y   Final   • Right Popliteal Competent 08/02/2022 Y   Final   • Right Popliteal Compress 08/02/2022 C   Final   • Right Posterior Tibial Compress 08/02/2022 C   Final   • Right Peroneal Compress 08/02/2022 C   Final   • Right Gastronemius Compress 08/02/2022 C   Final   • Right Greater Saph AK Compress 08/02/2022 C   Final   • Right Greater Saph BK Compress 08/02/2022 C   Final   • Right Lesser Saph Compress 08/02/2022 C   Final   • Left Common Femoral Spont 08/02/2022 Y   Final   • Left Common Femoral Phasic 08/02/2022 Y   Final   • Left Common Femoral Augment 08/02/2022 Y   Final   • Left Common Femoral Competent 08/02/2022 Y   Final   • Left Common Femoral Compress 08/02/2022 C   Final         Current Outpatient Medications:   •  Acetaminophen (TYLENOL ARTHRITIS PAIN PO), Take 650 mg by mouth 2 (Two) Times a Day As Needed., Disp: , Rfl:   •  albuterol sulfate HFA (ProAir HFA) 108 (90 Base) MCG/ACT inhaler, Inhale 2 puffs Every 4 (Four) Hours As Needed for Wheezing or Shortness of Air., Disp: 8.5 g, Rfl: 3  •  aspirin 81 MG EC tablet, TAKE 1 TABLET BY MOUTH EVERY DAY, Disp: 90 tablet, Rfl: 2  •  azelastine (ASTEPRO) 0.15 % solution nasal spray, 1 spray into the nostril(s) as directed by provider Daily., Disp: , Rfl:   •  celecoxib (CeleBREX) 200 MG capsule, TAKE 1 CAPSULE BY MOUTH TWICE A DAY, Disp: 180 capsule, Rfl: 1  •  cetirizine (ZyrTEC Allergy) 10 MG tablet, Take 1 tablet by mouth Daily., Disp: 180 tablet, Rfl: 1  •  Cholecalciferol (VITAMIN D) 50 MCG (2000 UT) tablet, Take 2,000 Units by mouth Daily., Disp: 30 tablet, Rfl: 5  •  clopidogrel (PLAVIX) 75 MG tablet, TAKE 1 TABLET BY MOUTH  EVERY DAY, Disp: 90 tablet, Rfl: 2  •  [employer] Digestive Probiotic 250 MG capsule, TAKE 1 CAPSULE BY MOUTH EVERY DAY, Disp: 30 capsule, Rfl: 1  •  diphenhydrAMINE (BENADRYL) 25 mg capsule, Take 25 mg by mouth Every 6 (Six) Hours As Needed for Itching., Disp: , Rfl:   •  flunisolide (NASALIDE) 25 MCG/ACT (0.025%) solution nasal spray, SPRAY 2 SPRAYS EVERY 12 HOURS AS DIRECTED, Disp: , Rfl:   •  fluticasone (FLONASE) 50 MCG/ACT nasal spray, 2 sprays into the nostril(s) as directed by provider Daily., Disp: 3 bottle, Rfl: 3  •  Fluticasone-Umeclidin-Vilant (TRELEGY) 100-62.5-25 MCG/INH inhaler, Inhale 1 puff., Disp: , Rfl:   •  guaiFENesin (Mucinex) 600 MG 12 hr tablet, Take 2 tablets by mouth 2 (Two) Times a Day., Disp: 60 tablet, Rfl: 5  •  montelukast (SINGULAIR) 10 MG tablet, TAKE 1 TABLET BY MOUTH EVERYDAY AT BEDTIME, Disp: 90 tablet, Rfl: 1  •  MULTIPLE VITAMIN PO, Take 1 tablet by mouth Daily., Disp: , Rfl:   •  Omega-3 Fatty Acids (OMEGA 3 PO), Take  by mouth Daily. 2 caps daily, Disp: , Rfl:   •  orphenadrine (NORFLEX) 100 MG 12 hr tablet, TAKE ONE TABLET BY MOUTH TWICE A DAY AS NEEDED FOR MUSCLE SPASMS OR MILD PAIN ., Disp: 60 tablet, Rfl: 2  •  Turmeric (QC TUMERIC COMPLEX PO), Take  by mouth Daily. 2 caps daily, Disp: , Rfl:     Diagnoses and all orders for this visit:    1. Healthcare maintenance (Primary)  -     CBC & Differential  -     Comprehensive Metabolic Panel  -     Hemoglobin A1c  -     TSH Rfx On Abnormal To Free T4  -     Vitamin D,25-Hydroxy  -     Lipid Panel  -     Vitamin B12  -     Folate    2. Colon cancer screening  -     Ambulatory Referral For Screening Colonoscopy    3. Screening mammogram, encounter for  -     Mammo Screening Digital Tomosynthesis Bilateral With CAD; Future    4. Vitamin D deficiency  -     Vitamin D,25-Hydroxy    5. Mixed hyperlipidemia  -     Lipid Panel    6. Essential hypertension  -     Comprehensive Metabolic Panel    7. Prediabetes  -     Hemoglobin A1c    Labs  ordered per routine physical examination. If labs come back abnormal, will call patient to discuss results and further treatment plan.   Mammogram ordered for breast cancer screening.  Order for referral for colonoscopy for colon cancer screening placed.    Encourage healthy diet and exercise.  Encourage patient to stay up to date on screening examinations as indicated based on age and risk factors. F/U yearly.

## 2022-10-29 LAB
25(OH)D3+25(OH)D2 SERPL-MCNC: 49.3 NG/ML (ref 30–100)
ALBUMIN SERPL-MCNC: 4.6 G/DL (ref 3.8–4.8)
ALBUMIN/GLOB SERPL: 1.5 {RATIO} (ref 1.2–2.2)
ALP SERPL-CCNC: 76 IU/L (ref 44–121)
ALT SERPL-CCNC: 14 IU/L (ref 0–32)
AST SERPL-CCNC: 21 IU/L (ref 0–40)
BASOPHILS # BLD AUTO: 0 X10E3/UL (ref 0–0.2)
BASOPHILS NFR BLD AUTO: 0 %
BILIRUB SERPL-MCNC: 0.3 MG/DL (ref 0–1.2)
BUN SERPL-MCNC: 13 MG/DL (ref 8–27)
BUN/CREAT SERPL: 14 (ref 12–28)
CALCIUM SERPL-MCNC: 9.8 MG/DL (ref 8.7–10.3)
CHLORIDE SERPL-SCNC: 101 MMOL/L (ref 96–106)
CHOLEST SERPL-MCNC: 382 MG/DL (ref 100–199)
CO2 SERPL-SCNC: 23 MMOL/L (ref 20–29)
CREAT SERPL-MCNC: 0.92 MG/DL (ref 0.57–1)
EGFRCR SERPLBLD CKD-EPI 2021: 70 ML/MIN/1.73
EOSINOPHIL # BLD AUTO: 0.1 X10E3/UL (ref 0–0.4)
EOSINOPHIL NFR BLD AUTO: 1 %
ERYTHROCYTE [DISTWIDTH] IN BLOOD BY AUTOMATED COUNT: 14.2 % (ref 11.7–15.4)
FOLATE SERPL-MCNC: 13.1 NG/ML
GLOBULIN SER CALC-MCNC: 3 G/DL (ref 1.5–4.5)
GLUCOSE SERPL-MCNC: 89 MG/DL (ref 70–99)
HBA1C MFR BLD: 5.6 % (ref 4.8–5.6)
HCT VFR BLD AUTO: 43.8 % (ref 34–46.6)
HDLC SERPL-MCNC: 46 MG/DL
HGB BLD-MCNC: 14.3 G/DL (ref 11.1–15.9)
IMM GRANULOCYTES # BLD AUTO: 0 X10E3/UL (ref 0–0.1)
IMM GRANULOCYTES NFR BLD AUTO: 0 %
LABORATORY COMMENT REPORT: ABNORMAL
LDLC SERPL CALC-MCNC: 277 MG/DL (ref 0–99)
LYMPHOCYTES # BLD AUTO: 2 X10E3/UL (ref 0.7–3.1)
LYMPHOCYTES NFR BLD AUTO: 22 %
MCH RBC QN AUTO: 29.5 PG (ref 26.6–33)
MCHC RBC AUTO-ENTMCNC: 32.6 G/DL (ref 31.5–35.7)
MCV RBC AUTO: 90 FL (ref 79–97)
MONOCYTES # BLD AUTO: 0.8 X10E3/UL (ref 0.1–0.9)
MONOCYTES NFR BLD AUTO: 8 %
NEUTROPHILS # BLD AUTO: 6.4 X10E3/UL (ref 1.4–7)
NEUTROPHILS NFR BLD AUTO: 69 %
PLATELET # BLD AUTO: 378 X10E3/UL (ref 150–450)
POTASSIUM SERPL-SCNC: 4.4 MMOL/L (ref 3.5–5.2)
PROT SERPL-MCNC: 7.6 G/DL (ref 6–8.5)
RBC # BLD AUTO: 4.85 X10E6/UL (ref 3.77–5.28)
SODIUM SERPL-SCNC: 141 MMOL/L (ref 134–144)
TRIGL SERPL-MCNC: 269 MG/DL (ref 0–149)
TSH SERPL DL<=0.005 MIU/L-ACNC: 2.24 UIU/ML (ref 0.45–4.5)
VIT B12 SERPL-MCNC: 1604 PG/ML (ref 232–1245)
VLDLC SERPL CALC-MCNC: 59 MG/DL (ref 5–40)
WBC # BLD AUTO: 9.4 X10E3/UL (ref 3.4–10.8)

## 2022-11-08 ENCOUNTER — HOSPITAL ENCOUNTER (OUTPATIENT)
Dept: MAMMOGRAPHY | Facility: HOSPITAL | Age: 62
Discharge: HOME | End: 2022-11-08
Admitting: NURSE PRACTITIONER

## 2022-11-08 DIAGNOSIS — Z12.31 SCREENING MAMMOGRAM, ENCOUNTER FOR: ICD-10-CM

## 2022-11-08 PROCEDURE — 77063 BREAST TOMOSYNTHESIS BI: CPT

## 2022-11-08 PROCEDURE — 77067 SCR MAMMO BI INCL CAD: CPT

## 2022-12-08 ENCOUNTER — HOSPITAL ENCOUNTER (OUTPATIENT)
Dept: CT IMAGING | Facility: HOSPITAL | Age: 62
Discharge: HOME | End: 2022-12-08
Admitting: INTERNAL MEDICINE

## 2022-12-08 DIAGNOSIS — Z12.2 ENCOUNTER FOR SCREENING FOR LUNG CANCER: ICD-10-CM

## 2022-12-08 PROCEDURE — 71271 CT THORAX LUNG CANCER SCR C-: CPT

## 2023-05-15 ENCOUNTER — TRANSCRIBE ORDERS (OUTPATIENT)
Dept: ADMINISTRATIVE | Facility: HOSPITAL | Age: 63
End: 2023-05-15
Payer: COMMERCIAL

## 2023-05-15 DIAGNOSIS — I65.23 CAROTID STENOSIS, ASYMPTOMATIC, BILATERAL: Primary | ICD-10-CM

## 2023-05-15 RX ORDER — ORPHENADRINE CITRATE 100 MG/1
TABLET, EXTENDED RELEASE ORAL
Qty: 60 TABLET | Refills: 2 | Status: SHIPPED | OUTPATIENT
Start: 2023-05-15

## 2023-06-05 RX ORDER — CLOPIDOGREL BISULFATE 75 MG/1
75 TABLET ORAL DAILY
Qty: 90 TABLET | Refills: 2 | OUTPATIENT
Start: 2023-06-05

## 2023-06-08 RX ORDER — CLOPIDOGREL BISULFATE 75 MG/1
TABLET ORAL
Qty: 90 TABLET | Refills: 2 | OUTPATIENT
Start: 2023-06-08

## 2023-06-08 NOTE — TELEPHONE ENCOUNTER
When you get a chance, can you schedule this pt with any APRN. She was a Dr. Higgins pt and is just 2 months shy of being a new patient. Her last visit with us was Aug 2020.    Thanks,  Nuzhat

## 2023-06-12 ENCOUNTER — HOSPITAL ENCOUNTER (OUTPATIENT)
Dept: CT IMAGING | Facility: HOSPITAL | Age: 63
Discharge: HOME | End: 2023-06-12
Admitting: SURGERY
Payer: COMMERCIAL

## 2023-06-12 DIAGNOSIS — I65.23 CAROTID STENOSIS, ASYMPTOMATIC, BILATERAL: ICD-10-CM

## 2023-06-12 PROCEDURE — 70496 CT ANGIOGRAPHY HEAD: CPT

## 2023-06-12 PROCEDURE — 82565 ASSAY OF CREATININE: CPT

## 2023-06-12 PROCEDURE — 70498 CT ANGIOGRAPHY NECK: CPT

## 2023-06-12 PROCEDURE — 25510000001 IOPAMIDOL 61 % SOLUTION: Performed by: SURGERY

## 2023-06-12 RX ADMIN — IOPAMIDOL 100 ML: 612 INJECTION, SOLUTION INTRAVENOUS at 11:22

## 2023-06-13 LAB — CREAT BLDA-MCNC: 0.9 MG/DL (ref 0.6–1.3)

## 2023-09-01 NOTE — PROGRESS NOTES
Only one adenoma, change colon recall to 5 yrs.  Star xifaxan 550mg po tid for 14 days, #42, 2 rf  Ov NP 6-8 weeks Yes...

## 2023-09-21 ENCOUNTER — TELEPHONE (OUTPATIENT)
Dept: FAMILY MEDICINE CLINIC | Facility: CLINIC | Age: 63
End: 2023-09-21

## 2023-09-21 ENCOUNTER — OFFICE VISIT (OUTPATIENT)
Dept: FAMILY MEDICINE CLINIC | Facility: CLINIC | Age: 63
End: 2023-09-21
Payer: COMMERCIAL

## 2023-09-21 VITALS
DIASTOLIC BLOOD PRESSURE: 76 MMHG | HEIGHT: 64 IN | OXYGEN SATURATION: 98 % | BODY MASS INDEX: 26.8 KG/M2 | HEART RATE: 76 BPM | WEIGHT: 157 LBS | SYSTOLIC BLOOD PRESSURE: 144 MMHG

## 2023-09-21 DIAGNOSIS — H53.8 BLURRY VISION, BILATERAL: ICD-10-CM

## 2023-09-21 DIAGNOSIS — G43.709 CHRONIC MIGRAINE WITHOUT AURA WITHOUT STATUS MIGRAINOSUS, NOT INTRACTABLE: ICD-10-CM

## 2023-09-21 DIAGNOSIS — I25.10 CORONARY ARTERY DISEASE INVOLVING NATIVE CORONARY ARTERY OF NATIVE HEART WITHOUT ANGINA PECTORIS: Primary | ICD-10-CM

## 2023-09-21 DIAGNOSIS — I25.10 CORONARY ARTERY DISEASE INVOLVING NATIVE CORONARY ARTERY OF NATIVE HEART WITHOUT ANGINA PECTORIS: ICD-10-CM

## 2023-09-21 DIAGNOSIS — I73.9 PAD (PERIPHERAL ARTERY DISEASE): ICD-10-CM

## 2023-09-21 PROCEDURE — 99214 OFFICE O/P EST MOD 30 MIN: CPT | Performed by: NURSE PRACTITIONER

## 2023-09-21 RX ORDER — CLOPIDOGREL BISULFATE 75 MG/1
75 TABLET ORAL DAILY
Qty: 90 TABLET | Refills: 1 | Status: SHIPPED | OUTPATIENT
Start: 2023-09-21

## 2023-09-21 RX ORDER — TOPIRAMATE 25 MG/1
25 TABLET ORAL DAILY
Qty: 30 TABLET | Refills: 2 | Status: SHIPPED | OUTPATIENT
Start: 2023-09-21 | End: 2023-09-21 | Stop reason: SDUPTHER

## 2023-09-21 RX ORDER — CLOPIDOGREL BISULFATE 75 MG/1
75 TABLET ORAL DAILY
Qty: 90 TABLET | Refills: 1 | Status: SHIPPED | OUTPATIENT
Start: 2023-09-21 | End: 2023-09-21 | Stop reason: SDUPTHER

## 2023-09-21 RX ORDER — TOPIRAMATE 25 MG/1
25 TABLET ORAL DAILY
Qty: 30 TABLET | Refills: 2 | Status: SHIPPED | OUTPATIENT
Start: 2023-09-21

## 2023-09-21 NOTE — PROGRESS NOTES
"Chief Complaint  Headache (Chronic for a few months off an on /Tension from back of neck ) and Med Refill (Referral for cardio , vascular & neurologist for headaches )    Subjective        Flaquita Cr presents to [facility]  History of Present Illness  Patient is here to discuss getting referrals. She needs a refill for plavix as well as a new referral to cardiology. Would also like to get lab tests today.     She also would like a referral for a new vascular specialist. She would like a second opinion. She sees them for pad and post aortic bypass surgery.     She is currently having a persistent and chronic daily headaches for about 2 months. She does have some light sensitivity. Does have history of allergy symptoms. Currently does have some clear nasal drainage at times. History of headaches but nothing this persistent to date. Had a prescription for nasal imitrex in the past but does not remember how helpful it was. Does take daily nasal and oral allergy medications.     Intermittent blurry vision and some blank spots in her vision which resolve on their own. She would like a referral to ophthalmology.   Objective   Vital Signs:  /76 (BP Location: Left arm, Patient Position: Sitting, Cuff Size: Adult)   Pulse 76   Ht 162.6 cm (64\")   Wt 71.2 kg (157 lb)   SpO2 98%   BMI 26.95 kg/m²   Estimated body mass index is 26.95 kg/m² as calculated from the following:    Height as of this encounter: 162.6 cm (64\").    Weight as of this encounter: 71.2 kg (157 lb).             Physical Exam  Constitutional:       Appearance: Normal appearance.   HENT:      Head: Normocephalic.   Eyes:      General:         Right eye: Discharge present.         Left eye: Discharge present.     Extraocular Movements: Extraocular movements intact.      Pupils: Pupils are equal, round, and reactive to light.   Cardiovascular:      Rate and Rhythm: Normal rate and regular rhythm.   Pulmonary:      " Effort: Pulmonary effort is normal.      Breath sounds: Normal breath sounds.   Musculoskeletal:         General: Normal range of motion.      Cervical back: Normal range of motion.   Skin:     General: Skin is warm and dry.   Neurological:      General: No focal deficit present.      Mental Status: She is alert and oriented to person, place, and time.   Psychiatric:         Mood and Affect: Mood normal.      Result Review :                   Assessment and Plan   Diagnoses and all orders for this visit:    1. Coronary artery disease involving native coronary artery of native heart without angina pectoris (Primary)  -     clopidogrel (PLAVIX) 75 MG tablet; Take 1 tablet by mouth Daily.  Dispense: 90 tablet; Refill: 1  -     CBC w AUTO Differential  -     Comprehensive metabolic panel  -     Lipid panel  -     TSH Rfx On Abnormal To Free T4  -     Ambulatory Referral to Cardiology    2. Chronic migraine without aura without status migrainosus, not intractable  -     topiramate (TOPAMAX) 25 MG tablet; Take 1 tablet by mouth Daily.  Dispense: 30 tablet; Refill: 2  -     CT Head Without Contrast; Future    3. PAD (peripheral artery disease)  -     Ambulatory Referral to Vascular Surgery    4. Blurry vision, bilateral  -     Ambulatory Referral to Ophthalmology             Follow Up   Return in 4 weeks (on 10/19/2023), or if symptoms worsen or fail to improve.  Patient was given instructions and counseling regarding her condition or for health maintenance advice. Please see specific information pulled into the AVS if appropriate.

## 2023-09-22 LAB
ALBUMIN SERPL-MCNC: 4.5 G/DL (ref 3.5–5.2)
ALBUMIN/GLOB SERPL: 1.6 G/DL
ALP SERPL-CCNC: 102 U/L (ref 39–117)
ALT SERPL-CCNC: 20 U/L (ref 1–33)
AST SERPL-CCNC: 22 U/L (ref 1–32)
BASOPHILS # BLD AUTO: 0.04 10*3/MM3 (ref 0–0.2)
BASOPHILS NFR BLD AUTO: 0.4 % (ref 0–1.5)
BILIRUB SERPL-MCNC: 0.2 MG/DL (ref 0–1.2)
BUN SERPL-MCNC: 24 MG/DL (ref 8–23)
BUN/CREAT SERPL: 26.4 (ref 7–25)
CALCIUM SERPL-MCNC: 10 MG/DL (ref 8.6–10.5)
CHLORIDE SERPL-SCNC: 102 MMOL/L (ref 98–107)
CHOLEST SERPL-MCNC: 329 MG/DL (ref 0–200)
CO2 SERPL-SCNC: 24.6 MMOL/L (ref 22–29)
CREAT SERPL-MCNC: 0.91 MG/DL (ref 0.57–1)
EGFRCR SERPLBLD CKD-EPI 2021: 71 ML/MIN/1.73
EOSINOPHIL # BLD AUTO: 0.12 10*3/MM3 (ref 0–0.4)
EOSINOPHIL NFR BLD AUTO: 1.3 % (ref 0.3–6.2)
ERYTHROCYTE [DISTWIDTH] IN BLOOD BY AUTOMATED COUNT: 14.5 % (ref 12.3–15.4)
GLOBULIN SER CALC-MCNC: 2.8 GM/DL
GLUCOSE SERPL-MCNC: 91 MG/DL (ref 65–99)
HCT VFR BLD AUTO: 41.6 % (ref 34–46.6)
HDLC SERPL-MCNC: 56 MG/DL (ref 40–60)
HGB BLD-MCNC: 14.2 G/DL (ref 12–15.9)
IMM GRANULOCYTES # BLD AUTO: 0.04 10*3/MM3 (ref 0–0.05)
IMM GRANULOCYTES NFR BLD AUTO: 0.4 % (ref 0–0.5)
LDLC SERPL CALC-MCNC: 240 MG/DL (ref 0–100)
LYMPHOCYTES # BLD AUTO: 1.74 10*3/MM3 (ref 0.7–3.1)
LYMPHOCYTES NFR BLD AUTO: 19.5 % (ref 19.6–45.3)
MCH RBC QN AUTO: 31.1 PG (ref 26.6–33)
MCHC RBC AUTO-ENTMCNC: 34.1 G/DL (ref 31.5–35.7)
MCV RBC AUTO: 91.2 FL (ref 79–97)
MONOCYTES # BLD AUTO: 0.62 10*3/MM3 (ref 0.1–0.9)
MONOCYTES NFR BLD AUTO: 7 % (ref 5–12)
NEUTROPHILS # BLD AUTO: 6.35 10*3/MM3 (ref 1.7–7)
NEUTROPHILS NFR BLD AUTO: 71.4 % (ref 42.7–76)
NRBC BLD AUTO-RTO: 0 /100 WBC (ref 0–0.2)
PLATELET # BLD AUTO: 323 10*3/MM3 (ref 140–450)
POTASSIUM SERPL-SCNC: 4.9 MMOL/L (ref 3.5–5.2)
PROT SERPL-MCNC: 7.3 G/DL (ref 6–8.5)
RBC # BLD AUTO: 4.56 10*6/MM3 (ref 3.77–5.28)
SODIUM SERPL-SCNC: 139 MMOL/L (ref 136–145)
TRIGL SERPL-MCNC: 172 MG/DL (ref 0–150)
TSH SERPL DL<=0.005 MIU/L-ACNC: 2.54 UIU/ML (ref 0.27–4.2)
VLDLC SERPL CALC-MCNC: 33 MG/DL (ref 5–40)
WBC # BLD AUTO: 8.91 10*3/MM3 (ref 3.4–10.8)

## 2023-09-25 ENCOUNTER — TELEPHONE (OUTPATIENT)
Dept: FAMILY MEDICINE CLINIC | Facility: CLINIC | Age: 63
End: 2023-09-25

## 2023-09-25 RX ORDER — CELECOXIB 200 MG/1
200 CAPSULE ORAL 2 TIMES DAILY
Qty: 180 CAPSULE | Refills: 1 | Status: SHIPPED | OUTPATIENT
Start: 2023-09-25

## 2023-09-25 RX ORDER — ORPHENADRINE CITRATE 100 MG/1
TABLET, EXTENDED RELEASE ORAL
Qty: 60 TABLET | Refills: 2 | Status: SHIPPED | OUTPATIENT
Start: 2023-09-25

## 2023-09-25 RX ORDER — MONTELUKAST SODIUM 10 MG/1
10 TABLET ORAL
Qty: 90 TABLET | Refills: 1 | Status: SHIPPED | OUTPATIENT
Start: 2023-09-25

## 2023-09-25 NOTE — TELEPHONE ENCOUNTER
Name: GENARO ESCOBAR    Relationship:SELF     Best Callback Number: [phone]     HUB PROVIDED THE RELAY MESSAGE FROM THE OFFICE    PATIENT HAS FURTHER QUESTIONS AND WOULD LIKE A CALL BACK AT THE FOLLOWING PHONE NUMBER [phone]    ADDITIONAL INFORMATION: PATIENT STATES SHE DOESN'T WANT TO TAKE A STATIN DRUG.

## 2023-09-25 NOTE — TELEPHONE ENCOUNTER
I tried to call and speak with the patient but did not get an answer. I left a message for her to call back or send us a [place] message with any further questions she had.

## 2023-09-25 NOTE — TELEPHONE ENCOUNTER
Relay    LMFCB    Let this patient know all labs except cholesterol are fine. Cholesterol is still very elevated, only slightly improved from last check. Can you ask this patient why she is not on cholesterol lowering medication?

## 2023-09-25 NOTE — TELEPHONE ENCOUNTER
Caller: Flaquita Cr    Relationship: Self    Best call back number: [phone]     What medication are you requesting: ANTIBIOTIC    What are your current symptoms: COUGH, YELLOW SINUS CONGESTION, EAR CONGESTION    How long have you been experiencing symptoms:     Have you had these symptoms before:    [x] Yes  [] No    Have you been treated for these symptoms before:   [x] Yes  [] No    If a prescription is needed, what is your preferred pharmacy and phone number: [facility] [id number] - [address] - [phone]  - [phone] FX     Additional notes:PLEASE CALL AND ADVISE.

## 2023-09-25 NOTE — TELEPHONE ENCOUNTER
Caller: Shaye Flaquita E    Relationship: Self    Best call back number: [phone]     Requested Prescriptions:   Requested Prescriptions     Pending Prescriptions Disp Refills    orphenadrine (NORFLEX) 100 MG 12 hr tablet 60 tablet 2    celecoxib (CeleBREX) 200 MG capsule 180 capsule 1     Sig: Take 1 capsule by mouth 2 (Two) Times a Day.    montelukast (SINGULAIR) 10 MG tablet 90 tablet 1     Sig: Take 1 tablet by mouth every night at bedtime.        Pharmacy where request should be sent: [facility] [id number] [address] - [phone]  - [phone] FX     Last office visit with prescribing clinician: Visit date not found   Last telemedicine visit with prescribing clinician: Visit date not found   Next office visit with prescribing clinician: Visit date not found     Additional details provided by patient:     Does the patient have less than a 3 day supply:  [] Yes  [] No    Would you like a call back once the refill request has been completed: [] Yes [] No    If the office needs to give you a call back, can they leave a voicemail: [] Yes [] No    April Onelia Smith Rep   09/25/23 12:37 EDT

## 2023-09-25 NOTE — TELEPHONE ENCOUNTER
OKAY FOR HUB TO RELAY    I tried calling the patient but did not get an answe. I left a brief message for her to call back. If she calls back please let her know:    You prescription has been sent to Dr Garza to sign off on. Once he signs off on it, it should go to your pharmacy. If you need anything else please let us know.

## 2023-09-25 NOTE — TELEPHONE ENCOUNTER
OK FOR HUB TO RELAY    PATIENT WILL NEED TO MAKE AN APPT FOR IS ADVISED TO GO TO URGENT CARE. IF PATIENT IS HAVING TROUBLE BREATHING OR HAVING CHEST PAINS PLEASE GO TO ER

## 2023-09-26 ENCOUNTER — OFFICE VISIT (OUTPATIENT)
Dept: FAMILY MEDICINE CLINIC | Facility: CLINIC | Age: 63
End: 2023-09-26
Payer: COMMERCIAL

## 2023-09-26 VITALS
OXYGEN SATURATION: 94 % | SYSTOLIC BLOOD PRESSURE: 122 MMHG | WEIGHT: 159 LBS | HEART RATE: 90 BPM | DIASTOLIC BLOOD PRESSURE: 78 MMHG | BODY MASS INDEX: 27.14 KG/M2 | TEMPERATURE: 98.3 F | HEIGHT: 64 IN

## 2023-09-26 DIAGNOSIS — L70.9 ACNE, UNSPECIFIED ACNE TYPE: ICD-10-CM

## 2023-09-26 DIAGNOSIS — R30.9 PAINFUL URINATION: Primary | ICD-10-CM

## 2023-09-26 DIAGNOSIS — J01.10 ACUTE NON-RECURRENT FRONTAL SINUSITIS: ICD-10-CM

## 2023-09-26 DIAGNOSIS — R05.1 ACUTE COUGH: ICD-10-CM

## 2023-09-26 LAB
BILIRUB BLD-MCNC: ABNORMAL MG/DL
CLARITY, POC: CLEAR
COLOR UR: YELLOW
EXPIRATION DATE: ABNORMAL
EXPIRATION DATE: NORMAL
FLUAV AG UPPER RESP QL IA.RAPID: NOT DETECTED
FLUBV AG UPPER RESP QL IA.RAPID: NOT DETECTED
GLUCOSE UR STRIP-MCNC: NEGATIVE MG/DL
INTERNAL CONTROL: NORMAL
KETONES UR QL: NEGATIVE
LEUKOCYTE EST, POC: NEGATIVE
Lab: ABNORMAL
Lab: NORMAL
NITRITE UR-MCNC: NEGATIVE MG/ML
PH UR: 5.5 [PH] (ref 5–8)
PROT UR STRIP-MCNC: ABNORMAL MG/DL
RBC # UR STRIP: NEGATIVE /UL
SARS-COV-2 AG UPPER RESP QL IA.RAPID: NOT DETECTED
SP GR UR: 1.02 (ref 1–1.03)
UROBILINOGEN UR QL: NORMAL

## 2023-09-26 PROCEDURE — 81003 URINALYSIS AUTO W/O SCOPE: CPT | Performed by: NURSE PRACTITIONER

## 2023-09-26 PROCEDURE — 87428 SARSCOV & INF VIR A&B AG IA: CPT | Performed by: NURSE PRACTITIONER

## 2023-09-26 PROCEDURE — 99214 OFFICE O/P EST MOD 30 MIN: CPT | Performed by: NURSE PRACTITIONER

## 2023-09-26 RX ORDER — SPIRONOLACTONE 25 MG/1
25 TABLET ORAL DAILY
Qty: 30 TABLET | Refills: 2 | Status: SHIPPED | OUTPATIENT
Start: 2023-09-26

## 2023-09-26 RX ORDER — AMOXICILLIN AND CLAVULANATE POTASSIUM 875; 125 MG/1; MG/1
1 TABLET, FILM COATED ORAL 2 TIMES DAILY
Qty: 14 TABLET | Refills: 0 | Status: SHIPPED | OUTPATIENT
Start: 2023-09-26

## 2023-09-26 NOTE — PROGRESS NOTES
"Chief Complaint  Nasal Congestion (Was seen last Thursday 9/21/2023) and Difficulty Urinating    Subjective        Flaquita Cr presents to [facility]  History of Present Illness  Patient is here to discuss yellow nasal drainage and ear fullness, post nasal drainage, coughing. She does admit to these symptoms being ultimately related to allergies but is concerned for an infection.     She is taking topiramate since last Thursday. She has taken everyday but one. The headaches seem to worsen during the day, not sure if related to light.     She did go to eye doctor and that dr seems to think iheadaches might be related to her eyes so that provider would like a copy of the ct scan results, she has an appointment for that this Friday. She does mild have bilateral cataracts. Was told to use drops and otc therapies for dry eye.     Patient is also concerned for facial skin changes including acne. States has worsened since menopause started, and she never had acne before menopause. Denies major problem with hot flashes but does have some mild increasing incidence of facial hair.     Some discomfort with urination but denies fever or chills, or urinary odor, frequency.       Objective   Vital Signs:  /78 (BP Location: Left arm, Patient Position: Sitting, Cuff Size: Adult)   Pulse 90   Temp 98.3 °F (36.8 °C)   Ht 162.6 cm (64\")   Wt 72.1 kg (159 lb)   SpO2 94%   BMI 27.29 kg/m²   Estimated body mass index is 27.29 kg/m² as calculated from the following:    Height as of this encounter: 162.6 cm (64\").    Weight as of this encounter: 72.1 kg (159 lb).               Physical Exam  Constitutional:       Appearance: Normal appearance.   HENT:      Head: Normocephalic.      Right Ear: Tympanic membrane is erythematous.      Left Ear: Tympanic membrane is erythematous.   Eyes:      Extraocular Movements: Extraocular movements intact.      Pupils: Pupils are equal, round, and reactive " to light.   Cardiovascular:      Rate and Rhythm: Normal rate and regular rhythm.   Pulmonary:      Effort: Pulmonary effort is normal.      Breath sounds: Normal breath sounds.   Musculoskeletal:         General: Normal range of motion.      Cervical back: Normal range of motion.   Skin:     General: Skin is warm and dry.   Neurological:      General: No focal deficit present.      Mental Status: She is alert and oriented to person, place, and time.   Psychiatric:         Mood and Affect: Mood normal.      Result Review :                   Assessment and Plan   Diagnoses and all orders for this visit:    1. Painful urination (Primary)  -     POCT urinalysis dipstick, automated    2. Acute cough  -     POCT SARS-CoV-2 Antigen MARY + Flu    3. Acute non-recurrent frontal sinusitis  -     amoxicillin-clavulanate (AUGMENTIN) 875-125 MG per tablet; Take 1 tablet by mouth 2 (Two) Times a Day.  Dispense: 14 tablet; Refill: 0    4. Acne, unspecified acne type  -     spironolactone (Aldactone) 25 MG tablet; Take 1 tablet by mouth Daily.  Dispense: 30 tablet; Refill: 2      Patient is advised to drink more water and return if worsening urinary pain or upper respiratory symptoms worsen.        Follow Up   Return if symptoms worsen or fail to improve.  Patient was given instructions and counseling regarding her condition or for health maintenance advice. Please see specific information pulled into the AVS if appropriate.

## 2023-10-26 ENCOUNTER — OFFICE VISIT (OUTPATIENT)
Dept: FAMILY MEDICINE CLINIC | Facility: CLINIC | Age: 63
End: 2023-10-26
Payer: COMMERCIAL

## 2023-10-26 VITALS
HEIGHT: 64 IN | OXYGEN SATURATION: 96 % | BODY MASS INDEX: 27.83 KG/M2 | DIASTOLIC BLOOD PRESSURE: 80 MMHG | TEMPERATURE: 98 F | SYSTOLIC BLOOD PRESSURE: 130 MMHG | HEART RATE: 76 BPM | WEIGHT: 163 LBS

## 2023-10-26 DIAGNOSIS — I73.9 PAD (PERIPHERAL ARTERY DISEASE): ICD-10-CM

## 2023-10-26 DIAGNOSIS — L70.9 ACNE, UNSPECIFIED ACNE TYPE: Primary | ICD-10-CM

## 2023-10-26 DIAGNOSIS — G43.709 CHRONIC MIGRAINE WITHOUT AURA WITHOUT STATUS MIGRAINOSUS, NOT INTRACTABLE: ICD-10-CM

## 2023-10-26 RX ORDER — ZINC GLUCONATE 50 MG
3 TABLET ORAL 3 TIMES WEEKLY
COMMUNITY

## 2023-10-26 RX ORDER — CILOSTAZOL 50 MG/1
50 TABLET ORAL 2 TIMES DAILY
COMMUNITY
Start: 2023-10-10

## 2023-10-26 RX ORDER — LIFITEGRAST 50 MG/ML
1 SOLUTION/ DROPS OPHTHALMIC 2 TIMES DAILY
COMMUNITY
Start: 2023-09-25

## 2023-10-26 RX ORDER — LEVOCETIRIZINE DIHYDROCHLORIDE 5 MG/1
5 TABLET, FILM COATED ORAL EVERY EVENING
COMMUNITY

## 2023-10-26 NOTE — PROGRESS NOTES
"Chief Complaint  acne and Headache    Subjective        Flaquita Cr presents to [facility]  History of Present Illness  Patient states she was sent to [facility] and was diagnosed with cataracts. She was denied by insurance for cataract removal. She feels the new glasses they prescribed to help is improving the headaches slightly. She was taking the topamax 3-4 days and felt like she was 'loopy' and would 'crawling on the floor'. She feels the eye doctor has helped enough for her to tolerate the headaches so she defers further treatment at this time.     She also never took the spironolactone for acne. She read the side effects and was concerned.     She saw new vascular specialist. She discussed her hesitance with taking cholesterol lowering meds. He discussed lifestyle changes for patimontserrat to work on. He also told her to build up her exercise tolerance by frequent exercise. Also put her on pletal. Some increased loose stool and dizziness after starting new medication. She has a history of aortic-femoral bypass.    Has an appointment with cardiologist in November.     Objective   Vital Signs:  /80 (BP Location: Left arm, Patient Position: Sitting, Cuff Size: Adult)   Pulse 76   Temp 98 °F (36.7 °C) (Temporal)   Ht 162.6 cm (64\")   Wt 73.9 kg (163 lb)   SpO2 96%   BMI 27.98 kg/m²   Estimated body mass index is 27.98 kg/m² as calculated from the following:    Height as of this encounter: 162.6 cm (64\").    Weight as of this encounter: 73.9 kg (163 lb).               Physical Exam  Constitutional:       Appearance: Normal appearance.   HENT:      Head: Normocephalic.   Eyes:      Extraocular Movements: Extraocular movements intact.      Pupils: Pupils are equal, round, and reactive to light.   Cardiovascular:      Rate and Rhythm: Normal rate and regular rhythm.   Pulmonary:      Effort: Pulmonary effort is normal.      Breath sounds: Normal breath sounds. "   Musculoskeletal:         General: Normal range of motion.      Cervical back: Normal range of motion.   Skin:     General: Skin is warm and dry.   Neurological:      General: No focal deficit present.      Mental Status: She is alert and oriented to person, place, and time.   Psychiatric:         Mood and Affect: Mood normal.        Result Review :                   Assessment and Plan   Diagnoses and all orders for this visit:    1. Acne, unspecified acne type (Primary)    2. Chronic migraine without aura without status migrainosus, not intractable    3. PAD (peripheral artery disease)    No new therapies ordered; patient is to follow up 3 months for lab only for repeat blood tests.          Follow Up   Return in about 6 months (around 4/26/2024) for Recheck.  Patient was given instructions and counseling regarding her condition or for health maintenance advice. Please see specific information pulled into the AVS if appropriate.

## 2023-11-28 ENCOUNTER — OFFICE VISIT (OUTPATIENT)
Dept: CARDIOLOGY | Facility: CLINIC | Age: 63
End: 2023-11-28
Payer: COMMERCIAL

## 2023-11-28 VITALS
HEIGHT: 64 IN | HEART RATE: 98 BPM | WEIGHT: 166.4 LBS | DIASTOLIC BLOOD PRESSURE: 70 MMHG | BODY MASS INDEX: 28.41 KG/M2 | SYSTOLIC BLOOD PRESSURE: 130 MMHG

## 2023-11-28 DIAGNOSIS — E78.2 MIXED HYPERLIPIDEMIA: ICD-10-CM

## 2023-11-28 DIAGNOSIS — Z72.0 TOBACCO ABUSE: ICD-10-CM

## 2023-11-28 DIAGNOSIS — I65.21 CAROTID STENOSIS, ASYMPTOMATIC, RIGHT: ICD-10-CM

## 2023-11-28 DIAGNOSIS — I25.10 CORONARY ARTERY DISEASE INVOLVING NATIVE CORONARY ARTERY OF NATIVE HEART WITHOUT ANGINA PECTORIS: Primary | ICD-10-CM

## 2023-11-28 PROCEDURE — 99204 OFFICE O/P NEW MOD 45 MIN: CPT | Performed by: INTERNAL MEDICINE

## 2023-11-28 PROCEDURE — 93000 ELECTROCARDIOGRAM COMPLETE: CPT | Performed by: INTERNAL MEDICINE

## 2023-11-28 NOTE — PROGRESS NOTES
[facility]      Patient Name: Flaquita Cr  :1960  Age: 63 y.o.  Encounter Provider:  Claudy Ahuja Jr, MD      Chief Complaint:   Chief Complaint   Patient presents with    Coronary artery disease involving native coronary artery of         HPI  Flaquita Cr is a 63 y.o. female past medical history of coronary artery disease without angina, significant peripheral arterial disease followed by vascular surgery, ongoing nicotine use with e-cigarette presents for initial evaluation with me.  Previously followed by Dr. Higgins and I have reviewed all pertinent electronic health records.  Last seen in clinic in 2020.  She was initially diagnosed with coronary artery disease in 2020 at which time she was found to have critical disease of the RCA secondary status post PCI.  She did well with procedure and was on atorvastatin at the time but declined to continue on the medication.  She has known carotid stenosis on the right per CTA neck 2023.  She follows with Dr. Fraser and has history of aortic bifemoral bypass with severe stenosis of both lower extremity bypass grafts.  Per his last note she also has severe stenosis of the celiac artery.  Her latest ABIs are significantly decreased but she is currently trying to avoid recurrent intervention.  She has severely elevated cholesterol with total cholesterol of 329 and LDL of 240 with triglycerides 172.  When asked about lipid-lowering medications she states that she has researched this extensively and is adamantly against statin or other lipid-lowering medications due to her family history of dementia.  When the option of PCSK9 inhibitor was discussed she states she was against more medications at this time.  Claudication limits physical activity.  No angina.  No orthopnea, PND or edema.  No palpitations, dizziness or syncope.  No cardiac complaints at time of interview.      The following portions of the patient's  "history were reviewed and updated as appropriate: allergies, current medications, past family history, past medical history, past social history, past surgical history and problem list.      Review of Systems   Constitutional: Negative for chills and fever.   HENT:  Negative for hoarse voice and sore throat.    Eyes:  Negative for double vision and photophobia.   Cardiovascular:  Positive for claudication. Negative for chest pain, leg swelling, near-syncope, orthopnea, palpitations, paroxysmal nocturnal dyspnea and syncope.   Respiratory:  Negative for cough and wheezing.    Skin:  Negative for poor wound healing and rash.   Musculoskeletal:  Negative for arthritis and joint swelling.   Gastrointestinal:  Negative for bloating, abdominal pain, hematemesis and hematochezia.   Neurological:  Negative for dizziness and focal weakness.   Psychiatric/Behavioral:  Negative for depression and suicidal ideas.        OBJECTIVE:   Vital Signs  Vitals:    11/28/23 1359   BP: 130/70   Pulse: 98     Estimated body mass index is 28.56 kg/m² as calculated from the following:    Height as of this encounter: 162.6 cm (64\").    Weight as of this encounter: 75.5 kg (166 lb 6.4 oz).    Vitals reviewed.   Constitutional:       Appearance: Not in distress. Chronically ill-appearing.   HENT:         Comments: Right carotid bruit is noted  Neck:      Vascular: No JVR. JVD normal.   Pulmonary:      Effort: Pulmonary effort is normal.      Breath sounds: Normal breath sounds. No wheezing. No rhonchi. No rales.   Chest:      Chest wall: Not tender to palpatation.   Cardiovascular:      PMI at left midclavicular line. Normal rate. Regular rhythm. Normal S1. Normal S2.       Murmurs: There is no murmur.      No gallop.  No click. No rub.   Pulses:     Decreased pulses.   Edema:     Peripheral edema absent.   Abdominal:      General: Bowel sounds are normal.      Palpations: Abdomen is soft.      Tenderness: There is no abdominal tenderness. "   Musculoskeletal: Normal range of motion.         General: No tenderness. Skin:     General: Skin is warm and dry.   Neurological:      General: No focal deficit present.      Mental Status: Alert and oriented to person, place and time.         ECG 12 Lead    Date/Time: 11/28/2023 2:05 PM  Performed by: Claudy Ahuja Jr., MD    Authorized by: Claudy Ahuja Jr., MD  Comparison: compared with previous ECG from 8/4/2020  Similar to previous ECG  Rhythm: sinus rhythm  Conduction: right bundle branch block  Other findings: left atrial abnormality    Clinical impression: abnormal EKG      Lipid Panel          9/21/2023    10:53   Lipid Panel   Total Cholesterol 329    Triglycerides 172    HDL Cholesterol 56    VLDL Cholesterol 33    LDL Cholesterol  240         BUN   Date Value Ref Range Status   09/21/2023 24 (H) 8 - 23 mg/dL Final   06/27/2020 19 8 - 23 mg/dL Final     Creatinine   Date Value Ref Range Status   09/21/2023 0.91 0.57 - 1.00 mg/dL Final   06/12/2023 0.90 0.60 - 1.30 mg/dL Final     Comment:     Serial Number: [id number]:  [id number]     Potassium   Date Value Ref Range Status   09/21/2023 4.9 3.5 - 5.2 mmol/L Final   06/27/2020 4.1 3.5 - 5.2 mmol/L Final     ALT (SGPT)   Date Value Ref Range Status   09/21/2023 20 1 - 33 U/L Final   06/24/2020 23 1 - 33 U/L Final     AST (SGOT)   Date Value Ref Range Status   09/21/2023 22 1 - 32 U/L Final   06/24/2020 25 1 - 32 U/L Final           ASSESSMENT:     63-year-old female with history of tobacco abuse, CAD without angina, carotid stenosis, severe peripheral arterial disease status post aortobifemoral bypass who presents for initial evaluation      PLAN OF CARE:     CAD -no angina.  Continue Plavix.  She vehemently declines any lipid-lowering strategies other than diet and exercise which will certainly not get her to goal.  I explained this all to her but she states she is not ready to commit to any therapy at this time.  Follow clinical progress.  Severe PAD  -restenosis of aortobifemoral grafts, severe stenosis of celiac arteries and have any percent stenosis of right carotid artery.  Bruit is clear.  She follows with vascular surgery.  Mixed hyperlipidemia -see discussion above  Tobacco abuse -counseled on need for cessation and abstinent from nicotine products.  Patient is not willing to quit at this time.    Return to clinic 6 months             Discharge Medications            Accurate as of November 28, 2023  2:05 PM. If you have any questions, ask your nurse or doctor.                Continue These Medications        Instructions Start Date   albuterol sulfate  (90 Base) MCG/ACT inhaler  Commonly known as: ProAir HFA   2 puffs, Inhalation, Every 4 Hours PRN      azelastine 0.15 % solution nasal spray  Commonly known as: ASTEPRO   1 spray, Nasal, Daily      celecoxib 200 MG capsule  Commonly known as: CeleBREX   200 mg, Oral, 2 Times Daily      cilostazol 50 MG tablet  Commonly known as: PLETAL   50 mg, Oral, 2 Times Daily      clopidogrel 75 MG tablet  Commonly known as: PLAVIX   75 mg, Oral, Daily      diphenhydrAMINE 25 mg capsule  Commonly known as: BENADRYL   25 mg, Oral, Every 6 Hours PRN      fluticasone 50 MCG/ACT nasal spray  Commonly known as: FLONASE   2 sprays, Nasal, Daily      Fluticasone-Umeclidin-Vilant 100-62.5-25 MCG/INH inhaler  Commonly known as: TRELEGY   1 puff, Inhalation      guaiFENesin 600 MG 12 hr tablet  Commonly known as: Mucinex   1,200 mg, Oral, 2 Times Daily      levocetirizine 5 MG tablet  Commonly known as: XYZAL   5 mg, Oral, Every Evening      montelukast 10 MG tablet  Commonly known as: SINGULAIR   10 mg, Oral, Every Night at Bedtime      OMEGA 3 PO   Oral, Daily, 2 caps daily       orphenadrine 100 MG 12 hr tablet  Commonly known as: NORFLEX   Take 1 tablet twice a day as needed for pain.      spironolactone 25 MG tablet  Commonly known as: Aldactone   25 mg, Oral, Daily      TYLENOL ARTHRITIS PAIN PO   650 mg, Oral, 2  Times Daily PRN      vitamin D3 125 MCG (5000 UT) capsule capsule   5,000 Units, Oral, Daily      Vitamin D 50 MCG (2000 UT) tablet   2,000 Units, Oral, Daily      Xiidra 5 % ophthalmic solution  Generic drug: Lifitegrast   1 drop, Both Eyes, 2 Times Daily      Zinc 50 MG tablet   3 tablets, Oral, 3 Times Weekly               Thank you for allowing me to participate in the care of your patient,      Sincerely,   Claudy Ahuja MD  [facility]  11/28/23  14:05 EST

## 2023-12-21 ENCOUNTER — TELEPHONE (OUTPATIENT)
Dept: FAMILY MEDICINE CLINIC | Facility: CLINIC | Age: 63
End: 2023-12-21

## 2023-12-21 DIAGNOSIS — F17.210 CIGARETTE NICOTINE DEPENDENCE WITHOUT COMPLICATION: Primary | ICD-10-CM

## 2023-12-21 RX ORDER — VARENICLINE TARTRATE 1 MG/1
1 TABLET, FILM COATED ORAL 2 TIMES DAILY
Qty: 56 TABLET | Refills: 3 | Status: SHIPPED | OUTPATIENT
Start: 2024-01-18

## 2023-12-21 RX ORDER — VARENICLINE TARTRATE 0.5 (11)-1
KIT ORAL
Qty: 1 EACH | Refills: 0 | Status: SHIPPED | OUTPATIENT
Start: 2023-12-21 | End: 2024-01-18

## 2023-12-21 NOTE — TELEPHONE ENCOUNTER
Caller: Flaquita Cr    Relationship to patient: Self    Best call back number:     Patient is needing: PATIENT STATES SHE WAS REFERRED TO A VASCULAR SURGEON AND HE WANTS TO DO SURGERY ON HER LEG(S).   SHE STATES THE VASCULAR SURGEON WANTS HER TO STOP NICOTINE/VAPING.       PATIENT IS ASKING IF DR. GUARDADO WILL PRESCRIBE HER CHANTIX TO HELP HER STOP NICOTINE.  PLEASE ADVISE.

## 2024-01-04 PROBLEM — I65.23 INTERNAL CAROTID ARTERY STENOSIS, BILATERAL: Status: ACTIVE | Noted: 2024-01-04

## 2024-01-29 ENCOUNTER — TELEPHONE (OUTPATIENT)
Dept: FAMILY MEDICINE CLINIC | Facility: CLINIC | Age: 64
End: 2024-01-29
Payer: COMMERCIAL

## 2024-01-29 DIAGNOSIS — I73.9 PAD (PERIPHERAL ARTERY DISEASE): Primary | ICD-10-CM

## 2024-01-29 RX ORDER — CILOSTAZOL 50 MG/1
50 TABLET ORAL 2 TIMES DAILY
Qty: 60 TABLET | Refills: 5 | Status: SHIPPED | OUTPATIENT
Start: 2024-01-29

## 2024-01-29 NOTE — TELEPHONE ENCOUNTER
Caller: Flaquita Cr    Relationship: Self    Best call back number: [phone]    What medication are you requesting: cilostazol (PLETAL) 50 MG tablet    If a prescription is needed, what is your preferred pharmacy and phone number: [facility] [id number] - [address] - [phone]  - [phone]     Additional notes: PATIENT STATED THE VASCULAR DOCTOR HAD PRESCRIBED THIS BLOOD THINNER MEDICATION.    SHE STATED THAT DOCTOR IS QUITTING, AND SHE WOULD LIKE TO KNOW IF DR GUARDADO WOULD SEND IN A MEDICATION REFILL.    PLEASE CALL.

## 2024-01-30 NOTE — TELEPHONE ENCOUNTER
I called and spoke with Flaquita and let her know he had sent the pletal into the pharmacy for her. I let her know he sent it to [facility] in [facility] which she confirmed was okay. She will call back if she needs anything else.

## 2024-02-12 ENCOUNTER — TELEPHONE (OUTPATIENT)
Dept: FAMILY MEDICINE CLINIC | Facility: CLINIC | Age: 64
End: 2024-02-12
Payer: COMMERCIAL

## 2024-02-12 ENCOUNTER — TELEPHONE (OUTPATIENT)
Dept: CARDIOLOGY | Facility: CLINIC | Age: 64
End: 2024-02-12
Payer: COMMERCIAL

## 2024-06-17 RX ORDER — ORPHENADRINE CITRATE 100 MG/1
TABLET, EXTENDED RELEASE ORAL
Qty: 60 TABLET | Refills: 2 | OUTPATIENT
Start: 2024-06-17

## 2024-08-13 ENCOUNTER — OFFICE VISIT (OUTPATIENT)
Dept: FAMILY MEDICINE CLINIC | Facility: CLINIC | Age: 64
End: 2024-08-13
Payer: COMMERCIAL

## 2024-08-13 VITALS
TEMPERATURE: 98 F | HEIGHT: 64 IN | SYSTOLIC BLOOD PRESSURE: 120 MMHG | HEART RATE: 89 BPM | WEIGHT: 180 LBS | BODY MASS INDEX: 30.73 KG/M2 | DIASTOLIC BLOOD PRESSURE: 60 MMHG | OXYGEN SATURATION: 93 %

## 2024-08-13 DIAGNOSIS — J20.9 ACUTE BRONCHITIS, UNSPECIFIED ORGANISM: Primary | ICD-10-CM

## 2024-08-13 DIAGNOSIS — G89.29 CHRONIC MIDLINE LOW BACK PAIN WITHOUT SCIATICA: ICD-10-CM

## 2024-08-13 DIAGNOSIS — M54.50 CHRONIC MIDLINE LOW BACK PAIN WITHOUT SCIATICA: ICD-10-CM

## 2024-08-13 DIAGNOSIS — R10.84 ABDOMINAL DISCOMFORT, GENERALIZED: ICD-10-CM

## 2024-08-13 DIAGNOSIS — M51.36 DDD (DEGENERATIVE DISC DISEASE), LUMBAR: ICD-10-CM

## 2024-08-13 DIAGNOSIS — J45.21 MILD INTERMITTENT ASTHMA WITH ACUTE EXACERBATION: ICD-10-CM

## 2024-08-13 PROCEDURE — 99214 OFFICE O/P EST MOD 30 MIN: CPT | Performed by: NURSE PRACTITIONER

## 2024-08-13 RX ORDER — ALBUTEROL SULFATE 90 UG/1
2 AEROSOL, METERED RESPIRATORY (INHALATION) EVERY 4 HOURS PRN
Qty: 18 G | Refills: 1 | Status: SHIPPED | OUTPATIENT
Start: 2024-08-13

## 2024-08-13 RX ORDER — AMOXICILLIN AND CLAVULANATE POTASSIUM 875; 125 MG/1; MG/1
1 TABLET, FILM COATED ORAL 2 TIMES DAILY
Qty: 14 TABLET | Refills: 0 | Status: SHIPPED | OUTPATIENT
Start: 2024-08-13 | End: 2024-08-20

## 2024-08-13 RX ORDER — PREDNISONE 20 MG/1
TABLET ORAL
Qty: 7 TABLET | Refills: 0 | Status: SHIPPED | OUTPATIENT
Start: 2024-08-13 | End: 2024-08-18

## 2024-08-13 RX ORDER — ORPHENADRINE CITRATE 100 MG/1
TABLET, EXTENDED RELEASE ORAL
Qty: 60 TABLET | Refills: 2 | Status: SHIPPED | OUTPATIENT
Start: 2024-08-13

## 2024-08-13 NOTE — PROGRESS NOTES
Subjective   Flaquita Cr is a 64 y.o. female.     Chief Complaint   Patient presents with    Cough     Chest congestion X 3 weeks     Diarrhea     Abdomen pain       History of Present Illness   Patient presents with c/o cough and chest congestion x3 weeks; started with back pain about 3-4 weeks ago and then had onset of cough; once started coughing, back pain improved; has had nonproductive cough; some SOA after coughing episodes; mild nausea, no vomiting; no ear pain or sore throat; spouse has been sick; has been using Albuterol inhaler as needed and helps; also takes Trelegy, Montelukast, and Xyzal daily; has been taking Mucinex daily for last couple of weeks but not sure has helped much; has also tried Delsym and Nyquil and have helped; back muscles and bottom ribs feel sore due to coughing; needs refill of Albuterol inhaler.    Has also had diarrhea and abdominal pain; was having sharp pains in left lower abdomen; symptoms started about 1 week ago; has had some chills; has felt tired since has not eaten much; has been eating broth and jello, but still having diarrhea; had eggs today and no diarrhea so far today; abdomen pain has improved at this point; has been taking electrolyte tablet due to diarrhea; last urinated about 1 hour ago; urine some darker, but no decreased urine output.    Takes Orphenadrine twice daily as needed for back pain and helps; typically only takes Orphenadrine nightly; has chronic midline lower back pain that radiates to bilateral hips; no radiation of pain down legs; no bladder or bowel dysfunction; needs refill.          The following portions of the patient's history were reviewed and updated as appropriate: allergies, current medications, past family history, past medical history, past social history, past surgical history and problem list.    Current Outpatient Medications on File Prior to Visit   Medication Sig    Acetaminophen (TYLENOL ARTHRITIS PAIN PO) Take 650 mg by  mouth 2 (Two) Times a Day As Needed.    cetirizine (zyrTEC) 10 MG tablet Take 1 tablet by mouth Daily.    cilostazol (PLETAL) 50 MG tablet Take 1 tablet by mouth 2 (Two) Times a Day.    clopidogrel (PLAVIX) 75 MG tablet Take 1 tablet by mouth Daily.    fluticasone (FLONASE) 50 MCG/ACT nasal spray 2 sprays into the nostril(s) as directed by provider Daily.    Fluticasone-Umeclidin-Vilant (TRELEGY) 100-62.5-25 MCG/INH inhaler Inhale 1 puff.    guaiFENesin (Mucinex) 600 MG 12 hr tablet Take 2 tablets by mouth 2 (Two) Times a Day.    levocetirizine (XYZAL) 5 MG tablet Take 1 tablet by mouth Every Evening.    montelukast (SINGULAIR) 10 MG tablet Take 1 tablet by mouth every night at bedtime.    Omega-3 Fatty Acids (OMEGA 3 PO) Take  by mouth Daily. 2 caps daily    Probiotic Product (PROBIOTIC PO) Take 1 tablet by mouth Daily. Delayed release    varenicline (Chantix Continuing Month Thierno) 1 MG tablet Take 1 tablet by mouth 2 (Two) Times a Day.    vitamin D3 125 MCG (5000 UT) capsule capsule Take 1 capsule by mouth Daily.    azelastine (ASTEPRO) 0.15 % solution nasal spray 1 spray into the nostril(s) as directed by provider Daily. (Patient not taking: Reported on 8/13/2024)    celecoxib (CeleBREX) 200 MG capsule Take 1 capsule by mouth 2 (Two) Times a Day. (Patient not taking: Reported on 8/13/2024)    diphenhydrAMINE (BENADRYL) 25 mg capsule Take 1 capsule by mouth Every 6 (Six) Hours As Needed for Itching. (Patient not taking: Reported on 8/13/2024)    FLUNISOLIDE NA 2 (Two) Times a Day. (Patient not taking: Reported on 8/13/2024)    Xiidra 5 % ophthalmic solution Administer 1 drop to both eyes 2 (Two) Times a Day. (Patient not taking: Reported on 8/13/2024)    Zinc 50 MG tablet Take 3 tablets by mouth 3 (Three) Times a Week. (Patient not taking: Reported on 8/13/2024)     No current facility-administered medications on file prior to visit.        Past Medical History:   Diagnosis Date    Allergy     Allergies     Arthritis     failed naproxen and meloxicam - celebrex works well    Asthma     Atherosclerosis of native arteries of extremities with intermittent claudication, bilateral legs 08/11/2020    Atherosclerotic heart disease of native coronary artery without angina pectoris     Bilateral carotid artery stenosis 05/15/2023    Chest pain     Chronic interstitial cystitis     Circulation problem     Colon polyp     COPD (chronic obstructive pulmonary disease)     does not use O2    COVID-19 virus infection 02/03/2022    Degenerative disc disease, lumbar     Depression     Diverticulosis of large intestine without perforation or abscess without bleeding     Emphysema of lung     Fatigue     Fibromyalgia     Gastritis     GERD (gastroesophageal reflux disease)     w/o esophagits    Hyperlipidemia     unspecified    Hypertension     just started, no meds    IBS (irritable bowel syndrome)     without diarrhea    Low back pain     Major depressive disorder, single episode, unspecified     OAB (overactive bladder)     Other specified abnormal findings of blood chemistry     Other specified disorders of bone density and structure, unspecified site     Ovarian cyst 1989    Overactive bladder     PAD (peripheral artery disease)     Peripheral arterial disease     Prediabetes     Presence of coronary angioplasty implant and graft     PVD (peripheral vascular disease) 03/07/2016    other specified    Radiculopathy, lumbar region     Right leg pain     Sleep apnea     unspecified    Spinal stenosis     Tobacco abuse     Unspecified asthma with (acute) exacerbation     Vitamin D deficiency, unspecified        Past Surgical History:   Procedure Laterality Date    AORTA FEMORAL BYPASS  10/25/2011    CARDIAC CATHETERIZATION N/A 06/26/2020    Procedure: Stent OSIRIS coronary;  Surgeon: David Higgins MD;  Location: [facility] INVASIVE LOC[facility];  Service: Cardiology;  Laterality: N/A;    CARDIAC CATHETERIZATION N/A 06/26/2020     Procedure: Coronary angiography;  Surgeon: David Higgins MD;  Location:  [facility];  Service: Cardiology;  Laterality: N/A;    CARDIAC CATHETERIZATION N/A 06/26/2020    Procedure: Aortic root aortogram;  Surgeon: David Higgins MD;  Location:  [facility];  Service: Cardiology;  Laterality: N/A;    CARDIAC CATHETERIZATION N/A 06/26/2020    Procedure: Percutaneous Coronary Intervention;  Surgeon: David Higgins MD;  Location:  [facility];  Service: Cardiology;  Laterality: N/A;    COLONOSCOPY N/A 08/24/2017    NBIH, diverticulosis, four 5 to 6 mm polyps (one tubular adenoma)    EPIDURAL BLOCK      HYSTERECTOMY  1988    endometriosis    LEG SURGERY Bilateral     for PAD    MOUTH SURGERY      OTHER SURGICAL HISTORY  09/2011    AIF poss PTA/stent    OTHER SURGICAL HISTORY      CT angiofram of the head and neck    ROOT CANAL      R upper jaw with rootcanal       Family History   Problem Relation Age of Onset    Osteoporosis Mother     Atrial fibrillation Mother         pacemaker    Arthritis Mother     Alzheimer's disease Mother     Stroke Mother     Macular degeneration Father     Cancer Father     Arthritis Sister     Clotting disorder Sister     Gout Brother     Arthritis Brother     Colon cancer Maternal Aunt     Uterine cancer Maternal Aunt     Cancer Other     Other Other         blood clots, bleeding tendency    Cancer Other     Breast cancer Neg Hx        Social History     Socioeconomic History    Marital status:     Number of children: 3    Years of education: 12   Tobacco Use    Smoking status: Every Day     Types: Cigarettes     Start date: 1970    Smokeless tobacco: Never    Tobacco comments:     Began smoking at age 10.  Smoked 1 ppd for 15 years, 2 ppd for 18 years, and 2.5 ppd for 15 years for an 88.5 pack year history. 8/13/24  No longer using e-cig, smoking at least half pack cigarettes daily    Vaping Use    Vaping status:  "Never Used   Substance and Sexual Activity    Alcohol use: Yes     Comment: once per year, holidays/ CAFFEINE USE: 2 CUPS COFFEE DAILY    Drug use: No    Sexual activity: Defer       Review of Systems   Constitutional:  Positive for fatigue. Negative for unexpected weight gain and unexpected weight loss. Appetite change: some decrease with current illness, see HPI. Fever: see HPI.  HENT:  Negative for postnasal drip, sinus pressure and trouble swallowing.    Eyes:  Blurred vision: no acute change in vision, had recent cataract surgery.   Respiratory:  Positive for cough. Negative for chest tightness and shortness of breath (see HPI). Wheezing: some since has been sick.   Cardiovascular:  Negative for chest pain, palpitations and leg swelling.   Gastrointestinal:  Abdominal pain: see HPI. Diarrhea: see HPI.   Genitourinary:  Negative for dysuria.   Musculoskeletal:  Back pain: see HPI.   Skin:  Negative for rash.   Neurological:  Negative for dizziness, light-headedness and headache (had bad headache one day since has been sick).       Objective   Vitals:    08/13/24 1307 08/13/24 1347   BP: 152/80 120/60   BP Location: Left arm    Patient Position: Sitting    Cuff Size: Adult    Pulse: 89    Temp: 98 °F (36.7 °C)    SpO2: 93%    Weight: 81.6 kg (180 lb)    Height: 162.6 cm (64\")      Body mass index is 30.9 kg/m².    Physical Exam  Vitals and nursing note reviewed.   Constitutional:       General: She is not in acute distress.     Appearance: She is well-developed and well-groomed. She is not diaphoretic.   HENT:      Head: Normocephalic.      Right Ear: External ear normal. No decreased hearing noted. Right ear middle ear effusion: TM dull. Tympanic membrane is not erythematous.      Left Ear: External ear normal. No decreased hearing noted. Left ear middle ear effusion: TM dull. Tympanic membrane is not erythematous.      Nose: Nose normal.      Right Sinus: No maxillary sinus tenderness or frontal sinus " tenderness.      Left Sinus: No maxillary sinus tenderness or frontal sinus tenderness.      Mouth/Throat:      Mouth: Mucous membranes are moist.      Pharynx: No oropharyngeal exudate or posterior oropharyngeal erythema.   Eyes:      Conjunctiva/sclera: Conjunctivae normal.   Cardiovascular:      Rate and Rhythm: Normal rate and regular rhythm.      Pulses: Normal pulses.      Heart sounds: Normal heart sounds. No murmur heard.  Pulmonary:      Effort: Pulmonary effort is normal. No accessory muscle usage or respiratory distress.      Breath sounds: Decreased air movement present. No wheezing or rales.      Comments: Deep congested cough  Abdominal:      General: Bowel sounds are normal.      Palpations: Abdomen is soft. There is no hepatomegaly or splenomegaly.      Tenderness: There is no abdominal tenderness. There is no guarding.   Musculoskeletal:      Cervical back: Normal range of motion and neck supple. No bony tenderness.      Thoracic back: No bony tenderness.      Lumbar back: No bony tenderness.      Right lower leg: No edema.      Left lower leg: No edema.   Lymphadenopathy:      Cervical: No cervical adenopathy.   Skin:     General: Skin is warm and dry.      Findings: No rash.   Neurological:      Mental Status: She is alert and oriented to person, place, and time.      Gait: Abnormal gait: guarded gait.   Psychiatric:         Mood and Affect: Mood normal.         Behavior: Behavior normal.         Thought Content: Thought content normal.         Cognition and Memory: Cognition normal.         Judgment: Judgment normal.         Lab Results   Component Value Date    WBC 8.91 09/21/2023    RBC 4.56 09/21/2023    HGB 14.2 09/21/2023    HCT 41.6 09/21/2023    MCV 91.2 09/21/2023    MCH 31.1 09/21/2023    MCHC 34.1 09/21/2023    RDW 14.5 09/21/2023    RDWSD 47.0 06/26/2020    MPV 9.8 06/26/2020     09/21/2023    NEUTRORELPCT 71.4 09/21/2023    LYMPHORELPCT 19.5 (L) 09/21/2023    MONORELPCT 7.0  09/21/2023    EOSRELPCT 1.3 09/21/2023    BASORELPCT 0.4 09/21/2023    AUTOIGPER 69.6 03/12/2018    NEUTROABS 6.35 09/21/2023    LYMPHSABS 1.74 09/21/2023    MONOSABS 0.62 09/21/2023    EOSABS 0.12 09/21/2023    BASOSABS 0.04 09/21/2023    AUTOIGNUM 4.00 03/12/2018    NRBC 0.0 09/21/2023     Lab Results   Component Value Date    GLUCOSE 91 09/21/2023    BUN 24 (H) 09/21/2023    CREATININE 0.91 09/21/2023    EGFRIFNONA 51 (L) 11/17/2021    EGFRIFAFRI 59 (L) 11/17/2021    BCR 26.4 (H) 09/21/2023    K 4.9 09/21/2023    CO2 24.6 09/21/2023    CALCIUM 10.0 09/21/2023    PROTENTOTREF 7.3 09/21/2023    ALBUMIN 4.5 09/21/2023    LABIL2 1.6 09/21/2023    AST 22 09/21/2023    ALT 20 09/21/2023      Lab Results   Component Value Date    CHLPL 329 (H) 09/21/2023    TRIG 172 (H) 09/21/2023    HDL 56 09/21/2023    VLDL 33 09/21/2023     (H) 09/21/2023     Lab Results   Component Value Date    TSH 2.540 09/21/2023     Lab Results   Component Value Date    HGBA1C 5.6 10/28/2022     Lab Results   Component Value Date    COLORU Yellow 09/26/2023    CLARITYU Clear 09/26/2023    LEUKOCYTESUR Negative 09/26/2023    BILIRUBINUR Moderate (2+) (A) 09/26/2023          Assessment    Problem List Items Addressed This Visit       Acute bronchitis - Primary    Current Assessment & Plan     Increase intake of clear liquids and rest.  Continue plain Mucinex to thin secretions.  Continue Albuterol inhaler as needed for cough or shortness of breath.         Relevant Medications    albuterol sulfate HFA (ProAir HFA) 108 (90 Base) MCG/ACT inhaler    amoxicillin-clavulanate (AUGMENTIN) 875-125 MG per tablet    Chronic back pain    Relevant Medications    orphenadrine (NORFLEX) 100 MG 12 hr tablet    Asthma    Current Assessment & Plan     Continue Albuterol inhaler as needed for cough or shortness of breath.         Relevant Medications    albuterol sulfate HFA (ProAir HFA) 108 (90 Base) MCG/ACT inhaler    predniSONE (DELTASONE) 20 MG tablet     DDD (degenerative disc disease), lumbar    Relevant Medications    orphenadrine (NORFLEX) 100 MG 12 hr tablet     Other Visit Diagnoses       Abdominal discomfort, generalized                 Return in about 1 month (around 9/13/2024) for Annual physical, Recheck.or sooner if symptoms persist or worsen.  Discussed possible recent diverticulitis; will send Rx for Augmentin for chest congestion and possible recent diverticulitis; will get chest x-ray if symptoms persist or worsen.     I spent 34 minutes caring for Flaquita on this date of service. This time includes time spent by me in the following activities:reviewing tests, obtaining and/or reviewing a separately obtained history, performing a medically appropriate examination and/or evaluation , counseling and educating the patient/family/caregiver, ordering medications, tests, or procedures, and documenting information in the medical record    COVID-19 Precautions - Patient was compliant in wearing a mask. When I saw the patient, I used appropriate personal protective equipment (PPE) including N95 mask, gloves, and eye shield (standard procedure).  Hand hygiene was completed before and after seeing the patient.

## 2024-08-13 NOTE — PATIENT INSTRUCTIONS
Increase intake of clear liquids and rest.  Continue plain Mucinex to thin secretions.  Continue Albuterol inhaler as needed for cough or shortness of breath.  Follow up if symptoms persist or worsen.  Schedule an appointment for physical with fasting labs.

## 2024-08-15 PROBLEM — H25.9 AGE-RELATED CATARACT OF BOTH EYES: Status: ACTIVE | Noted: 2024-02-07

## 2024-08-15 PROBLEM — J20.9 ACUTE BRONCHITIS: Status: ACTIVE | Noted: 2017-03-18

## 2024-08-15 PROBLEM — G60.9 IDIOPATHIC NEUROPATHY: Status: ACTIVE | Noted: 2024-08-15

## 2024-08-16 ENCOUNTER — TELEPHONE (OUTPATIENT)
Dept: FAMILY MEDICINE CLINIC | Facility: CLINIC | Age: 64
End: 2024-08-16

## 2024-08-16 NOTE — ASSESSMENT & PLAN NOTE
Increase intake of clear liquids and rest.  Continue plain Mucinex to thin secretions.  Continue Albuterol inhaler as needed for cough or shortness of breath.

## 2024-08-20 ENCOUNTER — TELEPHONE (OUTPATIENT)
Dept: FAMILY MEDICINE CLINIC | Facility: CLINIC | Age: 64
End: 2024-08-20
Payer: COMMERCIAL

## 2024-08-20 NOTE — TELEPHONE ENCOUNTER
Patients spouse called and said if she was not feeling better some meds would be called in for her?  I asked if her chest xray was done as ordered and she has not done it.  Please call her at her number.  [phone].

## 2024-08-26 ENCOUNTER — TELEPHONE (OUTPATIENT)
Dept: FAMILY MEDICINE CLINIC | Facility: CLINIC | Age: 64
End: 2024-08-26
Payer: COMMERCIAL

## 2024-08-26 DIAGNOSIS — R05.1 ACUTE COUGH: ICD-10-CM

## 2024-08-26 DIAGNOSIS — R19.7 DIARRHEA, UNSPECIFIED TYPE: Primary | ICD-10-CM

## 2024-08-26 DIAGNOSIS — J20.9 ACUTE BRONCHITIS, UNSPECIFIED ORGANISM: ICD-10-CM

## 2024-08-26 DIAGNOSIS — J01.90 ACUTE NON-RECURRENT SINUSITIS, UNSPECIFIED LOCATION: ICD-10-CM

## 2024-08-26 RX ORDER — AZITHROMYCIN 250 MG/1
TABLET, FILM COATED ORAL
Qty: 6 TABLET | Refills: 0 | Status: SHIPPED | OUTPATIENT
Start: 2024-08-26

## 2024-08-27 NOTE — TELEPHONE ENCOUNTER
Patient called office with c/o persistent symptoms; pt will think she is getting better and then will feel worse again; pt has had temp 99 with chills at times; has also had head congestion with headache and productive cough; pt will have some SOA with coughing episodes; will use Albuterol inhaler as needed and helps; has also been taking Mucinex and Nyquil; pt has had decreased appetite; diarrhea had resolved yesterday and had formed BM, then diarrhea started again today; pt will have diarrhea several times per day; instructed to increase intake of clear liquids and rest; will send Rx for Azithromycin; will get chest x-ray and labs with stool studies for further evaluation.

## 2024-09-05 ENCOUNTER — TELEPHONE (OUTPATIENT)
Dept: FAMILY MEDICINE CLINIC | Facility: CLINIC | Age: 64
End: 2024-09-05
Payer: COMMERCIAL

## 2024-09-05 ENCOUNTER — HOSPITAL ENCOUNTER (OUTPATIENT)
Dept: GENERAL RADIOLOGY | Facility: HOSPITAL | Age: 64
Discharge: HOME | End: 2024-09-05
Admitting: NURSE PRACTITIONER
Payer: COMMERCIAL

## 2024-09-05 DIAGNOSIS — R05.1 ACUTE COUGH: ICD-10-CM

## 2024-09-05 PROCEDURE — 71046 X-RAY EXAM CHEST 2 VIEWS: CPT

## 2024-09-05 NOTE — TELEPHONE ENCOUNTER
The patient came in for her labs. She wanted to ask Jazmin if there was anyway she could order a CT Scan from her waist down. Please advise.

## 2024-09-06 DIAGNOSIS — R10.819 LOWER ABDOMINAL TENDERNESS: Primary | ICD-10-CM

## 2024-09-06 DIAGNOSIS — R19.7 DIARRHEA, UNSPECIFIED TYPE: ICD-10-CM

## 2024-09-06 NOTE — TELEPHONE ENCOUNTER
Spoke with patient over the phone; patient's cough and congestion is better; cough seems looser and patient has been able to have more of a productive cough; reviewed lab results with patient; pending chest x-ray; patient has had a lot of allergy symptoms with increased sneezing; patient has been taking Xyzal daily; instructed to resume Flonase and Azelastine daily and see if helps; patient will go couple days without diarrhea and then will start back again; pending stool studies; will get CT abdomen/pelvis for further evaluation; will also refer to GI; instructed to schedule a follow-up appointment if symptoms persist or worsen.

## 2024-09-12 DIAGNOSIS — J45.21 MILD INTERMITTENT ASTHMA WITH ACUTE EXACERBATION: ICD-10-CM

## 2024-09-12 DIAGNOSIS — I25.10 CORONARY ARTERY DISEASE INVOLVING NATIVE CORONARY ARTERY OF NATIVE HEART WITHOUT ANGINA PECTORIS: ICD-10-CM

## 2024-09-12 RX ORDER — CLOPIDOGREL BISULFATE 75 MG/1
75 TABLET ORAL DAILY
Qty: 90 TABLET | Refills: 0 | Status: SHIPPED | OUTPATIENT
Start: 2024-09-12

## 2024-09-12 RX ORDER — MONTELUKAST SODIUM 10 MG/1
10 TABLET ORAL
Qty: 90 TABLET | Refills: 1 | Status: SHIPPED | OUTPATIENT
Start: 2024-09-12

## 2024-09-13 LAB
BACTERIA SPEC CULT: NORMAL
BACTERIA SPEC CULT: NORMAL
C DIFF TOX A+B STL QL IA: NEGATIVE
CAMPYLOBACTER STL CULT: NORMAL
E COLI SXT STL QL IA: NEGATIVE
SALM + SHIG STL CULT: NORMAL

## 2024-09-23 ENCOUNTER — TELEPHONE (OUTPATIENT)
Dept: FAMILY MEDICINE CLINIC | Facility: CLINIC | Age: 64
End: 2024-09-23
Payer: COMMERCIAL

## 2024-12-17 ENCOUNTER — TELEPHONE (OUTPATIENT)
Dept: FAMILY MEDICINE CLINIC | Facility: CLINIC | Age: 64
End: 2024-12-17
Payer: COMMERCIAL

## 2024-12-17 NOTE — TELEPHONE ENCOUNTER
OKAY FOR HUB TO RELAY    I tried calling and speaking with the patient but did not get an answer. I left a brief message letting her know I was calling over a CT scan that had been ordered for her and for her to call back. If she calls back:    It looks like back in September a CT scan of your abdomen was ordered but it has not been done yet. We were wondering if you were still planning on having this done/ if you had already had it done or if you were okay with us canceling the orders. Please just let us know how you would like to proceed.

## 2025-01-25 DIAGNOSIS — I73.9 PAD (PERIPHERAL ARTERY DISEASE): ICD-10-CM

## 2025-01-25 DIAGNOSIS — J45.21 MILD INTERMITTENT ASTHMA WITH ACUTE EXACERBATION: ICD-10-CM

## 2025-01-27 RX ORDER — ALBUTEROL SULFATE 90 UG/1
2 INHALANT RESPIRATORY (INHALATION) EVERY 4 HOURS PRN
Qty: 6.7 G | Refills: 0 | Status: SHIPPED | OUTPATIENT
Start: 2025-01-27

## 2025-01-27 RX ORDER — CILOSTAZOL 50 MG/1
50 TABLET ORAL 2 TIMES DAILY
Qty: 60 TABLET | Refills: 5 | Status: SHIPPED | OUTPATIENT
Start: 2025-01-27

## 2025-01-27 NOTE — TELEPHONE ENCOUNTER
LOV 8/13/24  NOV None  LF 1/29/24    [facility]

## 2025-03-27 DIAGNOSIS — G89.29 CHRONIC MIDLINE LOW BACK PAIN WITHOUT SCIATICA: ICD-10-CM

## 2025-03-27 DIAGNOSIS — J45.21 MILD INTERMITTENT ASTHMA WITH ACUTE EXACERBATION: ICD-10-CM

## 2025-03-27 DIAGNOSIS — M54.50 CHRONIC MIDLINE LOW BACK PAIN WITHOUT SCIATICA: ICD-10-CM

## 2025-03-27 DIAGNOSIS — M51.369 DDD (DEGENERATIVE DISC DISEASE), LUMBAR: ICD-10-CM

## 2025-03-27 RX ORDER — ALBUTEROL SULFATE 90 UG/1
2 INHALANT RESPIRATORY (INHALATION) EVERY 4 HOURS PRN
Qty: 6.7 G | Refills: 0 | Status: SHIPPED | OUTPATIENT
Start: 2025-03-27

## 2025-03-27 RX ORDER — ORPHENADRINE CITRATE 100 MG/1
TABLET ORAL
Qty: 60 TABLET | Refills: 2 | Status: SHIPPED | OUTPATIENT
Start: 2025-03-27

## 2025-03-27 NOTE — TELEPHONE ENCOUNTER
LOV                   8/13/2024  NOV                   (around 9/13/2024   Last refill             8/13/24 1/27/25  Protocol              met

## 2025-04-03 DIAGNOSIS — I25.10 CORONARY ARTERY DISEASE INVOLVING NATIVE CORONARY ARTERY OF NATIVE HEART WITHOUT ANGINA PECTORIS: ICD-10-CM

## 2025-04-03 NOTE — TELEPHONE ENCOUNTER
Caller: Shaye Flaquita E    Relationship: Self    Best call back number: [phone]    Requested Prescriptions:   Requested Prescriptions     Pending Prescriptions Disp Refills    clopidogrel (PLAVIX) 75 MG tablet 90 tablet 0     Sig: Take 1 tablet by mouth Daily.        Pharmacy where request should be sent: [facility] [id number] - [address] - [phone]  - [phone] FX     Last office visit with prescribing clinician: Visit date not found   Last telemedicine visit with prescribing clinician: Visit date not found   Next office visit with prescribing clinician: Visit date not found     Additional details provided by patient: PATIENT HAS BEEN OUT FOR THREE DAYS    Does the patient have less than a 3 day supply:  [x] Yes  [] No    Would you like a call back once the refill request has been completed: [] Yes [x] No    If the office needs to give you a call back, can they leave a voicemail: [] Yes [x] No    Onelia Givens Rep   04/03/25 08:29 EDT            Detail Level: Detailed Quality 226: Preventive Care And Screening: Tobacco Use: Screening And Cessation Intervention: Patient screened for tobacco use and is an ex/non-smoker Quality 431: Preventive Care And Screening: Unhealthy Alcohol Use - Screening: Patient not identified as an unhealthy alcohol user when screened for unhealthy alcohol use using a systematic screening method Quality 110: Preventive Care And Screening: Influenza Immunization: Influenza Immunization previously received during influenza season

## 2025-04-03 NOTE — TELEPHONE ENCOUNTER
.LOV- 8/13/24  .NOV- 4/28/25  .LF- 9/12/24    Called pt and made her appt.  She is completely out of medication

## 2025-04-07 RX ORDER — CLOPIDOGREL BISULFATE 75 MG/1
75 TABLET ORAL DAILY
Qty: 90 TABLET | Refills: 0 | Status: SHIPPED | OUTPATIENT
Start: 2025-04-07

## 2025-04-28 ENCOUNTER — OFFICE VISIT (OUTPATIENT)
Dept: FAMILY MEDICINE CLINIC | Facility: CLINIC | Age: 65
End: 2025-04-28
Payer: COMMERCIAL

## 2025-04-28 VITALS
TEMPERATURE: 98.2 F | SYSTOLIC BLOOD PRESSURE: 138 MMHG | HEIGHT: 64 IN | BODY MASS INDEX: 31.31 KG/M2 | OXYGEN SATURATION: 88 % | WEIGHT: 183.4 LBS | DIASTOLIC BLOOD PRESSURE: 72 MMHG | HEART RATE: 87 BPM

## 2025-04-28 DIAGNOSIS — I10 ESSENTIAL HYPERTENSION: ICD-10-CM

## 2025-04-28 DIAGNOSIS — R73.03 PREDIABETES: ICD-10-CM

## 2025-04-28 DIAGNOSIS — E55.9 VITAMIN D DEFICIENCY: ICD-10-CM

## 2025-04-28 DIAGNOSIS — Z12.31 SCREENING MAMMOGRAM FOR BREAST CANCER: ICD-10-CM

## 2025-04-28 DIAGNOSIS — Z28.21 TETANUS, DIPHTHERIA, AND ACELLULAR PERTUSSIS (TDAP) VACCINATION DECLINED: ICD-10-CM

## 2025-04-28 DIAGNOSIS — Z28.21 PNEUMOCOCCAL VACCINATION DECLINED BY PATIENT: ICD-10-CM

## 2025-04-28 DIAGNOSIS — Z00.00 ANNUAL PHYSICAL EXAM: Primary | ICD-10-CM

## 2025-04-28 DIAGNOSIS — I65.23 INTERNAL CAROTID ARTERY STENOSIS, BILATERAL: ICD-10-CM

## 2025-04-28 DIAGNOSIS — Z28.21 COVID-19 VACCINATION DECLINED: ICD-10-CM

## 2025-04-28 DIAGNOSIS — Z53.20 STATIN MEDICATION DECLINED BY PATIENT: ICD-10-CM

## 2025-04-28 DIAGNOSIS — F17.200 CURRENT EVERY DAY SMOKER: ICD-10-CM

## 2025-04-28 DIAGNOSIS — G60.9 IDIOPATHIC NEUROPATHY: ICD-10-CM

## 2025-04-28 DIAGNOSIS — Z28.21 HERPES ZOSTER VACCINATION DECLINED: ICD-10-CM

## 2025-04-28 DIAGNOSIS — Z12.2 ENCOUNTER FOR SCREENING FOR LUNG CANCER: ICD-10-CM

## 2025-04-28 DIAGNOSIS — E78.2 MIXED HYPERLIPIDEMIA: ICD-10-CM

## 2025-04-28 DIAGNOSIS — I73.9 PAD (PERIPHERAL ARTERY DISEASE): ICD-10-CM

## 2025-04-28 DIAGNOSIS — I25.10 CORONARY ARTERY DISEASE INVOLVING NATIVE CORONARY ARTERY OF NATIVE HEART WITHOUT ANGINA PECTORIS: ICD-10-CM

## 2025-04-28 DIAGNOSIS — Z12.11 COLON CANCER SCREENING: ICD-10-CM

## 2025-04-28 DIAGNOSIS — F17.210 CIGARETTE NICOTINE DEPENDENCE WITHOUT COMPLICATION: ICD-10-CM

## 2025-04-28 RX ORDER — IBUPROFEN 200 MG
800 TABLET ORAL 2 TIMES DAILY PRN
COMMUNITY

## 2025-04-28 NOTE — PROGRESS NOTES
Chief Complaint   Patient presents with    Annual Exam       HPI:  Flaquita Cr, -1960, is a 65 y.o. female who presents for an annual physical.  65-year-old female with history of hypertension, COPD with asthma and obstructive sleep apnea is a current daily smoker, PAD with carotid artery stenosis, GERD, hyperlipidemia, vitamin D deficiency, osteopenia, coronary artery disease status post angioplasty with stent in the RCA, IBS, overactive bladder, degenerative disc disease with lumbar radiculopathy, and idiopathic neuropathy.  Patient refuses colonoscopy but agrees to cologaurd with family history of sister with complications after colonoscopy.    Recent Hospitalizations:  No hospitalization(s) within the last year..    Current Medical Providers:  Patient Care Team:  Ren Garza MD as PCP - General (Internal Medicine)  Hugo Mims MD as Consulting Physician (Gastroenterology)  David Higgins MD (Inactive) as Consulting Physician (Cardiology)  Ian Saldivar MD as Surgeon (Neurosurgery)  Jovani Ahuja MD as Consulting Physician (Vascular Surgery)  Grabiel Jefferson MD as Consulting Physician (Otolaryngology)  Ren Childress OD (Optometry)    Compared to one year ago, the patient feels her physical health is the same and her mental health is the same.    Depression Screen:      2025     2:04 PM   PHQ-2/PHQ-9 Depression Screening   Little interest or pleasure in doing things Not at all   Feeling down, depressed, or hopeless Not at all       Past Medical/Family/Social History:  The following portions of the patient's history were reviewed and updated as appropriate: allergies, current medications, past family history, past medical history, past social history, past surgical history, and problem list.    Allergies   Allergen Reactions    Chantix [Varenicline] Other (See Comments)     Severe fatigue and somnolence    Duloxetine Other (See Comments)     somnelence     Gabapentin Confusion    Sulfamethoxazole Unknown - Low Severity    Tramadol Unknown - Low Severity    Doxycycline GI Intolerance    Lortab [Hydrocodone-Acetaminophen] Itching    Macrobid [Nitrofurantoin Macrocrystal] Myalgia    Pantoprazole GI Intolerance         Current Outpatient Medications:     Acetaminophen (TYLENOL ARTHRITIS PAIN PO), Take 650 mg by mouth 2 (Two) Times a Day As Needed., Disp: , Rfl:     albuterol sulfate  (90 Base) MCG/ACT inhaler, INHALE 2 PUFFS BY MOUTH EVERY 4 HOURS AS NEEDED FOR WHEEZING OR SHORTNESS OF AIR., Disp: 6.7 g, Rfl: 0    clopidogrel (PLAVIX) 75 MG tablet, Take 1 tablet by mouth Daily., Disp: 90 tablet, Rfl: 0    diphenhydrAMINE (BENADRYL) 25 mg capsule, Take 1 capsule by mouth Every 6 (Six) Hours As Needed for Itching., Disp: , Rfl:     Fluticasone-Umeclidin-Vilant (TRELEGY) 100-62.5-25 MCG/INH inhaler, Inhale 1 puff., Disp: , Rfl:     guaiFENesin (Mucinex) 600 MG 12 hr tablet, Take 2 tablets by mouth 2 (Two) Times a Day., Disp: 60 tablet, Rfl: 5    ibuprofen (ADVIL,MOTRIN) 200 MG tablet, Take 4 tablets by mouth 2 (Two) Times a Day As Needed for Mild Pain., Disp: , Rfl:     levocetirizine (XYZAL) 5 MG tablet, Take 1 tablet by mouth Every Evening., Disp: , Rfl:     montelukast (SINGULAIR) 10 MG tablet, TAKE ONE TABLET BY MOUTH EVERY NIGHT AT BEDTIME, Disp: 90 tablet, Rfl: 1    orphenadrine (NORFLEX) 100 MG 12 hr tablet, TAKE 1 TABLET BY MOUTH 2 TIMES A DAY AS NEEDED FOR PAIN, Disp: 60 tablet, Rfl: 2    vitamin D3 125 MCG (5000 UT) capsule capsule, Take 1 capsule by mouth Daily., Disp: , Rfl:     Xiidra 5 % ophthalmic solution, Administer 1 drop to both eyes 2 (Two) Times a Day. (Patient not taking: Reported on 4/28/2025), Disp: , Rfl:     Current medication list contains no high risk medications.  No harmful drug interactions have been identified.     Family History   Problem Relation Age of Onset    Osteoporosis Mother     Atrial fibrillation Mother         pacemaker     Arthritis Mother     Alzheimer's disease Mother     Stroke Mother     Macular degeneration Father     Cancer Father     Arthritis Sister     Clotting disorder Sister     Gout Brother     Arthritis Brother     Colon cancer Maternal Aunt     Uterine cancer Maternal Aunt     Cancer Other     Other Other         blood clots, bleeding tendency    Cancer Other     Breast cancer Neg Hx        Social History     Tobacco Use    Smoking status: Every Day     Types: Cigarettes     Start date: 1970     Passive exposure: Past    Smokeless tobacco: Never    Tobacco comments:     Began smoking at age 10.  Smoked 1 ppd for 15 years, 2 ppd for 18 years, and 2.5 ppd for 15 years for an 88.5 pack year history. 8/13/24  No longer using e-cig, smoking at least half pack cigarettes daily    Substance Use Topics    Alcohol use: Yes     Comment: once per year, holidays/ CAFFEINE USE: 2 CUPS COFFEE DAILY       Past Surgical History:   Procedure Laterality Date    AORTA FEMORAL BYPASS  10/25/2011    CARDIAC CATHETERIZATION N/A 06/26/2020    Procedure: Stent OSIRIS coronary;  Surgeon: David Higgins MD;  Location:  [facility];  Service: Cardiology;  Laterality: N/A;    CARDIAC CATHETERIZATION N/A 06/26/2020    Procedure: Coronary angiography;  Surgeon: David Higgins MD;  Location:  [facility];  Service: Cardiology;  Laterality: N/A;    CARDIAC CATHETERIZATION N/A 06/26/2020    Procedure: Aortic root aortogram;  Surgeon: David Higgins MD;  Location:  [facility];  Service: Cardiology;  Laterality: N/A;    CARDIAC CATHETERIZATION N/A 06/26/2020    Procedure: Percutaneous Coronary Intervention;  Surgeon: David Higgins MD;  Location:  [facility];  Service: Cardiology;  Laterality: N/A;    COLONOSCOPY N/A 08/24/2017    NBIH, diverticulosis, four 5 to 6 mm polyps (one tubular adenoma)    EPIDURAL BLOCK      HYSTERECTOMY  1988    endometriosis    LEG  SURGERY Bilateral     for PAD    MOUTH SURGERY      OTHER SURGICAL HISTORY  09/2011    AIF poss PTA/stent    OTHER SURGICAL HISTORY      CT angiofram of the head and neck    ROOT CANAL      R upper jaw with rootcanal       Patient Active Problem List   Diagnosis    Arthritis of hand    Reactive airway disease with acute exacerbation    Acute bronchitis    Secondary hypertension    Chronic back pain    Acne rosacea    Chronic fatigue    Overactive bladder    Diverticulosis of large intestine without hemorrhage    COPD (chronic obstructive pulmonary disease)    Asthma    IBS (irritable bowel syndrome)    Depression    Current every day smoker    TAI (obstructive sleep apnea)    PAD (peripheral artery disease)    GERD (gastroesophageal reflux disease)    AR (allergic rhinitis)    Hyperlipidemia    Vitamin D deficiency    Osteopenia    Cervical stenosis of spinal canal    DDD (degenerative disc disease), lumbar    Lumbar radiculopathy    Bilateral hand numbness    Prediabetes    Elevated serum creatinine    Chest pain    CAD (coronary artery disease)    Status post insertion of drug-eluting stent into right coronary artery for coronary artery disease    Status post primary angioplasty with coronary stent    Facet syndrome, lumbar    Essential hypertension    Neuropathy of both feet    Lower abdominal tenderness    Acute midline low back pain without sciatica    Bilateral knee swelling    Skin lesion of scalp    Internal carotid artery stenosis, bilateral    Age-related cataract of both eyes    Idiopathic neuropathy    Annual physical exam    Cigarette nicotine dependence without complication    Tetanus, diphtheria, and acellular pertussis (Tdap) vaccination declined    Pneumococcal vaccination declined by patient    Herpes zoster vaccination declined    COVID-19 vaccination declined    Statin medication declined by patient       Review of Systems   Constitutional:  Negative for activity change, appetite change, fatigue,  "fever, unexpected weight gain and unexpected weight loss.   HENT:  Negative for nosebleeds, rhinorrhea, trouble swallowing and voice change.    Eyes:  Negative for visual disturbance.   Respiratory:  Positive for shortness of breath. Negative for cough, chest tightness and wheezing.    Cardiovascular:  Negative for chest pain, palpitations and leg swelling.   Gastrointestinal:  Negative for abdominal pain, blood in stool, constipation, diarrhea, nausea, vomiting, GERD and indigestion.   Genitourinary:  Negative for dysuria, frequency and hematuria.   Musculoskeletal:  Negative for arthralgias, back pain and myalgias.   Skin:  Negative for rash and wound.   Neurological:  Negative for dizziness, tremors, weakness, light-headedness, numbness, headache and memory problem.   Hematological:  Negative for adenopathy. Does not bruise/bleed easily.   Psychiatric/Behavioral:  Negative for sleep disturbance and depressed mood. The patient is not nervous/anxious.        Objective     Vitals:    04/28/25 1405 04/28/25 1501   BP: 150/90 138/72   BP Location: Left arm Right arm   Patient Position: Sitting Sitting   Cuff Size: Large Adult Adult   Pulse: 87    Temp: 98.2 °F (36.8 °C)    TempSrc: Temporal    SpO2: (!) 88%    Weight: 83.2 kg (183 lb 6.4 oz)    Height: 162.6 cm (64.02\")        BMI is >= 30 and <35. (Class 1 Obesity). The following options were offered after discussion;: exercise counseling/recommendations and nutrition counseling/recommendations    Physical Exam  Vitals and nursing note reviewed.   Constitutional:       General: She is not in acute distress.     Appearance: She is well-developed. She is not diaphoretic.   HENT:      Head: Normocephalic and atraumatic.      Right Ear: External ear normal.      Left Ear: External ear normal.      Nose: Nose normal.   Eyes:      Conjunctiva/sclera: Conjunctivae normal.      Pupils: Pupils are equal, round, and reactive to light.   Neck:      Thyroid: No thyromegaly.      " Trachea: No tracheal deviation.   Cardiovascular:      Rate and Rhythm: Normal rate and regular rhythm.      Heart sounds: Normal heart sounds. No murmur heard.     No friction rub. No gallop.   Pulmonary:      Effort: Pulmonary effort is normal. No respiratory distress.      Breath sounds: Normal breath sounds.   Abdominal:      General: Bowel sounds are normal.      Palpations: Abdomen is soft. There is no mass.      Tenderness: There is no abdominal tenderness. There is no guarding.   Musculoskeletal:         General: Normal range of motion.      Cervical back: Normal range of motion and neck supple.   Lymphadenopathy:      Cervical: No cervical adenopathy.   Skin:     General: Skin is warm and dry.      Capillary Refill: Capillary refill takes less than 2 seconds.      Findings: No rash.   Neurological:      Mental Status: She is alert and oriented to person, place, and time.      Motor: No abnormal muscle tone.      Deep Tendon Reflexes: Reflexes normal.   Psychiatric:         Behavior: Behavior normal.         Thought Content: Thought content normal.         Judgment: Judgment normal.     Recent Lab Results:  Lab Results   Component Value Date    Glucose 91 09/05/2024    Glucose 88 06/27/2020    Glucose, UA Negative 09/26/2023     Lab Results   Component Value Date    Total Cholesterol 329 (H) 09/21/2023    Triglycerides 172 (H) 09/21/2023    HDL Cholesterol 56 09/21/2023    VLDL Cholesterol 55.2 08/20/2020    VLDL Cholesterol Daron 33 09/21/2023       Assessment & Plan   Age-appropriate Screening Schedule:  Refer to the list below for future screening recommendations based on patient's age, sex and/or medical conditions.      Health Maintenance   Topic Date Due    DXA SCAN  04/08/2011    COLORECTAL CANCER SCREENING  08/24/2020    LIPID PANEL  09/21/2024    MAMMOGRAM  11/08/2024    TDAP/TD VACCINES (1 - Tdap) 10/25/2025 (Originally 3/23/1979)    COVID-19 Vaccine (1 - 2024-25 season) 10/28/2025 (Originally  9/1/2024)    Pneumococcal Vaccine 50+ (2 of 2 - PCV) 04/25/2026 (Originally 5/30/2019)    ZOSTER VACCINE (1 of 2) 04/28/2026 (Originally 3/23/2010)    INFLUENZA VACCINE  07/01/2025    ANNUAL PHYSICAL  04/28/2026    HEPATITIS C SCREENING  Addressed       Diagnoses and all orders for this visit:    1. Annual physical exam (Primary)    2. Essential hypertension  -     Comprehensive Metabolic Panel  -     Lipid Panel  -     CBC & Differential    3. Mixed hyperlipidemia  -     Comprehensive Metabolic Panel  -     Lipid Panel    4. Coronary artery disease involving native coronary artery of native heart without angina pectoris  -     Comprehensive Metabolic Panel  -     Lipid Panel  -     CBC & Differential    5. PAD (peripheral artery disease)  -     Comprehensive Metabolic Panel  -     Lipid Panel    6. Internal carotid artery stenosis, bilateral    7. Vitamin D deficiency  -     Vitamin D,25-Hydroxy    8. Prediabetes  -     Hemoglobin A1c    9. Idiopathic neuropathy    10. Current every day smoker  -     CT Chest Low Dose Wo; Future    11. Cigarette nicotine dependence without complication  -     CT Chest Low Dose Wo; Future    12. Screening mammogram for breast cancer  -     Mammo Screening Digital Tomosynthesis Bilateral With CAD; Future    13. Colon cancer screening  -     Cologuard - Stool, Per Rectum; Future    14. Encounter for screening for lung cancer  -     CT Chest Low Dose Wo; Future    15. Tetanus, diphtheria, and acellular pertussis (Tdap) vaccination declined    16. Pneumococcal vaccination declined by patient    17. Herpes zoster vaccination declined    18. COVID-19 vaccination declined    19. Statin medication declined by patient        Annual wellness visit reviewed with patient.  All past history, medications, social history, and problem list were reviewed.  Discussed advanced directives and living will.  Patient has living will: Living will: no and information packet given to patient to complete at  home and bring copy to office.  Will check the labs as ordered above to evaluate the blood sugars, kidney, liver, cholesterol for screening.  Discussed flu shot recommended to get the influenza vaccine annually in the fall.  Tdap, COVID series, Prevnar 20, and Shingrix and pneumonia vaccination series discussed however patient declines all even despite physician and 's concerns and expression of recommendation..  Encouraged follow-up with the eye doctor on annual basis.  Discussed weight and encouraged exercise as tolerated while following a healthy diet.  Colon cancer screening discussed and current status: colonoscopy last completed on 8/24/2017 By Dr. Mims demonstrating nonbleeding internal hemorrhoids and diverticula with 4 sessile polyps in the rectum removed.  No other show tubular adenoma with low-grade dysplasia and benign hyperplastic polyps.  Patient refuses colonoscopy but agrees to cologaurd with family history of sister with complications after colonoscopy.  Discussed female health and screening including Pap smear/ pelvic exam/ self breast exam/ mammogram.  Patient is reluctant for mammogram but after discussion is agreeable.  Follow up with current specialists as needed.   Discussed the use of the statin with her known history of peripheral artery disease and heart disease but she declines no matter what is spoken.    An After Visit Summary with all of these plans were given to the patient.        Follow Up:  No follow-ups on file.

## 2025-04-29 LAB
25(OH)D3+25(OH)D2 SERPL-MCNC: 43.1 NG/ML (ref 30–100)
ALBUMIN SERPL-MCNC: 4.4 G/DL (ref 3.9–4.9)
ALP SERPL-CCNC: 96 IU/L (ref 44–121)
ALT SERPL-CCNC: 16 IU/L (ref 0–32)
AST SERPL-CCNC: 23 IU/L (ref 0–40)
BASOPHILS # BLD AUTO: 0 X10E3/UL (ref 0–0.2)
BASOPHILS NFR BLD AUTO: 0 %
BILIRUB SERPL-MCNC: <0.2 MG/DL (ref 0–1.2)
BUN SERPL-MCNC: 15 MG/DL (ref 8–27)
BUN/CREAT SERPL: 13 (ref 12–28)
CALCIUM SERPL-MCNC: 10.1 MG/DL (ref 8.7–10.3)
CHLORIDE SERPL-SCNC: 103 MMOL/L (ref 96–106)
CHOLEST SERPL-MCNC: 340 MG/DL (ref 100–199)
CO2 SERPL-SCNC: 22 MMOL/L (ref 20–29)
CREAT SERPL-MCNC: 1.19 MG/DL (ref 0.57–1)
EGFRCR SERPLBLD CKD-EPI 2021: 51 ML/MIN/1.73
EOSINOPHIL # BLD AUTO: 0.1 X10E3/UL (ref 0–0.4)
EOSINOPHIL NFR BLD AUTO: 2 %
ERYTHROCYTE [DISTWIDTH] IN BLOOD BY AUTOMATED COUNT: 15.2 % (ref 11.7–15.4)
GLOBULIN SER CALC-MCNC: 2.9 G/DL (ref 1.5–4.5)
GLUCOSE SERPL-MCNC: 91 MG/DL (ref 70–99)
HBA1C MFR BLD: 6.1 % (ref 4.8–5.6)
HCT VFR BLD AUTO: 42.9 % (ref 34–46.6)
HDLC SERPL-MCNC: 60 MG/DL
HGB BLD-MCNC: 14.3 G/DL (ref 11.1–15.9)
IMM GRANULOCYTES # BLD AUTO: 0 X10E3/UL (ref 0–0.1)
IMM GRANULOCYTES NFR BLD AUTO: 0 %
LDL CALC COMMENT:: ABNORMAL
LDLC SERPL CALC-MCNC: 249 MG/DL (ref 0–99)
LYMPHOCYTES # BLD AUTO: 2.6 X10E3/UL (ref 0.7–3.1)
LYMPHOCYTES NFR BLD AUTO: 28 %
MCH RBC QN AUTO: 30.8 PG (ref 26.6–33)
MCHC RBC AUTO-ENTMCNC: 33.3 G/DL (ref 31.5–35.7)
MCV RBC AUTO: 92 FL (ref 79–97)
MONOCYTES # BLD AUTO: 0.7 X10E3/UL (ref 0.1–0.9)
MONOCYTES NFR BLD AUTO: 8 %
NEUTROPHILS # BLD AUTO: 5.8 X10E3/UL (ref 1.4–7)
NEUTROPHILS NFR BLD AUTO: 62 %
PLATELET # BLD AUTO: 373 X10E3/UL (ref 150–450)
POTASSIUM SERPL-SCNC: 4.8 MMOL/L (ref 3.5–5.2)
PROT SERPL-MCNC: 7.3 G/DL (ref 6–8.5)
RBC # BLD AUTO: 4.65 X10E6/UL (ref 3.77–5.28)
SODIUM SERPL-SCNC: 142 MMOL/L (ref 134–144)
TRIGL SERPL-MCNC: 162 MG/DL (ref 0–149)
VLDLC SERPL CALC-MCNC: 31 MG/DL (ref 5–40)
WBC # BLD AUTO: 9.3 X10E3/UL (ref 3.4–10.8)

## 2025-07-03 DIAGNOSIS — I25.10 CORONARY ARTERY DISEASE INVOLVING NATIVE CORONARY ARTERY OF NATIVE HEART WITHOUT ANGINA PECTORIS: ICD-10-CM

## 2025-07-03 RX ORDER — CLOPIDOGREL BISULFATE 75 MG/1
75 TABLET ORAL DAILY
Qty: 90 TABLET | Refills: 0 | Status: SHIPPED | OUTPATIENT
Start: 2025-07-03

## 2025-07-03 NOTE — TELEPHONE ENCOUNTER
LOV                   4/28/2025  NOV         no fu         Last refill        4/7/25       Protocol         met

## (undated) DEVICE — NC TREK™ CORONARY DILATATION CATHETER 2.25 MM X 12 MM / RAPID-EXCHANGE: Brand: NC TREK™

## (undated) DEVICE — TUBING, SUCTION, 1/4" X 10', STRAIGHT: Brand: MEDLINE

## (undated) DEVICE — PK CATH CARD 40

## (undated) DEVICE — CATH DIAG IMPULSE FR4 5F 100CM

## (undated) DEVICE — Device: Brand: DEFENDO AIR/WATER/SUCTION AND BIOPSY VALVE

## (undated) DEVICE — THE TORRENT IRRIGATION SCOPE CONNECTOR IS USED WITH THE TORRENT IRRIGATION TUBING TO PROVIDE IRRIGATION FLUIDS SUCH AS STERILE WATER DURING GASTROINTESTINAL ENDOSCOPIC PROCEDURES WHEN USED IN CONJUNCTION WITH AN IRRIGATION PUMP (OR ELECTROSURGICAL UNIT).: Brand: TORRENT

## (undated) DEVICE — 5F .058 JR 4 100CM: Brand: ADROIT

## (undated) DEVICE — HI-TORQUE EXTRA S'PORT GUIDE WIRE .014 STRAIGHT TIP 3.0 CM X 190 CM: Brand: HI-TORQUE EXTRA S'PORT

## (undated) DEVICE — GC 5F 056 XB 3.5 LBT: Brand: BRITE TIP

## (undated) DEVICE — SINGLE-USE BIOPSY FORCEPS: Brand: RADIAL JAW 4

## (undated) DEVICE — TREK CORONARY DILATATION CATHETER 2.25 MM X 12 MM / RAPID-EXCHANGE: Brand: TREK

## (undated) DEVICE — CANN NASL CO2 TRULINK W/O2 A/

## (undated) DEVICE — CATH GUIDE CONVEY FL3.5 .058IN 5FR

## (undated) DEVICE — GLIDESHEATH SLENDER STAINLESS STEEL KIT: Brand: GLIDESHEATH SLENDER

## (undated) DEVICE — TR BAND RADIAL ARTERY COMPRESSION DEVICE: Brand: TR BAND

## (undated) DEVICE — DEV INDEFLATOR

## (undated) DEVICE — CATH VENT MIV RADL PIG ST TIP 5F 110CM

## (undated) DEVICE — GC 5F 056 JL 3.5 LBT: Brand: BRITE TIP

## (undated) DEVICE — CATH DIAG IMPULSE PIG 5F 100CM

## (undated) DEVICE — TBG 02 CRUSH RESIST LF CLR 7FT

## (undated) DEVICE — CATH DIAG IMPULSE FL3.5 5F 100CM

## (undated) DEVICE — GW INQWIRE FC PTFE J/3MM .035 180

## (undated) DEVICE — TREK CORONARY DILATATION CATHETER 2.25 MM X 15 MM / RAPID-EXCHANGE: Brand: TREK